# Patient Record
Sex: FEMALE | Race: WHITE | NOT HISPANIC OR LATINO | Employment: FULL TIME | URBAN - METROPOLITAN AREA
[De-identification: names, ages, dates, MRNs, and addresses within clinical notes are randomized per-mention and may not be internally consistent; named-entity substitution may affect disease eponyms.]

---

## 2017-11-09 ENCOUNTER — OFFICE VISIT (OUTPATIENT)
Dept: URGENT CARE | Facility: CLINIC | Age: 62
End: 2017-11-09
Payer: COMMERCIAL

## 2017-11-09 VITALS
OXYGEN SATURATION: 96 % | HEIGHT: 68 IN | DIASTOLIC BLOOD PRESSURE: 79 MMHG | WEIGHT: 186 LBS | BODY MASS INDEX: 28.19 KG/M2 | SYSTOLIC BLOOD PRESSURE: 117 MMHG | TEMPERATURE: 98 F | HEART RATE: 83 BPM

## 2017-11-09 DIAGNOSIS — J01.00 ACUTE MAXILLARY SINUSITIS, RECURRENCE NOT SPECIFIED: Primary | ICD-10-CM

## 2017-11-09 PROCEDURE — 99214 OFFICE O/P EST MOD 30 MIN: CPT | Mod: 25,S$GLB,, | Performed by: FAMILY MEDICINE

## 2017-11-09 PROCEDURE — 96372 THER/PROPH/DIAG INJ SC/IM: CPT | Mod: S$GLB,,, | Performed by: FAMILY MEDICINE

## 2017-11-09 RX ORDER — AMOXICILLIN AND CLAVULANATE POTASSIUM 875; 125 MG/1; MG/1
1 TABLET, FILM COATED ORAL 2 TIMES DAILY
Qty: 20 TABLET | Refills: 0 | Status: SHIPPED | OUTPATIENT
Start: 2017-11-09 | End: 2017-11-19

## 2017-11-09 RX ORDER — FLUOXETINE 10 MG/1
40 TABLET ORAL DAILY
COMMUNITY
End: 2018-06-07

## 2017-11-09 RX ORDER — BETAMETHASONE SODIUM PHOSPHATE AND BETAMETHASONE ACETATE 3; 3 MG/ML; MG/ML
6 INJECTION, SUSPENSION INTRA-ARTICULAR; INTRALESIONAL; INTRAMUSCULAR; SOFT TISSUE
Status: COMPLETED | OUTPATIENT
Start: 2017-11-09 | End: 2017-11-09

## 2017-11-09 RX ADMIN — BETAMETHASONE SODIUM PHOSPHATE AND BETAMETHASONE ACETATE 6 MG: 3; 3 INJECTION, SUSPENSION INTRA-ARTICULAR; INTRALESIONAL; INTRAMUSCULAR; SOFT TISSUE at 03:11

## 2017-11-09 NOTE — PROGRESS NOTES
"Subjective:       Patient ID: Dasia Henning is a 62 y.o. female.    Vitals:  height is 5' 8" (1.727 m) and weight is 84.4 kg (186 lb). Her temperature is 98.1 °F (36.7 °C). Her blood pressure is 117/79 and her pulse is 83. Her oxygen saturation is 96%.     Chief Complaint: Sinus Problem    Patient with sinus congestion x 3 weeks. Patient reports swollen glands and overall fatigue.       Sinus Problem   This is a new problem. The current episode started 1 to 4 weeks ago. The problem is unchanged. There has been no fever. Associated symptoms include congestion, coughing, headaches, a hoarse voice and a sore throat. Pertinent negatives include no chills, ear pain or shortness of breath.     Review of Systems   Constitution: Positive for malaise/fatigue. Negative for chills and fever.   HENT: Positive for congestion, hoarse voice and sore throat. Negative for ear pain.    Eyes: Negative for discharge and redness.   Cardiovascular: Negative for chest pain, dyspnea on exertion and leg swelling.   Respiratory: Positive for cough and sputum production. Negative for shortness of breath and wheezing.    Musculoskeletal: Negative for myalgias.   Gastrointestinal: Negative for abdominal pain and nausea.   Neurological: Positive for headaches.       Objective:      Physical Exam   Constitutional: She appears well-developed and well-nourished.   HENT:   Head: Normocephalic and atraumatic.   Nose: Mucosal edema and rhinorrhea present. Right sinus exhibits maxillary sinus tenderness. Left sinus exhibits maxillary sinus tenderness.   Mouth/Throat: Posterior oropharyngeal erythema present. No oropharyngeal exudate.   Cardiovascular: Normal rate, regular rhythm and normal heart sounds.    Pulmonary/Chest: Effort normal.   Nursing note and vitals reviewed.      Assessment:       1. Acute maxillary sinusitis, recurrence not specified        Plan:         Acute maxillary sinusitis, recurrence not specified  -     amoxicillin-clavulanate " 875-125mg (AUGMENTIN) 875-125 mg per tablet; Take 1 tablet by mouth 2 (two) times daily.  Dispense: 20 tablet; Refill: 0  -     betamethasone acetate-betamethasone sodium phosphate injection 6 mg; Inject 1 mL (6 mg total) into the muscle one time.

## 2017-11-09 NOTE — PATIENT INSTRUCTIONS
Sinusitis (Antibiotic Treatment)    The sinuses are air-filled spaces within the bones of the face. They connect to the inside of the nose. Sinusitis is an inflammation of the tissue lining the sinus cavity. Sinus inflammation can occur during a cold. It can also be due to allergies to pollens and other particles in the air. Sinusitis can cause symptoms of sinus congestion and fullness. A sinus infection causes fever, headache and facial pain. There is often green or yellow drainage from the nose or into the back of the throat (post-nasal drip). You have been given antibiotics to treat this condition.  Home care:  · Take the full course of antibiotics as instructed. Do not stop taking them, even if you feel better.  · Drink plenty of water, hot tea, and other liquids. This may help thin mucus. It also may promote sinus drainage.  · Heat may help soothe painful areas of the face. Use a towel soaked in hot water. Or,  the shower and direct the hot spray onto your face. Using a vaporizer along with a menthol rub at night may also help.   · An expectorant containing guaifenesin may help thin the mucus and promote drainage from the sinuses.  · Over-the-counter decongestants may be used unless a similar medicine was prescribed. Nasal sprays work the fastest. Use one that contains phenylephrine or oxymetazoline. First blow the nose gently. Then use the spray. Do not use these medicines more often than directed on the label or symptoms may get worse. You may also use tablets containing pseudoephedrine. Avoid products that combine ingredients, because side effects may be increased. Read labels. You can also ask the pharmacist for help. (NOTE: Persons with high blood pressure should not use decongestants. They can raise blood pressure.)  · Over-the-counter antihistamines may help if allergies contributed to your sinusitis.    · Do not use nasal rinses or irrigation during an acute sinus infection, unless told to by  your health care provider. Rinsing may spread the infection to other sinuses.  · Use acetaminophen or ibuprofen to control pain, unless another pain medicine was prescribed. (If you have chronic liver or kidney disease or ever had a stomach ulcer, talk with your doctor before using these medicines. Aspirin should never be used in anyone under 18 years of age who is ill with a fever. It may cause severe liver damage.)  · Don't smoke. This can worsen symptoms.  Follow-up care  Follow up with your healthcare provider or our staff if you are not improving within the next week.  When to seek medical advice  Call your healthcare provider if any of these occur:  · Facial pain or headache becoming more severe  · Stiff neck  · Unusual drowsiness or confusion  · Swelling of the forehead or eyelids  · Vision problems, including blurred or double vision  · Fever of 100.4ºF (38ºC) or higher, or as directed by your healthcare provider  · Seizure  · Breathing problems  · Symptoms not resolving within 10 days  Date Last Reviewed: 4/13/2015  © 9765-7101 [employer]. [address] [place] [address], [place] [address]. All rights reserved. This information is not intended as a substitute for professional medical care. Always follow your healthcare professional's instructions.

## 2018-06-07 ENCOUNTER — OFFICE VISIT (OUTPATIENT)
Dept: ORTHOPEDICS | Facility: CLINIC | Age: 63
End: 2018-06-07
Payer: COMMERCIAL

## 2018-06-07 ENCOUNTER — HOSPITAL ENCOUNTER (OUTPATIENT)
Dept: RADIOLOGY | Facility: HOSPITAL | Age: 63
Discharge: HOME | End: 2018-06-07
Attending: ORTHOPAEDIC SURGERY
Payer: COMMERCIAL

## 2018-06-07 VITALS — HEIGHT: 68 IN | WEIGHT: 192 LBS | BODY MASS INDEX: 29.1 KG/M2

## 2018-06-07 DIAGNOSIS — M25.562 LEFT KNEE PAIN, UNSPECIFIED CHRONICITY: ICD-10-CM

## 2018-06-07 DIAGNOSIS — M25.562 LEFT KNEE PAIN, UNSPECIFIED CHRONICITY: Primary | ICD-10-CM

## 2018-06-07 DIAGNOSIS — M17.12 PRIMARY OSTEOARTHRITIS OF LEFT KNEE: ICD-10-CM

## 2018-06-07 PROCEDURE — 3008F BODY MASS INDEX DOCD: CPT | Mod: CPTII,S$GLB,, | Performed by: ORTHOPAEDIC SURGERY

## 2018-06-07 PROCEDURE — 73560 X-RAY EXAM OF KNEE 1 OR 2: CPT | Mod: TC,59,RT

## 2018-06-07 PROCEDURE — 99999 PR PBB SHADOW E&M-EST. PATIENT-LVL II: CPT | Mod: PBBFAC,,, | Performed by: ORTHOPAEDIC SURGERY

## 2018-06-07 PROCEDURE — 73562 X-RAY EXAM OF KNEE 3: CPT | Mod: 26,LT,, | Performed by: RADIOLOGY

## 2018-06-07 PROCEDURE — 99204 OFFICE O/P NEW MOD 45 MIN: CPT | Mod: S$GLB,,, | Performed by: ORTHOPAEDIC SURGERY

## 2018-06-07 PROCEDURE — 73560 X-RAY EXAM OF KNEE 1 OR 2: CPT | Mod: 26,XS,RT, | Performed by: RADIOLOGY

## 2018-06-07 RX ORDER — TRAZODONE HYDROCHLORIDE 50 MG/1
50 TABLET ORAL NIGHTLY
Refills: 1 | COMMUNITY
Start: 2018-05-22

## 2018-06-07 RX ORDER — FLUOXETINE HYDROCHLORIDE 40 MG/1
40 CAPSULE ORAL DAILY
Refills: 1 | COMMUNITY
Start: 2018-05-22 | End: 2020-02-02

## 2018-06-07 RX ORDER — DICLOFENAC SODIUM 75 MG/1
75 TABLET, DELAYED RELEASE ORAL 2 TIMES DAILY
Qty: 60 TABLET | Refills: 3 | Status: SHIPPED | OUTPATIENT
Start: 2018-06-07 | End: 2018-11-03 | Stop reason: SDUPTHER

## 2018-06-07 NOTE — PROGRESS NOTES
"HPI:    Dasia Henning is a 62 y.o. female who is here today for   Chief Complaint   Patient presents with    Left Knee - Pain, Swelling   .  Last worked up 2014.  MRI showed extensive patellofemoral arthritis and only mild weight-bearing arthritis.  The symptoms have progressed to the point where she has difficulty playing tennis and strenuous activity.    Duration: 6 months  Intensity: moderate  Character of pain: sharp  Location: She reports that the pain is predominately  Anterior patella pain worse.    Past Medical History:   Diagnosis Date    Anxiety     Depression     Fracture of lower leg           Current Outpatient Prescriptions:     alprazolam (XANAX) 0.5 MG tablet, , Disp: , Rfl:     FLUoxetine (PROZAC) 40 MG capsule, Take 40 mg by mouth once daily., Disp: , Rfl: 1    traZODone (DESYREL) 50 MG tablet, Take 50 mg by mouth nightly., Disp: , Rfl: 1    diclofenac (VOLTAREN) 75 MG EC tablet, Take 1 tablet (75 mg total) by mouth 2 (two) times daily., Disp: 60 tablet, Rfl: 3     Review of patient's allergies indicates:   Allergen Reactions    Morphine Nausea And Vomiting        ROS  Constitutional: Negative for fever, Positive for weight loss  HENT Negative for congestion  Cardiovascular: Negative chest pain  Respiratory: Negative Shortness of breath  Heme: Negative excessive bleeding  Skin:NegativeItching, Negative breakdown  Musculoskeletal:Negative for back pain, Positive for joint pain, Negative muscle pain, Negative muscle weakness  Neurological: Negative for numbness and paresthesias   Psychiatric/Behavioral: Negative altered mental status, Negative for depression    Physical Exam:   Ht 5' 8" (1.727 m)   Wt 87.1 kg (192 lb 0.3 oz)   BMI 29.20 kg/m²   General appearance: This is a well-developed, Well nourished female No obvious acute distress.  Psychiatric: normal mood and affect;  pleasant and conversant; judgment and thought content normal  Gait is coordinated. Patient has antalgic gait to the " left  Cardiovascular: There are no swelling or varicosities present.   Respiratory: normal respiratory effort   Lymphatic: no adenopathy   Neurologic: alert and oriented to person, place, and time   Deep Tendon Reflexes are normal;  Coordination and Balance: Normal   Musculoskeletal   Neck    ROM shows normal flexion and extension and lateral rotation    Palpation: Non-tender    Stability is normal    Strength is normal    Skin is normal without masses and lesions    Sensation is intact to light touch   Back    ROM showsnormal flexion, extension    and  rotation    Palpation shows no masses    Stability is normal    Strength to flexion and extension well maintained    Core strength is diminished    Skin shows no rashes or cafe au lait spots;     Sensation is intact to light touch    Right Knee  Swelling None  TendernessNone  Range of Motion: 130    Left Knee: Swelling Mild  TendernessPatella  Range of Motion: 120    Radiograph   Osteoarthritis: moderate  Angle: mild varus    Assessment:  Patellofemoral arthritis left knee.    Plan:  We will start nonsteroidal anti-inflammatory Voltaren.  Will modify her activities.

## 2018-11-03 DIAGNOSIS — M25.562 LEFT KNEE PAIN, UNSPECIFIED CHRONICITY: ICD-10-CM

## 2018-11-05 RX ORDER — DICLOFENAC SODIUM 75 MG/1
TABLET, DELAYED RELEASE ORAL
Qty: 60 TABLET | Refills: 3 | Status: SHIPPED | OUTPATIENT
Start: 2018-11-05 | End: 2019-12-06

## 2019-06-18 ENCOUNTER — TELEPHONE (OUTPATIENT)
Dept: ORTHOPEDICS | Facility: CLINIC | Age: 64
End: 2019-06-18

## 2019-06-18 DIAGNOSIS — M50.30 DDD (DEGENERATIVE DISC DISEASE), CERVICAL: Primary | ICD-10-CM

## 2019-11-26 ENCOUNTER — TELEPHONE (OUTPATIENT)
Dept: ORTHOPEDICS | Facility: CLINIC | Age: 64
End: 2019-11-26

## 2019-11-26 DIAGNOSIS — M51.36 DDD (DEGENERATIVE DISC DISEASE), LUMBAR: Primary | ICD-10-CM

## 2019-12-06 ENCOUNTER — OFFICE VISIT (OUTPATIENT)
Dept: ORTHOPEDICS | Facility: CLINIC | Age: 64
End: 2019-12-06
Payer: COMMERCIAL

## 2019-12-06 ENCOUNTER — HOSPITAL ENCOUNTER (OUTPATIENT)
Dept: RADIOLOGY | Facility: HOSPITAL | Age: 64
Discharge: HOME | End: 2019-12-06
Attending: PHYSICIAN ASSISTANT
Payer: COMMERCIAL

## 2019-12-06 VITALS — BODY MASS INDEX: 29.84 KG/M2 | WEIGHT: 196.88 LBS | HEIGHT: 68 IN

## 2019-12-06 DIAGNOSIS — M51.36 DDD (DEGENERATIVE DISC DISEASE), LUMBAR: ICD-10-CM

## 2019-12-06 DIAGNOSIS — M43.16 SPONDYLOLISTHESIS OF LUMBAR REGION: Primary | ICD-10-CM

## 2019-12-06 PROCEDURE — 99214 PR OFFICE/OUTPT VISIT, EST, LEVL IV, 30-39 MIN: ICD-10-PCS | Mod: S$GLB,,, | Performed by: PHYSICIAN ASSISTANT

## 2019-12-06 PROCEDURE — 72100 XR LUMBAR SPINE AP AND LAT WITH FLEX/EXT: ICD-10-PCS | Mod: 26,,, | Performed by: RADIOLOGY

## 2019-12-06 PROCEDURE — 99214 OFFICE O/P EST MOD 30 MIN: CPT | Mod: S$GLB,,, | Performed by: PHYSICIAN ASSISTANT

## 2019-12-06 PROCEDURE — 3008F BODY MASS INDEX DOCD: CPT | Mod: CPTII,S$GLB,, | Performed by: PHYSICIAN ASSISTANT

## 2019-12-06 PROCEDURE — 72100 X-RAY EXAM L-S SPINE 2/3 VWS: CPT | Mod: TC

## 2019-12-06 PROCEDURE — 72120 XR LUMBAR SPINE AP AND LAT WITH FLEX/EXT: ICD-10-PCS | Mod: 26,,, | Performed by: RADIOLOGY

## 2019-12-06 PROCEDURE — 72120 X-RAY BEND ONLY L-S SPINE: CPT | Mod: 26,,, | Performed by: RADIOLOGY

## 2019-12-06 PROCEDURE — 99999 PR PBB SHADOW E&M-EST. PATIENT-LVL II: ICD-10-PCS | Mod: PBBFAC,,, | Performed by: PHYSICIAN ASSISTANT

## 2019-12-06 PROCEDURE — 72100 X-RAY EXAM L-S SPINE 2/3 VWS: CPT | Mod: 26,,, | Performed by: RADIOLOGY

## 2019-12-06 PROCEDURE — 3008F PR BODY MASS INDEX (BMI) DOCUMENTED: ICD-10-PCS | Mod: CPTII,S$GLB,, | Performed by: PHYSICIAN ASSISTANT

## 2019-12-06 PROCEDURE — 99999 PR PBB SHADOW E&M-EST. PATIENT-LVL II: CPT | Mod: PBBFAC,,, | Performed by: PHYSICIAN ASSISTANT

## 2019-12-06 RX ORDER — MELOXICAM 15 MG/1
15 TABLET ORAL DAILY
Qty: 30 TABLET | Refills: 0 | Status: SHIPPED | OUTPATIENT
Start: 2019-12-06 | End: 2020-01-05

## 2019-12-06 NOTE — PROGRESS NOTES
DATE: 2019  PATIENT: Dasia Henning    Supervising Physician: Varun Prescott M.D.    CHIEF COMPLAINT: back pain    HISTORY:  Dasia Henning is a 64 y.o. female who works at [employer] here for initial evaluation of low back pain (Back - 7, Leg - 0). The pain has been present for since May. The patient describes the pain as sharp.  The pain is worse with standing, going up stairs and in the morning and improved by sitting and throughout the day. There is no associated numbness and tingling. There is no subjective weakness. Prior treatments have included ibuprofen and tylenol, but no physical therapy, ESIs or surgery.  She started therapy in the summer but could not continue due to schedule conflicts.     The patient denies myelopathic symptoms such as handwriting changes or difficulty with buttons/coins/keys. Denies perineal paresthesias, bowel/bladder dysfunction.    PAST MEDICAL/SURGICAL HISTORY:  Past Medical History:   Diagnosis Date    Anxiety     Depression     Fracture of lower leg      Past Surgical History:   Procedure Laterality Date    ANKLE FRACTURE SURGERY      APPENDECTOMY       SECTION      knee ascope      left    OVARIAN CYST REMOVAL         Medications:   Current Outpatient Medications on File Prior to Visit   Medication Sig Dispense Refill    alprazolam (XANAX) 0.5 MG tablet       FLUoxetine (PROZAC) 40 MG capsule Take 40 mg by mouth once daily.  1    traZODone (DESYREL) 50 MG tablet Take 50 mg by mouth nightly.  1    [DISCONTINUED] diclofenac (VOLTAREN) 75 MG EC tablet TAKE 1 TABLET BY MOUTH TWICE A DAY (Patient not taking: Reported on 2019) 60 tablet 3     No current facility-administered medications on file prior to visit.        Social History:   Social History     Socioeconomic History    Marital status:      Spouse name: Not on file    Number of children: Not on file    Years of education: Not on file    Highest education level: Not on file   Occupational  "History    Not on file   Social Needs    Financial resource strain: Not on file    Food insecurity:     Worry: Not on file     Inability: Not on file    Transportation needs:     Medical: Not on file     Non-medical: Not on file   Tobacco Use    Smoking status: Former Smoker     Types: Cigarettes    Smokeless tobacco: Never Used   Substance and Sexual Activity    Alcohol use: Yes     Alcohol/week: 5.8 standard drinks     Types: 7 Standard drinks or equivalent per week    Drug use: No    Sexual activity: Not on file   Lifestyle    Physical activity:     Days per week: Not on file     Minutes per session: Not on file    Stress: Not on file   Relationships    Social connections:     Talks on phone: Not on file     Gets together: Not on file     Attends Church service: Not on file     Active member of club or organization: Not on file     Attends meetings of clubs or organizations: Not on file     Relationship status: Not on file   Other Topics Concern    Not on file   Social History Narrative    Not on file       REVIEW OF SYSTEMS:  Constitution: Negative. Negative for chills, fever and night sweats.   Cardiovascular: Negative for chest pain and syncope.   Respiratory: Negative for cough and shortness of breath.   Gastrointestinal: See HPI. Negative for nausea/vomiting. Negative for abdominal pain.  Genitourinary: See HPI. Negative for discoloration or dysuria.  Skin: Negative for dry skin, itching and rash.   Hematologic/Lymphatic: Negative for bleeding problem. Does not bruise/bleed easily.   Musculoskeletal: Negative for falls and muscle weakness.   Neurological: See HPI. No seizures.   Endocrine: Negative for polydipsia, polyphagia and polyuria.   Allergic/Immunologic: Negative for hives and persistent infections.     EXAM:  Ht 5' 8" (1.727 m)   Wt 89.3 kg (196 lb 13.9 oz)   BMI 29.93 kg/m²     General: The patient is a very pleasant 64 y.o. female in no apparent distress, the patient is oriented " to person, place and time.  Psych: Normal mood and affect  HEENT: Vision grossly intact, hearing intact to the spoken word.  Lungs: Respirations unlabored.  Gait: Normal station and gait, no difficulty with toe or heel walk.   Skin: Dorsal lumbar skin negative for rashes, lesions, hairy patches and surgical scars. There is mild lumbar tenderness to palpation.  Range of motion: Lumbar range of motion is acceptable.  Spinal Balance: Global saggital and coronal spinal balance acceptable, not significant for scoliosis and kyphosis.  Musculoskeletal: No pain with the range of motion of the bilateral hips. No trochanteric tenderness to palpation.  Vascular: Bilateral lower extremities warm and well perfused, dorsalis pedis pulses 2+ bilaterally.  Neurological: Normal strength and tone in all major motor groups in the bilateral lower extremities. Normal sensation to light touch in the L2-S1 dermatomes bilaterally.  Deep tendon reflexes symmetric 2+ in the bilateral lower extremities.  Negative Babinski bilaterally. Straight leg raise negative bilaterally.    IMAGING:      Today I personally reviewed AP, Lat and Flex/Ex  upright L-spine films that demonstrate grade I anterolisthesis at L4/5. There is moderate L2/3 disc degeneration.         Body mass index is 29.93 kg/m².    No results found for: HGBA1C        ASSESSMENT/PLAN:    Dasia was seen today for low-back pain.    Diagnoses and all orders for this visit:    Spondylolisthesis of lumbar region    DDD (degenerative disc disease), lumbar    Other orders  -     meloxicam (MOBIC) 15 MG tablet; Take 1 tablet (15 mg total) by mouth once daily.        Today we discussed at length all of the different treatment options including anti-inflammatories, acetaminophen, rest, ice, heat, physical therapy including strengthening and stretching exercises, home exercises, ROM, aerobic conditioning, aqua therapy, other modalities including ultrasound, massage, and dry needling, epidural  steroid injections and finally surgical intervention.      Referral for PT placed today.  Prescription for mobic sent to the pharmacy.  Follow up after therapy if symptoms persist.     Follow up if symptoms worsen or fail to improve.

## 2019-12-16 DIAGNOSIS — M43.16 SPONDYLOLISTHESIS OF LUMBAR REGION: Primary | ICD-10-CM

## 2019-12-16 DIAGNOSIS — M51.36 DDD (DEGENERATIVE DISC DISEASE), LUMBAR: ICD-10-CM

## 2019-12-16 DIAGNOSIS — M50.30 DDD (DEGENERATIVE DISC DISEASE), CERVICAL: ICD-10-CM

## 2020-01-08 ENCOUNTER — CLINICAL SUPPORT (OUTPATIENT)
Dept: REHABILITATION | Facility: OTHER | Age: 65
End: 2020-01-08
Payer: COMMERCIAL

## 2020-01-08 DIAGNOSIS — M54.50 CHRONIC BILATERAL LOW BACK PAIN WITHOUT SCIATICA: ICD-10-CM

## 2020-01-08 DIAGNOSIS — R68.89 DECREASED STRENGTH, ENDURANCE, AND MOBILITY: ICD-10-CM

## 2020-01-08 DIAGNOSIS — Z74.09 DECREASED STRENGTH, ENDURANCE, AND MOBILITY: ICD-10-CM

## 2020-01-08 DIAGNOSIS — R53.1 DECREASED STRENGTH, ENDURANCE, AND MOBILITY: ICD-10-CM

## 2020-01-08 DIAGNOSIS — G89.29 CHRONIC NECK PAIN: ICD-10-CM

## 2020-01-08 DIAGNOSIS — M54.2 CHRONIC NECK PAIN: ICD-10-CM

## 2020-01-08 DIAGNOSIS — G89.29 CHRONIC BILATERAL LOW BACK PAIN WITHOUT SCIATICA: ICD-10-CM

## 2020-01-08 PROCEDURE — 97140 MANUAL THERAPY 1/> REGIONS: CPT | Mod: PN

## 2020-01-08 PROCEDURE — 97161 PT EVAL LOW COMPLEX 20 MIN: CPT | Mod: PN

## 2020-01-08 PROCEDURE — 97110 THERAPEUTIC EXERCISES: CPT | Mod: PN

## 2020-01-08 NOTE — PLAN OF CARE
[facility]  Physical Therapy Initial Evaluation    Name: Dasia Henning  Clinic Number: [id number]    Therapy Diagnosis:   Encounter Diagnoses   Name Primary?    Chronic neck pain     Chronic bilateral low back pain without sciatica     Decreased strength, endurance, and mobility      Physician: Mandie Gray PA-C    Physician Orders: PT Eval and Treat   Medical Diagnosis from Referral:   M43.16 (ICD-10-CM) - Spondylolisthesis of lumbar region   M51.36 (ICD-10-CM) - DDD (degenerative disc disease), lumbar   M50.30 (ICD-10-CM) - DDD (degenerative disc disease), cervical       Evaluation Date: 2020  Authorization Period Expiration: 12/15/2020  Plan of Care Expiration: 2020  Visit # / Visits authorized:     Time In: 9:00 am  Time Out: 10:00 am  Total Billable Time: 60 minutes    Precautions: Standard, Hx L tibial spiral Fx with closed reduction (18 years ago), Hx L knee OA with meniscal tear    Subjective   Date of onset: May 2019  History of current condition - Dasia reports: chronic low back pain, R>L, that started in May 2019 with a fall onto buttocks the month prior. Pt notes point tenderness and pain over R SIJ with increased pain into the lower back and R glute. Pain is intermittent but worse with pivoting on RLE, getting OOB in the AM, and prolonged weight bearing activities. Pt also notes chronic bilateral neck pain, indicates over both upper traps, but says that she believes that it is stress induced.     Medical History:   Past Medical History:   Diagnosis Date    Anxiety     Depression     Fracture of lower leg        Surgical History:   Dasia Henning  has a past surgical history that includes Appendectomy; Ovarian cyst removal; Ankle fracture surgery; knee ascope; and  section.    Medications:   Dasia has a current medication list which includes the following prescription(s): alprazolam, fluoxetine, and trazodone.    Allergies:   Review of patient's  allergies indicates:   Allergen Reactions    Morphine Nausea And Vomiting        Imaging, bone scan films, low back, 2019: Four views: There is a levoconvex curvature of the spine.  There is moderate DJD at L2-L3, mild elsewhere.  There is grade 1 anterolisthesis of L4 on L5.  There is mild DJD at lower thoracic levels.  No instability seen.  No acute fracture dislocation bone destruction seen.    No bone scans done for cervical spine in chart presently.    Prior Therapy: none for c/c  Social History: 2 story home, lives with their spouse  Occupation: works at [employer] - prolonged standing, walking, occasional sitting  Prior Level of Function: independent  Current Level of Function: pain and difficulty with all functional activities    Pain:  Current 4/10, worst 8/10, best 3/10   Location: bilateral low back (indicates R SIJ and across LB along beltlne), bilateral neck (indicates upper traps)    Description: sharp  Aggravating Factors: Standing, Walking, Lifting, Getting out of bed/chair and AM, pivoting, stair climbing  Easing Factors: sitting, movement, tylenol      Pts goals: reduce pain, get back to exercising    Objective     Posture Alignment: mild FHP, rounded/protracted shoulders, increased elevation of iliac crest (R), inferior positioning of ASIS (R), L tibial lateral convex deformity due to Hx of tibial spiral Fx    Palpation: TTP with increased tone beronica UTs, R>L lumbar paraspinals and QL, R glutes med and piriformis. TTP sacral sulcus (R)    DTR's: 2+  Dermatomes: Sensation: Light Touch: Intact    Upper Quarter Screen:    CERVICAL SPINE AROM: WFL all directions  Shoulder ROM: WNL beronica    UPPER EXTREMITY STRENGTH:   Left Right   Shoulder Flexion 5/5 5/5   Shoulder Abduction 5/5 5/5   Shoulder Internal Rotation 5/5 5/5   Shoulder External Rotation 5/5 5/5     Deep Neck Flexor: fair  Scapular strength:  MT = poor  LT = poor  SA = fair  Rhomboids = poor    Flexibility:   Pectoralis Major / Minor: fair  Upper  "Traps: fair  Levator Scap: fair    Joint Mobility: TSP = hypo    Lower Quarter Screen:    Thoracic/Lumbar AROM: Pain/Dysfunction with Movement:   Flexion WNL, tingertips to toes, pain in R SIJ   Extension Mod-min lorenzo, pain over R SIJ and along interspinous space of L5-S1   Right side bending Mod lorenzo, fingertips 2" to knee joint line, c/o pain in R SIJ   Left side bending Mod-min lorenzo, fingertips 1.5" to knee joint line   Right rotation WFL   Left rotation WFL     Hip ROM: mod lorenzo hip IR, ext (R)  Knee ROM: WNL    Lower Extremity Strength  Right LE  Left LE    Hip flexion: 4/5 Hip flexion: 4+/5   Hip extension:  4/5 Hip extension: 4+/5   Hip abduction: 4/5 Hip abduction: 4+/5   Hip adduction:  5/5 Hip adduction:  5/5   Hip Internal rotation   4-/5 Hip Internal rotation 4+/5   Knee Flexion 5/5 Knee Flexion 5/5   Knee Extension 5/5 Knee Extension 5/5   Ankle dorsiflexion: 5/5 Ankle dorsiflexion: 5/5   Ankle plantarflexion: 5/5 Ankle plantarflexion: 5/5     Flexibility:   Mynor test   Hip flexors: fair   Quads: fair  Jesenia test   ITB: NT  Hamstring (SLR): >90 deg beronica    Special Tests:   Test Name  Test Result   Prone Instability Test (--)   Lumbar Quadrant test (--)   Straight Leg Raise (--)   Neural Tension Test (Slump) (--)   Crossed Straight Leg Raise (--)   Walking on toes (--)   Walking on heels  (--)   Clonus (--)   MARCELO (+)   FADIR (+)   SI Joint Provocation Test (compression / distraction) (+)     Functional Movement Analysis:   Gait: I, antalgic  Bed mobility: I, pain with rolling  Transfers: I, valgus collapse noted in RLE>LLE  Balance: SLS <5' with pain in SLS        CMS Impairment/Limitation/Restriction for FOTO lumbar spine Survey    Therapist reviewed FOTO scores for Dasia Henning on 1/8/2020.   FOTO documents entered into [employer] - see Media section.    Limitation Score: 62%  Category: Mobility    Current : CL = least 60% but < 80% impaired, limited or restricted  Goal: CJ = at least 20% but < 40% impaired, " "limited or restricted         TREATMENT   Treatment Time In: 9:30  Treatment Time Out: 10:00  Total Treatment time separate from Evaluation: 30 minutes    Dasia received therapeutic exercises to develop strength, endurance, ROM, flexibility, posture and core stabilization for 20 minutes including:  LTR 10x  Figure 4 piriformis stretch 10x  PPT 10x  Self MET for ant innom rotation 5 x 10" with dowel    Dasia received the following manual therapy techniques: Joint mobilizations were applied to the: R pelvis for 10 minutes, including:  MET to correct anterior R innominate rotation    Home Exercises and Patient Education Provided    Education provided:   - Patient educated regarding pathogenesis, diagnosis, protocol, prognosis, POC, and HEP. Written Home Exercises Provided with written and verbal instructions for frequency and duration of the following exercises: see list above. Pt educated on HEP and activity modifications to reduce c/o pain and improve overall function. Emphasized that the MET for innominate rotation should be performed frequently throughout the day or as needed to maintain pelvic position and reduce c/o pain  - Pt was educated in posture and body mechanics.  Use of a lumbar roll was recommended and demonstrated here today.  Purchase information provided.   - Pt also educated on use of modalities prn to reduce c/o pain and dysfunction.     Written Home Exercises Provided: yes.  Exercises were reviewed and Dasia was able to demonstrate them prior to the end of the session.  Dasia demonstrated good  understanding of the education provided.     See EMR under Media for exercises provided 1/8/2020.    Assessment   Dasia is a 64 y.o. female referred to outpatient Physical Therapy with a medical diagnosis of DDD of lumbar spine and cervical spine. Pt presents with s/s associated with SIJ dysfunction and R anterior innominate rotation. Pt also presents with trunk and LE weakness, decreased ROM, flexibility, " and postural awareness and endurance that continues to facilitate c/o pain. Impairments limit pt with all functional activities.    Pt prognosis is Good.   Pt will benefit from skilled outpatient Physical Therapy to address the deficits stated above and in the chart below, provide pt/family education, and to maximize pt's level of independence.     Plan of care discussed with patient: Yes  Pt's spiritual, cultural and educational needs considered and patient is agreeable to the plan of care and goals as stated below:     Anticipated Barriers for therapy: standard, occupation    Medical Necessity is demonstrated by the following  History  Co-morbidities and personal factors that may impact the plan of care Co-morbidities:   difficulty sleeping, financial considerations and level of undertstanding of current condition    Personal Factors:   education level  coping style  social background  lifestyle     low   Examination  Body Structures and Functions, activity limitations and participation restrictions that may impact the plan of care Body Regions:   neck  back  lower extremities  upper extremities  trunk    Body Systems:    gross symmetry  ROM  strength  gross coordinated movement  gait  transfers  transitions  motor control  motor learning  flexibility, joint mobility, postural awareness and endurance    Participation Restrictions:   ADLs, HHCs, self care, recreational and work related activities    Activity limitations:   Learning and applying knowledge  no deficits    General Tasks and Commands  no deficits    Communication  no deficits    Mobility  lifting and carrying objects  walking  driving (bike, car, motorcycle)    Self care  looking after one's health    Domestic Life  shopping  cooking  doing house work (cleaning house, washing dishes, laundry)    Interactions/Relationships  no deficits    Life Areas  employment    Community and Social Life  community life  recreation and leisure  [employer] and  spirituality         moderate   Clinical Presentation stable and uncomplicated low   Decision Making/ Complexity Score: low     Goals:  Short Term Goals: 6 weeks   1. Pt to improve scapular strength by a half grade to assist with dynamic cervico-thoracic alignment and stability.  2. Pt to improve pelvic alignment to be able to perform bed mobility without pain or difficulty.  3. Pt to improve trunk and LE strength by a half grade to allow for increased ease with stair climbing.  4. Pt to improve functional mobility with FOTO limitation <=50% disability.    Long Term Goals: 12 weeks   1. Pt to improve scapular strength to >=4+/5 to assist with dynamic cervico-thoracic alignment and stability.  2. Pt independent with HEP and demo's good return technique.  3. Pt to improve trunk and LE strength to 5/5 to allow for increased ease with stair climbing.  4. Pt to improve functional mobility with FOTO limitation <=40% disability.    Plan   Plan of care Certification: 1/8/2020 to 04/08/2020.    Outpatient Physical Therapy 2 times weekly for 12 weeks to include the following interventions: Aquatic Therapy, Cervical/Lumbar Traction, Electrical Stimulation prn, Gait Training, Iontophoresis (with dexamethasone prn), Manual Therapy, Moist Heat/ Ice, Neuromuscular Re-ed, Patient Education, Self Care, Therapeutic Activites, Therapeutic Exercise and IASTYM, therapeutic taping, dry needling, cupping prn. Progress HEP towards D/C. Recommend F/U with MD if symptoms worsen or do not resolve. Patient may be seen by a PTA for treatment to carry out their plan of care.  Face-to-face conferences will be held.       Marjorie Martines, PT

## 2020-01-15 ENCOUNTER — CLINICAL SUPPORT (OUTPATIENT)
Dept: REHABILITATION | Facility: OTHER | Age: 65
End: 2020-01-15
Payer: COMMERCIAL

## 2020-01-15 DIAGNOSIS — R68.89 DECREASED STRENGTH, ENDURANCE, AND MOBILITY: ICD-10-CM

## 2020-01-15 DIAGNOSIS — M54.50 CHRONIC BILATERAL LOW BACK PAIN WITHOUT SCIATICA: ICD-10-CM

## 2020-01-15 DIAGNOSIS — G89.29 CHRONIC NECK PAIN: ICD-10-CM

## 2020-01-15 DIAGNOSIS — Z74.09 DECREASED STRENGTH, ENDURANCE, AND MOBILITY: ICD-10-CM

## 2020-01-15 DIAGNOSIS — G89.29 CHRONIC BILATERAL LOW BACK PAIN WITHOUT SCIATICA: ICD-10-CM

## 2020-01-15 DIAGNOSIS — M54.2 CHRONIC NECK PAIN: ICD-10-CM

## 2020-01-15 DIAGNOSIS — R53.1 DECREASED STRENGTH, ENDURANCE, AND MOBILITY: ICD-10-CM

## 2020-01-15 PROCEDURE — 97140 MANUAL THERAPY 1/> REGIONS: CPT | Mod: PN

## 2020-01-15 PROCEDURE — 97110 THERAPEUTIC EXERCISES: CPT | Mod: PN

## 2020-01-15 NOTE — PROGRESS NOTES
"  Physical Therapy Daily Treatment Note     Name: Dasia Henning  Clinic Number: [id number]    Therapy Diagnosis:   Encounter Diagnoses   Name Primary?    Chronic neck pain     Chronic bilateral low back pain without sciatica     Decreased strength, endurance, and mobility      Physician: Mandie Gray PA-C    Visit Date: 1/15/2020    Physician Orders: PT Eval and Treat   Medical Diagnosis from Referral:   M43.16 (ICD-10-CM) - Spondylolisthesis of lumbar region   M51.36 (ICD-10-CM) - DDD (degenerative disc disease), lumbar   M50.30 (ICD-10-CM) - DDD (degenerative disc disease), cervical         Evaluation Date: 1/8/2020  Authorization Period Expiration: 12/15/2020  Plan of Care Expiration: 04/08/2020  Visit # / Visits authorized: 2/ 1     Time In: 9:00 am  Time Out: 10:00 am  Total Billable Time: 60 minutes     Precautions: Standard, Hx L tibial spiral Fx with closed reduction (18 years ago), Hx L knee OA with meniscal tear    Subjective     Pt reports: initial increase in soreness after initial visit but says that doing her exercises help reduce her pain. C/c today is sharp pain over the R sacral sulcus.  She was compliant with home exercise program.  Response to previous treatment: good  Functional change: none    Pain: 5/10  Location: right low back (indicates R SIJ and across LB along beltlne), bilateral neck (indicates upper traps)       Objective     Dasia received therapeutic exercises to develop strength, endurance, ROM, flexibility, posture and core stabilization for 50 minutes including:    Self MET for L sacral rotation 3 min - defer next visit  Self MET for R anterior innominate rotation w/ dowel 6 x 10"  PPT 20x  LTR 3 x 30"  Figure 4 piriformis stretch 3 x 30"    PPT with SLR: 2 x 10 - defer next visit with progression of core activation  Resisted bridge 1 x 15 x OTB with simultaneous glute set  Prone glute ets 10 x 10"  Butt winks (prone) x 3 min    Add next visit: 1/2 foam pec stretch, 1/2 foam " OH flex, SA punches, Prone scap retractions, Prone Is, Ts, Ys      Dasia received the following manual therapy techniques: 10 min x preparation and application of kinesiotape w/ star technique over R sacral sulcus for SIJ stability and pain reduction. Patient received education regarding appropriate care and removal of Kinesiotape. Patient instructed in proper removal techniques if skin irritation occurs.     Home Exercises Provided and Patient Education Provided     Education provided:   - none    Written Home Exercises Provided: Patient instructed to cont prior HEP.  Exercises were reviewed and Dasia was able to demonstrate them prior to the end of the session.  Dasia demonstrated good  understanding of the education provided.     See EMR under Patient Instructions for exercises provided prior visit.    Assessment     Pt presents with left sacral rotation with marked R anterior innominate rotation with pointed pain over R SIJ. Improved pelvic positioning with reduced c/o pain following self MET. Attempted to progress glute and core strengthening but UA due to increased pain. Good tolerance with glute activation but difficulty performing unilat glute sets with decreased motor coordination.    Dasia is progressing well towards her goals.   Pt prognosis is Good.     Pt will continue to benefit from skilled outpatient physical therapy to address the deficits listed in the problem list box on initial evaluation, provide pt/family education and to maximize pt's level of independence in the home and community environment.     Pt's spiritual, cultural and educational needs considered and pt agreeable to plan of care and goals.     Anticipated barriers to physical therapy: standard, occupation    Goals:   Short Term Goals: 6 weeks   1. Pt to improve scapular strength by a half grade to assist with dynamic cervico-thoracic alignment and stability. (not met, progressing)  2. Pt to improve pelvic alignment to be able to  perform bed mobility without pain or difficulty. (not met, progressing)  3. Pt to improve trunk and LE strength by a half grade to allow for increased ease with stair climbing. (not met, progressing)  4. Pt to improve functional mobility with FOTO limitation <=50% disability. (not met, progressing)     Long Term Goals: 12 weeks   1. Pt to improve scapular strength to >=4+/5 to assist with dynamic cervico-thoracic alignment and stability. (not met, progressing)  2. Pt independent with HEP and demo's good return technique. (not met, progressing)  3. Pt to improve trunk and LE strength to 5/5 to allow for increased ease with stair climbing. (not met, progressing)  4. Pt to improve functional mobility with FOTO limitation <=40% disability. (not met, progressing)    Plan     Continue with POC for global strength and stability.  Plan of care Certification: 1/8/2020 to 04/08/2020.--- PN due by 02/08/2020    Marjorie Martines, PT

## 2020-01-22 ENCOUNTER — CLINICAL SUPPORT (OUTPATIENT)
Dept: REHABILITATION | Facility: OTHER | Age: 65
End: 2020-01-22
Payer: COMMERCIAL

## 2020-01-22 DIAGNOSIS — M54.50 CHRONIC BILATERAL LOW BACK PAIN WITHOUT SCIATICA: ICD-10-CM

## 2020-01-22 DIAGNOSIS — Z74.09 DECREASED STRENGTH, ENDURANCE, AND MOBILITY: ICD-10-CM

## 2020-01-22 DIAGNOSIS — M54.2 CHRONIC NECK PAIN: ICD-10-CM

## 2020-01-22 DIAGNOSIS — R68.89 DECREASED STRENGTH, ENDURANCE, AND MOBILITY: ICD-10-CM

## 2020-01-22 DIAGNOSIS — G89.29 CHRONIC BILATERAL LOW BACK PAIN WITHOUT SCIATICA: ICD-10-CM

## 2020-01-22 DIAGNOSIS — R53.1 DECREASED STRENGTH, ENDURANCE, AND MOBILITY: ICD-10-CM

## 2020-01-22 DIAGNOSIS — G89.29 CHRONIC NECK PAIN: ICD-10-CM

## 2020-01-22 PROCEDURE — 97110 THERAPEUTIC EXERCISES: CPT | Mod: PN

## 2020-01-22 NOTE — PROGRESS NOTES
"  Physical Therapy Daily Treatment Note     Name: Dasia Henning  Clinic Number: [id number]    Therapy Diagnosis:   Encounter Diagnoses   Name Primary?    Chronic neck pain     Chronic bilateral low back pain without sciatica     Decreased strength, endurance, and mobility      Physician: Mandie Gray PA-C    Visit Date: 1/22/2020    Physician Orders: PT Eval and Treat   Medical Diagnosis from Referral:   M43.16 (ICD-10-CM) - Spondylolisthesis of lumbar region   M51.36 (ICD-10-CM) - DDD (degenerative disc disease), lumbar   M50.30 (ICD-10-CM) - DDD (degenerative disc disease), cervical         Evaluation Date: 1/8/2020  Authorization Period Expiration: 12/15/2020  Plan of Care Expiration: 04/08/2020  Visit # / Visits authorized: 3/ 1     Time In:  8:00 am  Time Out: 8:45 am  Total Billable Time: 45 minutes     Precautions: Standard, Hx L tibial spiral Fx with closed reduction (18 years ago), Hx L knee OA with meniscal tear    Subjective     Pt reports: feeling better with the tape, but was traveling recently which increased her pain again.  She was compliant with home exercise program.  Response to previous treatment: good  Functional change: none    Pain: 5/10  Location: right low back (indicates R SIJ and across LB along beltlne), bilateral neck (indicates upper traps)       Objective     Dasia received therapeutic exercises to develop strength, endurance, ROM, flexibility, posture and core stabilization for 30 minutes including:    Self MET for L sacral rotation 3 min - defer today  Self MET for R anterior innominate rotation w/ dowel 6 x 10"  PPT 20x  LTR 3 x 30"  Figure 4 piriformis stretch 3 x 30"    PPT with SLR: 2 x 10 - defer next visit with progression of core activation  Resisted bridge 2 x 15 x OTB with simultaneous glute set  Prone glute ets 10 x 10"  Butt winks (prone) x 3 min  +supine hl hip abd 3 x 10 x OTB  +supine BKFO 3 x 10 x OTB    Add next visit: 1/2 foam pec stretch, 1/2 foam OH flex, SA " punches, Prone scap retractions, Prone Is, Ts, Ys      Dasia received the following manual therapy techniques:   5 min x MET for R anterior innominate rotation  10 min x preparation and application of kinesiotape w/ star technique over R sacral sulcus for SIJ stability and pain reduction. Patient received education regarding appropriate care and removal of Kinesiotape. Patient instructed in proper removal techniques if skin irritation occurs.     Home Exercises Provided and Patient Education Provided     Education provided:   - none    Written Home Exercises Provided: Patient instructed to cont prior HEP.  Exercises were reviewed and Dasia was able to demonstrate them prior to the end of the session.  Dasia demonstrated good  understanding of the education provided.     See EMR under Patient Instructions for exercises provided prior visit.    Assessment     Presents with left sacral rotation with marked R anterior innominate rotation with pointed pain over R SIJ. Improved pelvic positioning with reduced c/o pain following self MET. Slow progression of glute strengthening performed today with bridges, clams, and BKFO. Good tolerance with improved core activation and Min VC/TC.    Dasia is progressing well towards her goals.   Pt prognosis is Good.     Pt will continue to benefit from skilled outpatient physical therapy to address the deficits listed in the problem list box on initial evaluation, provide pt/family education and to maximize pt's level of independence in the home and community environment.     Pt's spiritual, cultural and educational needs considered and pt agreeable to plan of care and goals.     Anticipated barriers to physical therapy: standard, occupation    Goals:   Short Term Goals: 6 weeks   1. Pt to improve scapular strength by a half grade to assist with dynamic cervico-thoracic alignment and stability. (not met, progressing)  2. Pt to improve pelvic alignment to be able to perform bed mobility  without pain or difficulty. (not met, progressing)  3. Pt to improve trunk and LE strength by a half grade to allow for increased ease with stair climbing. (not met, progressing)  4. Pt to improve functional mobility with FOTO limitation <=50% disability. (not met, progressing)     Long Term Goals: 12 weeks   1. Pt to improve scapular strength to >=4+/5 to assist with dynamic cervico-thoracic alignment and stability. (not met, progressing)  2. Pt independent with HEP and demo's good return technique. (not met, progressing)  3. Pt to improve trunk and LE strength to 5/5 to allow for increased ease with stair climbing. (not met, progressing)  4. Pt to improve functional mobility with FOTO limitation <=40% disability. (not met, progressing)    Plan     Continue with POC for global strength and stability.  Plan of care Certification: 1/8/2020 to 04/08/2020.--- PN due by 02/08/2020    Marjorie Martines, PT

## 2020-01-24 ENCOUNTER — CLINICAL SUPPORT (OUTPATIENT)
Dept: REHABILITATION | Facility: OTHER | Age: 65
End: 2020-01-24
Payer: COMMERCIAL

## 2020-01-24 DIAGNOSIS — Z74.09 DECREASED STRENGTH, ENDURANCE, AND MOBILITY: ICD-10-CM

## 2020-01-24 DIAGNOSIS — G89.29 CHRONIC NECK PAIN: ICD-10-CM

## 2020-01-24 DIAGNOSIS — R53.1 DECREASED STRENGTH, ENDURANCE, AND MOBILITY: ICD-10-CM

## 2020-01-24 DIAGNOSIS — R68.89 DECREASED STRENGTH, ENDURANCE, AND MOBILITY: ICD-10-CM

## 2020-01-24 DIAGNOSIS — M54.50 CHRONIC BILATERAL LOW BACK PAIN WITHOUT SCIATICA: ICD-10-CM

## 2020-01-24 DIAGNOSIS — M54.2 CHRONIC NECK PAIN: ICD-10-CM

## 2020-01-24 DIAGNOSIS — G89.29 CHRONIC BILATERAL LOW BACK PAIN WITHOUT SCIATICA: ICD-10-CM

## 2020-01-24 PROCEDURE — 97110 THERAPEUTIC EXERCISES: CPT | Mod: PN

## 2020-01-24 NOTE — PROGRESS NOTES
"  Physical Therapy Daily Treatment Note     Name: Dasia Henning  Clinic Number: [id number]    Therapy Diagnosis:   Encounter Diagnoses   Name Primary?    Chronic neck pain     Chronic bilateral low back pain without sciatica     Decreased strength, endurance, and mobility      Physician: Mandie Gray PA-C    Visit Date: 1/24/2020    Physician Orders: PT Eval and Treat   Medical Diagnosis from Referral:   M43.16 (ICD-10-CM) - Spondylolisthesis of lumbar region   M51.36 (ICD-10-CM) - DDD (degenerative disc disease), lumbar   M50.30 (ICD-10-CM) - DDD (degenerative disc disease), cervical         Evaluation Date: 1/8/2020  Authorization Period Expiration: 12/15/2020  Plan of Care Expiration: 04/08/2020  Visit # / Visits authorized: 3/ 1     Time In:  8:00 am  Time Out: 8:45 am  Total Billable Time: 45 minutes     Precautions: Standard, Hx L tibial spiral Fx with closed reduction (18 years ago), Hx L knee OA with meniscal tear    Subjective     Pt reports: feeling better with the tape. Continues to have R SIJ pain into the low back with getting in/out of a car and rolling and getting in/out of bed.    She was compliant with home exercise program.  Response to previous treatment: good  Functional change: none    Pain: 5/10  Location: right low back (indicates R SIJ and across LB along beltlne), bilateral neck (indicates upper traps)       Objective     Dasia received therapeutic exercises to develop strength, endurance, ROM, flexibility, posture and core stabilization for 55 minutes including:    Self MET for R anterior innominate rotation w/ dowel 6 x 10"  PPT 20x  +PPT with marches 30x  +PPT with Heel slides 30x  supine hl hip abd 3 x 10 x OTB  supine BKFO 3 x 10 x OTB    LTR 3 x 30" - NP  Figure 4 piriformis stretch 3 x 30" - NP    Resisted bridge 2 x 15 x OTB with simultaneous glute set - attempted but UA 2* sharp pain  Prone glute ets 10 x 10"  Butt winks (prone) x 10 x 10"  +prone hip ext 1 x 15 w/ glute " set  +ptone donkey kicks 1 x 15 w/ glute set    Add next visit: 1/2 foam pec stretch, 1/2 foam OH flex, SA punches, Prone scap retractions, Prone Is, Ts, Ys      Dasia received the following manual therapy techniques:   5 min x MET for R anterior innominate rotation  0 min x preparation and application of kinesiotape w/ star technique over R sacral sulcus for SIJ stability and pain reduction. Patient received education regarding appropriate care and removal of Kinesiotape. Patient instructed in proper removal techniques if skin irritation occurs.     Home Exercises Provided and Patient Education Provided     Education provided:   - none    Written Home Exercises Provided: Patient instructed to cont prior HEP.  Exercises were reviewed and Dasia was able to demonstrate them prior to the end of the session.  Dasia demonstrated good  understanding of the education provided.     See EMR under Patient Instructions for exercises provided prior visit.    Assessment     Presents with left sacral rotation with marked R anterior innominate rotation with pointed pain over R SIJ. Improved pelvic positioning with reduced c/o pain following self MET. Anterior innominate rotation returned, indicating joint is more unstable than previously believed. Heavy edu to use self MET with dowel at home to realign joint. New exercises added to promote core activation and glute strength necessary for improving SIJ stability. Good tolerance without increased symptoms.      Dasia is progressing well towards her goals.   Pt prognosis is Good.     Pt will continue to benefit from skilled outpatient physical therapy to address the deficits listed in the problem list box on initial evaluation, provide pt/family education and to maximize pt's level of independence in the home and community environment.     Pt's spiritual, cultural and educational needs considered and pt agreeable to plan of care and goals.     Anticipated barriers to physical  therapy: standard, occupation    Goals:   Short Term Goals: 6 weeks   1. Pt to improve scapular strength by a half grade to assist with dynamic cervico-thoracic alignment and stability. (not met, progressing)  2. Pt to improve pelvic alignment to be able to perform bed mobility without pain or difficulty. (not met, progressing)  3. Pt to improve trunk and LE strength by a half grade to allow for increased ease with stair climbing. (not met, progressing)  4. Pt to improve functional mobility with FOTO limitation <=50% disability. (not met, progressing)     Long Term Goals: 12 weeks   1. Pt to improve scapular strength to >=4+/5 to assist with dynamic cervico-thoracic alignment and stability. (not met, progressing)  2. Pt independent with HEP and demo's good return technique. (not met, progressing)  3. Pt to improve trunk and LE strength to 5/5 to allow for increased ease with stair climbing. (not met, progressing)  4. Pt to improve functional mobility with FOTO limitation <=40% disability. (not met, progressing)    Plan     Continue with POC for global strength and stability.  Plan of care Certification: 1/8/2020 to 04/08/2020.--- PN due by 02/08/2020    Marjorie Martines, PT

## 2020-01-29 ENCOUNTER — CLINICAL SUPPORT (OUTPATIENT)
Dept: REHABILITATION | Facility: OTHER | Age: 65
End: 2020-01-29
Payer: COMMERCIAL

## 2020-01-29 DIAGNOSIS — M54.50 CHRONIC BILATERAL LOW BACK PAIN WITHOUT SCIATICA: ICD-10-CM

## 2020-01-29 DIAGNOSIS — G89.29 CHRONIC BILATERAL LOW BACK PAIN WITHOUT SCIATICA: ICD-10-CM

## 2020-01-29 DIAGNOSIS — R53.1 DECREASED STRENGTH, ENDURANCE, AND MOBILITY: ICD-10-CM

## 2020-01-29 DIAGNOSIS — G89.29 CHRONIC NECK PAIN: ICD-10-CM

## 2020-01-29 DIAGNOSIS — M54.2 CHRONIC NECK PAIN: ICD-10-CM

## 2020-01-29 DIAGNOSIS — Z74.09 DECREASED STRENGTH, ENDURANCE, AND MOBILITY: ICD-10-CM

## 2020-01-29 DIAGNOSIS — R68.89 DECREASED STRENGTH, ENDURANCE, AND MOBILITY: ICD-10-CM

## 2020-01-29 PROCEDURE — 97110 THERAPEUTIC EXERCISES: CPT | Mod: PN

## 2020-01-29 NOTE — PROGRESS NOTES
Dry Needling Daily Note     Patient ID: Dasia Henning is a 64 y.o. female.  Diagnosis:   1. Chronic neck pain     2. Chronic bilateral low back pain without sciatica     3. Decreased strength, endurance, and mobility       Date:  01/29/2020    Start Time:  1:30  Stop Time:  1:50    Subjective:     Pt reports: continued R low back and pelvic pain  Pain Scale: Dasia rates pain on a scale of 0-10 to be 3 currently.    Objective:     Pt signed written consent to dry needling Rx.  Pt gave verbal consent for DN.   Pt rec'd dry needling to R low back, hip with 3 in needles with no adverse effects.    Homeostatic points:  1. Deep Radial  2. Greater Auricular  3. Spinal Accessory  4. Saphenous  5. Deep Fibular  6. Tibial  7. Greater Occipital  8. Suprascapular ( infraspinatus)  9. Lateral Antebrachial Cutaneous  10. Sural  11. Lateral Popliteal  12. Superficial Radial  13. Dorsal Scapular  14. Superior Cluneal  15. Posterior Cutaneous L 2  16. Inferior Gluteal  17. Lateral Pectoral  18. Ilitotibal  19. Infraorbital  20. Spinous process T7  21. Posterior cutaneous  T6  22. Posterior cutaneous L 5  23. Supraorbital  24. Common fibular    Paravertebral Points:  L3-5    Symptomatic Points:   R QL    Assessment:     Patient demonstrated appropriate response to FDN. Initially presents with reduced MF mobility and increased tone of R lumbar paraspinals. Winding technique used every 5 minutes. Twitch response to lower lumbar paraspinals and piriformis. Marked improvement with global trunk AROM following manual therapy.     Patient Education/Response:     Education provided re:   Purpose, benefits, and potential side effects of dry needling.   Educated pt to use heat following treatment sessions to reduce c/o pain or soreness and to improve circulation to needled sites.   Encouraged pt to continue with HEP to maintain flexibility, ROM, and functional mobility.  Dasia verbalized good understanding of education     Plans and Goals:      Monitor response to FDN. Continue with FDN in POC as tolerated.     Marjorie Martines, PT  1/29/2020

## 2020-01-29 NOTE — PROGRESS NOTES
"  Physical Therapy Daily Treatment Note     Name: Dasia Henning  Clinic Number: [id number]    Therapy Diagnosis:   Encounter Diagnoses   Name Primary?    Chronic neck pain     Chronic bilateral low back pain without sciatica     Decreased strength, endurance, and mobility      Physician: Mandie Gray PA-C    Visit Date: 1/29/2020    Physician Orders: PT Eval and Treat   Medical Diagnosis from Referral:   M43.16 (ICD-10-CM) - Spondylolisthesis of lumbar region   M51.36 (ICD-10-CM) - DDD (degenerative disc disease), lumbar   M50.30 (ICD-10-CM) - DDD (degenerative disc disease), cervical         Evaluation Date: 1/8/2020  Authorization Period Expiration: 12/15/2020  Plan of Care Expiration: 04/08/2020  Visit # / Visits authorized: 4/ 19 + 1/1 (eval)     Time In:  1:00 am  Time Out: 2:00 am  Total Billable Time: 45 minutes     Precautions: Standard, Hx L tibial spiral Fx with closed reduction (18 years ago), Hx L knee OA with meniscal tear    Subjective     Pt reports: stiffness while OOT on a business trip. Felt better after massage but is feeing stiff again today.  She was compliant with home exercise program.  Response to previous treatment: good  Functional change: none    Pain: 5/10  Location: right low back (indicates R SIJ and across LB along beltlne), bilateral neck (indicates upper traps)       Objective     Dasia received therapeutic exercises to develop strength, endurance, ROM, flexibility, posture and core stabilization for 40 minutes including:    Self MET for R anterior innominate rotation w/ dowel 6 x 10"  PPT 20x  PPT with marches 30x  PPT with Heel slides 30x  supine hl hip abd 3 x 10 x OTB  supine BKFO 3 x 10 x OTB    LTR 3 x 30" - NP  Figure 4 piriformis stretch 3 x 30" - NP    Resisted bridge 2 x 15 x OTB with simultaneous glute set - attempted but UA 2* sharp pain  Prone glute ets 10 x 10"  Butt winks (prone) x 10 x 10"  prone hip ext 1 x 15 w/ glute set  ptone donkey kicks 1 x 15 w/ glute " set    Add next visit: 1/2 foam pec stretch, 1/2 foam OH flex, SA punches, Prone scap retractions, Prone Is, Ts, Ys      Dasia received the following manual therapy techniques:   5 min x MET for R anterior innominate rotation  0 min x preparation and application of kinesiotape w/ star technique over R sacral sulcus for SIJ stability and pain reduction. Patient received education regarding appropriate care and removal of Kinesiotape. Patient instructed in proper removal techniques if skin irritation occurs.   15 min x dry needling - see note by Marjorie Price, PT    Home Exercises Provided and Patient Education Provided     Education provided:   - none    Written Home Exercises Provided: Patient instructed to cont prior HEP.  Exercises were reviewed and Dasia was able to demonstrate them prior to the end of the session.  Dasia demonstrated good  understanding of the education provided.     See EMR under Patient Instructions for exercises provided prior visit.    Assessment     Reduced R anterior innominate rotation with improved positioning following self MET. Good glute activation bilaterally with fair activation unilaterally. Difficulty activating glutes during supine bridge despite VC for glute set prior to lift off. Plan to progress strength and stability of trunk and pelvis next visit. Initiated dry needling today with good tolerance and improved trunk mobility after manual therapy.    Dasia is progressing well towards her goals.   Pt prognosis is Good.     Pt will continue to benefit from skilled outpatient physical therapy to address the deficits listed in the problem list box on initial evaluation, provide pt/family education and to maximize pt's level of independence in the home and community environment.     Pt's spiritual, cultural and educational needs considered and pt agreeable to plan of care and goals.     Anticipated barriers to physical therapy: standard, occupation    Goals:   Short Term Goals: 6  weeks   1. Pt to improve scapular strength by a half grade to assist with dynamic cervico-thoracic alignment and stability. (not met, progressing)  2. Pt to improve pelvic alignment to be able to perform bed mobility without pain or difficulty. (not met, progressing)  3. Pt to improve trunk and LE strength by a half grade to allow for increased ease with stair climbing. (not met, progressing)  4. Pt to improve functional mobility with FOTO limitation <=50% disability. (not met, progressing)     Long Term Goals: 12 weeks   1. Pt to improve scapular strength to >=4+/5 to assist with dynamic cervico-thoracic alignment and stability. (not met, progressing)  2. Pt independent with HEP and demo's good return technique. (not met, progressing)  3. Pt to improve trunk and LE strength to 5/5 to allow for increased ease with stair climbing. (not met, progressing)  4. Pt to improve functional mobility with FOTO limitation <=40% disability. (not met, progressing)    Plan     Continue with POC for global strength and stability.  Plan of care Certification: 1/8/2020 to 04/08/2020.--- PN due by 02/08/2020    Marjorie Martines, PT

## 2020-01-29 NOTE — PROGRESS NOTES
"  Physical Therapy Daily Treatment Note     Name: Dasia Henning  Clinic Number: [id number]    Therapy Diagnosis:   Encounter Diagnoses   Name Primary?    Chronic neck pain     Chronic bilateral low back pain without sciatica     Decreased strength, endurance, and mobility      Physician: Mandie Gray PA-C    Visit Date: 1/30/2020    Physician Orders: PT Eval and Treat   Medical Diagnosis from Referral:   M43.16 (ICD-10-CM) - Spondylolisthesis of lumbar region   M51.36 (ICD-10-CM) - DDD (degenerative disc disease), lumbar   M50.30 (ICD-10-CM) - DDD (degenerative disc disease), cervical         Evaluation Date: 1/8/2020  Authorization Period Expiration: 12/15/2020  Plan of Care Expiration: 04/08/2020  Visit # / Visits authorized: 4/ 19 + 1/1 (eval)     Time In:  9:00 am  Time Out: 10:00 am  Total Billable Time: 50 minutes     Precautions: Standard, Hx L tibial spiral Fx with closed reduction (18 years ago), Hx L knee OA with meniscal tear    Subjective     Pt reports: increased soreness in her low back and R glute after the dry needling yesterday. "It was painful and really hard to get out of bed this morning"    She was compliant with home exercise program.  Response to previous treatment: good  Functional change: none    Pain: 5/10  Location: right low back (indicates R SIJ and across LB along beltlne), bilateral neck (indicates upper traps)       Objective     Dasia received therapeutic exercises to develop strength, endurance, ROM, flexibility, posture and core stabilization for 45 minutes including:    Self MET for R anterior innominate rotation w/ dowel 6 x 10"  PPT 20x  PPT with marches 30x  PPT with Heel slides 30x  Figure 4 piriformis stretch 3 x 30" - VC for technique  supine hl hip abd 2 x 15 x GTB  supine BKFO 2 x 15 x GTB  +HL hip add 2 min x 5" holds  +SL hip abd: 2 x 15  Prone glute ets 10 x 10"  Butt winks (prone) x 10 x 10"  prone hip ext 2 x 15 w/ glute set  ptone donkey kicks 2 x 15 w/ glute " set      +rows: 2 x 10 x GTB  +SALPD: 2 x 10 x GTB  +anti rotations: 2 x 10 x YTB  +side stepping: 2 x 40 ft x OTB at knees      Dasia received the following manual therapy techniques:   00 min x MET for R anterior innominate rotation  5 min x preparation and application of kinesiotape w/ star technique over R sacral sulcus for SIJ stability and pain reduction. Patient received education regarding appropriate care and removal of Kinesiotape. Patient instructed in proper removal techniques if skin irritation occurs.   00 min x dry needling - see note by Marjorie Price, PT    Modalities: 10 min x MHP to low back at beginning of session    Home Exercises Provided and Patient Education Provided     Education provided:   - none    Written Home Exercises Provided: Patient instructed to cont prior HEP.  Exercises were reviewed and Dasia was able to demonstrate them prior to the end of the session.  Dasia demonstrated good  understanding of the education provided.     See EMR under Patient Instructions for exercises provided prior visit.    Assessment     Began session with modalities and piriformis stretching due to complaints of soreness this morning. New exercises added to focus on trunk and pelvic strength and stability with simultaneous core activation to limit trunk rotation.     Dasia is progressing well towards her goals.   Pt prognosis is Good.     Pt will continue to benefit from skilled outpatient physical therapy to address the deficits listed in the problem list box on initial evaluation, provide pt/family education and to maximize pt's level of independence in the home and community environment.     Pt's spiritual, cultural and educational needs considered and pt agreeable to plan of care and goals.     Anticipated barriers to physical therapy: standard, occupation    Goals:   Short Term Goals: 6 weeks   1. Pt to improve scapular strength by a half grade to assist with dynamic cervico-thoracic alignment and  stability. (not met, progressing)  2. Pt to improve pelvic alignment to be able to perform bed mobility without pain or difficulty. (not met, progressing)  3. Pt to improve trunk and LE strength by a half grade to allow for increased ease with stair climbing. (not met, progressing)  4. Pt to improve functional mobility with FOTO limitation <=50% disability. (not met, progressing)     Long Term Goals: 12 weeks   1. Pt to improve scapular strength to >=4+/5 to assist with dynamic cervico-thoracic alignment and stability. (not met, progressing)  2. Pt independent with HEP and demo's good return technique. (not met, progressing)  3. Pt to improve trunk and LE strength to 5/5 to allow for increased ease with stair climbing. (not met, progressing)  4. Pt to improve functional mobility with FOTO limitation <=40% disability. (not met, progressing)    Plan     Continue with POC for global strength and stability.  Plan of care Certification: 1/8/2020 to 04/08/2020.--- PN due by 02/08/2020    Marjorie Martines, PT

## 2020-01-30 ENCOUNTER — CLINICAL SUPPORT (OUTPATIENT)
Dept: REHABILITATION | Facility: OTHER | Age: 65
End: 2020-01-30
Payer: COMMERCIAL

## 2020-01-30 DIAGNOSIS — G89.29 CHRONIC NECK PAIN: ICD-10-CM

## 2020-01-30 DIAGNOSIS — G89.29 CHRONIC BILATERAL LOW BACK PAIN WITHOUT SCIATICA: ICD-10-CM

## 2020-01-30 DIAGNOSIS — R53.1 DECREASED STRENGTH, ENDURANCE, AND MOBILITY: ICD-10-CM

## 2020-01-30 DIAGNOSIS — Z74.09 DECREASED STRENGTH, ENDURANCE, AND MOBILITY: ICD-10-CM

## 2020-01-30 DIAGNOSIS — M54.50 CHRONIC BILATERAL LOW BACK PAIN WITHOUT SCIATICA: ICD-10-CM

## 2020-01-30 DIAGNOSIS — R68.89 DECREASED STRENGTH, ENDURANCE, AND MOBILITY: ICD-10-CM

## 2020-01-30 DIAGNOSIS — M54.2 CHRONIC NECK PAIN: ICD-10-CM

## 2020-01-30 PROCEDURE — 97110 THERAPEUTIC EXERCISES: CPT | Mod: PN

## 2020-02-02 ENCOUNTER — OFFICE VISIT (OUTPATIENT)
Dept: URGENT CARE | Facility: CLINIC | Age: 65
End: 2020-02-02
Payer: COMMERCIAL

## 2020-02-02 VITALS
WEIGHT: 196 LBS | HEIGHT: 68 IN | TEMPERATURE: 100 F | BODY MASS INDEX: 29.7 KG/M2 | DIASTOLIC BLOOD PRESSURE: 74 MMHG | OXYGEN SATURATION: 97 % | SYSTOLIC BLOOD PRESSURE: 121 MMHG | HEART RATE: 84 BPM

## 2020-02-02 DIAGNOSIS — J02.9 SORE THROAT: ICD-10-CM

## 2020-02-02 DIAGNOSIS — R53.83 FATIGUE, UNSPECIFIED TYPE: ICD-10-CM

## 2020-02-02 DIAGNOSIS — R06.02 SHORTNESS OF BREATH: ICD-10-CM

## 2020-02-02 DIAGNOSIS — R05.9 COUGH: ICD-10-CM

## 2020-02-02 DIAGNOSIS — J40 BRONCHITIS: Primary | ICD-10-CM

## 2020-02-02 LAB
CTP QC/QA: YES
CTP QC/QA: YES
FLUAV AG NPH QL: NEGATIVE
FLUBV AG NPH QL: NEGATIVE
MOLECULAR STREP A: NEGATIVE

## 2020-02-02 PROCEDURE — 87804 POCT INFLUENZA A/B: ICD-10-PCS | Mod: QW,S$GLB,, | Performed by: NURSE PRACTITIONER

## 2020-02-02 PROCEDURE — 96372 PR INJECTION,THERAP/PROPH/DIAG2ST, IM OR SUBCUT: ICD-10-PCS | Mod: 59,S$GLB,, | Performed by: NURSE PRACTITIONER

## 2020-02-02 PROCEDURE — 99214 OFFICE O/P EST MOD 30 MIN: CPT | Mod: 25,S$GLB,, | Performed by: NURSE PRACTITIONER

## 2020-02-02 PROCEDURE — 94640 PR INHAL RX, AIRWAY OBST/DX SPUTUM INDUCT: ICD-10-PCS | Mod: S$GLB,,, | Performed by: NURSE PRACTITIONER

## 2020-02-02 PROCEDURE — 71046 X-RAY EXAM CHEST 2 VIEWS: CPT | Mod: FY,S$GLB,, | Performed by: RADIOLOGY

## 2020-02-02 PROCEDURE — 87651 STREP A DNA AMP PROBE: CPT | Mod: QW,S$GLB,, | Performed by: NURSE PRACTITIONER

## 2020-02-02 PROCEDURE — 99214 PR OFFICE/OUTPT VISIT, EST, LEVL IV, 30-39 MIN: ICD-10-PCS | Mod: 25,S$GLB,, | Performed by: NURSE PRACTITIONER

## 2020-02-02 PROCEDURE — 87804 INFLUENZA ASSAY W/OPTIC: CPT | Mod: QW,S$GLB,, | Performed by: NURSE PRACTITIONER

## 2020-02-02 PROCEDURE — 96372 THER/PROPH/DIAG INJ SC/IM: CPT | Mod: 59,S$GLB,, | Performed by: NURSE PRACTITIONER

## 2020-02-02 PROCEDURE — 87651 POCT STREP A MOLECULAR: ICD-10-PCS | Mod: QW,S$GLB,, | Performed by: NURSE PRACTITIONER

## 2020-02-02 PROCEDURE — 94640 AIRWAY INHALATION TREATMENT: CPT | Mod: S$GLB,,, | Performed by: NURSE PRACTITIONER

## 2020-02-02 PROCEDURE — 71046 XR CHEST PA AND LATERAL: ICD-10-PCS | Mod: FY,S$GLB,, | Performed by: RADIOLOGY

## 2020-02-02 RX ORDER — CEFTRIAXONE 500 MG/1
500 INJECTION, POWDER, FOR SOLUTION INTRAMUSCULAR; INTRAVENOUS
Status: COMPLETED | OUTPATIENT
Start: 2020-02-02 | End: 2020-02-02

## 2020-02-02 RX ORDER — IPRATROPIUM BROMIDE 0.5 MG/2.5ML
0.5 SOLUTION RESPIRATORY (INHALATION)
Status: COMPLETED | OUTPATIENT
Start: 2020-02-02 | End: 2020-02-02

## 2020-02-02 RX ORDER — ALBUTEROL SULFATE 0.83 MG/ML
2.5 SOLUTION RESPIRATORY (INHALATION)
Status: COMPLETED | OUTPATIENT
Start: 2020-02-02 | End: 2020-02-02

## 2020-02-02 RX ORDER — ALPRAZOLAM 0.5 MG/1
0.5 TABLET ORAL
COMMUNITY
Start: 2018-04-11 | End: 2022-04-07 | Stop reason: SDUPTHER

## 2020-02-02 RX ORDER — MINERAL OIL
ENEMA (ML) RECTAL
COMMUNITY
Start: 2011-08-31 | End: 2023-06-21

## 2020-02-02 RX ORDER — PROMETHAZINE HYDROCHLORIDE AND DEXTROMETHORPHAN HYDROBROMIDE 6.25; 15 MG/5ML; MG/5ML
5 SYRUP ORAL
Qty: 118 ML | Refills: 0 | Status: SHIPPED | OUTPATIENT
Start: 2020-02-02 | End: 2020-02-12

## 2020-02-02 RX ORDER — BETAMETHASONE SODIUM PHOSPHATE AND BETAMETHASONE ACETATE 3; 3 MG/ML; MG/ML
9 INJECTION, SUSPENSION INTRA-ARTICULAR; INTRALESIONAL; INTRAMUSCULAR; SOFT TISSUE ONCE
Status: COMPLETED | OUTPATIENT
Start: 2020-02-02 | End: 2020-02-02

## 2020-02-02 RX ORDER — BENZONATATE 200 MG/1
200 CAPSULE ORAL 3 TIMES DAILY PRN
Qty: 20 CAPSULE | Refills: 0 | Status: SHIPPED | OUTPATIENT
Start: 2020-02-02 | End: 2020-02-12

## 2020-02-02 RX ORDER — ALBUTEROL SULFATE 90 UG/1
2 AEROSOL, METERED RESPIRATORY (INHALATION) EVERY 6 HOURS PRN
Qty: 6.7 G | Refills: 0 | Status: SHIPPED | OUTPATIENT
Start: 2020-02-02

## 2020-02-02 RX ORDER — FLUOXETINE HYDROCHLORIDE 40 MG/1
CAPSULE ORAL
COMMUNITY
Start: 2018-05-03

## 2020-02-02 RX ADMIN — IPRATROPIUM BROMIDE 0.5 MG: 0.5 SOLUTION RESPIRATORY (INHALATION) at 01:02

## 2020-02-02 RX ADMIN — ALBUTEROL SULFATE 2.5 MG: 0.83 SOLUTION RESPIRATORY (INHALATION) at 01:02

## 2020-02-02 RX ADMIN — CEFTRIAXONE 500 MG: 500 INJECTION, POWDER, FOR SOLUTION INTRAMUSCULAR; INTRAVENOUS at 01:02

## 2020-02-02 RX ADMIN — BETAMETHASONE SODIUM PHOSPHATE AND BETAMETHASONE ACETATE 9 MG: 3; 3 INJECTION, SUSPENSION INTRA-ARTICULAR; INTRALESIONAL; INTRAMUSCULAR; SOFT TISSUE at 01:02

## 2020-02-02 NOTE — PROGRESS NOTES
"Subjective:       Patient ID: Dasia Henning is a 64 y.o. female.    Vitals:  height is 5' 8" (1.727 m) and weight is 88.9 kg (196 lb). Her temperature is 99.5 °F (37.5 °C). Her blood pressure is 121/74 and her pulse is 84. Her oxygen saturation is 97%.     Chief Complaint: URI    URI    This is a new problem. Episode onset: 3 days. The problem has been gradually worsening. Maximum temperature: 99.6. Associated symptoms include congestion, coughing, headaches, rhinorrhea and a sore throat. Pertinent negatives include no ear pain, nausea, rash, sinus pain, vomiting or wheezing. She has tried antihistamine, decongestant, acetaminophen and NSAIDs for the symptoms. The treatment provided mild relief.       Constitution: Positive for chills, fatigue and fever. Negative for sweating.   HENT: Positive for congestion, sore throat, trouble swallowing and voice change. Negative for ear pain, sinus pain and sinus pressure.    Neck: Negative for painful lymph nodes.   Eyes: Negative for eye redness.   Respiratory: Positive for chest tightness and cough. Negative for sputum production, bloody sputum, COPD, shortness of breath, stridor, wheezing and asthma.    Gastrointestinal: Negative for nausea and vomiting.   Musculoskeletal: Positive for muscle ache.   Skin: Negative for rash.   Allergic/Immunologic: Negative for seasonal allergies and asthma.   Neurological: Positive for headaches.   Hematologic/Lymphatic: Negative for swollen lymph nodes.       Objective:      Physical Exam   Constitutional: She is oriented to person, place, and time. She appears well-developed and well-nourished. She is cooperative.  Non-toxic appearance. She does not have a sickly appearance. She does not appear ill. No distress.   HENT:   Head: Normocephalic and atraumatic.   Right Ear: Hearing, tympanic membrane, external ear and ear canal normal.   Left Ear: Hearing, tympanic membrane, external ear and ear canal normal.   Nose: Mucosal edema and " rhinorrhea present. No nasal deformity. No epistaxis. Right sinus exhibits no maxillary sinus tenderness and no frontal sinus tenderness. Left sinus exhibits no maxillary sinus tenderness and no frontal sinus tenderness.   Mouth/Throat: Uvula is midline and mucous membranes are normal. No trismus in the jaw. Normal dentition. No uvula swelling. Posterior oropharyngeal edema and posterior oropharyngeal erythema present. No oropharyngeal exudate.   Eyes: Pupils are equal, round, and reactive to light. Conjunctivae, EOM and lids are normal. No scleral icterus.   Neck: Trachea normal, normal range of motion, full passive range of motion without pain and phonation normal. Neck supple. No neck rigidity. No edema and no erythema present.   Cardiovascular: Normal rate, regular rhythm, normal heart sounds, intact distal pulses and normal pulses.   Pulmonary/Chest: Effort normal. No respiratory distress. She has no decreased breath sounds. She has wheezes. She has rhonchi.   Abdominal: Normal appearance.   Musculoskeletal: Normal range of motion. She exhibits no edema or deformity.   Neurological: She is alert and oriented to person, place, and time. She exhibits normal muscle tone. Coordination normal.   Skin: Skin is warm, dry, intact, not diaphoretic and not pale.   Psychiatric: She has a normal mood and affect. Her speech is normal and behavior is normal. Judgment and thought content normal. Cognition and memory are normal.   Nursing note and vitals reviewed.        Assessment:       1. Fatigue, unspecified type    2. Sore throat    3. Cough        Plan:         Fatigue, unspecified type  -     POCT Influenza A/B    Sore throat  -     POCT Strep A, Molecular    Cough

## 2020-02-02 NOTE — PATIENT INSTRUCTIONS
Urgent Care Management:  - Treatment plan discussed.  - PCP recommendations given.  - Return precautions advised.  - Patient agrees with and understands plan of care.    Patient Instructions, Education, Teaching and Summary of Visit:      RETURN TO CLINIC IF SYMPTOMS WORSEN OR CALL 911 IMMEDIATELY FOR SHORTNESS OF BREATH, CHEST PAIN, DIZZINESS, WORSENING PAIN, NAUSEA AND VOMITING, HEART PALPITATIONS, FEVER AND/OR NECK STIFFNESS. FOLLOW UP WITH PRIMARY CARE PROVIDER IN THE AM.    -Diagnosis and treatment plan discussed with patient.  -Patient agreed with my treatment plan.  -Patient will follow up with primary care provider or Specialty Provider, as discussed.     -If you were prescribed a narcotic or controlled medication, do not drive or operate heavy equipment or machinery while taking these medications.  -You must understand that you've received an Urgent Care treatment only and that you may be released before all your medical problems are known or treated.   -You, the patient, will arrange for follow up care as instructed.  -Follow up with your PCP or specialty clinic as directed in the next 1-2 weeks if not improved or as needed.    -You can call [phone] to schedule an appointment with the appropriate provider.  -If your condition worsens we recommend that you receive another evaluation at the emergency room immediately or contact your primary medical clinics after hours call service to discuss your concerns.  -Please return here or go to the Emergency Department for any concerns or worsening of condition.  When You Have a Sore Throat    A sore throat can be painful. There are many reasons why you may have a sore throat. Your healthcare provider will work with you to find the cause of your sore throat. He or she will also find the best treatment for you.  What causes a sore throat?  Sore throats can be caused or worsened by:  · Cold or flu viruses  · Bacteria  · Irritants such as tobacco smoke or air  pollution  · Acid reflux  A healthy throat  The tonsils are on the sides of the throat near the base of the tongue. They collect viruses and bacteria and help fight infection. The throat (pharynx) is the passage for air. Mucus from the nasal cavity also moves down the passage.  An inflamed throat  The tonsils and pharynx can become inflamed due to a cold or flu virus. Postnasal drip (excess mucus draining from the nasal cavity) can irritate the throat. It can also make the throat or tonsils more likely to be infected by bacteria. Severe, untreated tonsillitis in children or adults can cause a pocket of pus (abscess) to form near the tonsil.  Your evaluation  A medical evaluation can help find the cause of your sore throat. It can also help your healthcare provider choose the best treatment for you. The evaluation may include a health history, physical exam, and diagnostic tests.  Health history  Your healthcare provider may ask you the following:  · How long has the sore throat lasted and how have you been treating it?  · Do you have any other symptoms, such as body aches, fever, or cough?  · Does your sore throat recur? If so, how often? How many days of school or work have you missed because of a sore throat?  · Do you have trouble eating or swallowing?  · Have you been told that you snore or have other sleep problems?  · Do you have bad breath?  · Do you cough up bad-tasting mucus?  Physical exam  During the exam, your healthcare provider checks your ears, nose, and throat for problems. He or she also checks for swelling in the neck, and may listen to your chest.  Possible tests  Other tests your healthcare provider may perform include:  · A throat swab to check for bacteria such as streptococcus (the bacteria that causes strep throat)  · A blood test to check for mononucleosis (a viral infection)  · A chest X-ray to rule out pneumonia, especially if you have a cough  Treating a sore throat  Treatment depends on  "many factors. What is the likely cause? Is the problem recent? Does it keep coming back? In many cases, the best thing to do is to treat the symptoms, rest, and let the problem heal itself. Antibiotics may help clear up some bacterial infections. For cases of severe or recurring tonsillitis, the tonsils may need to be removed.  Relieving your symptoms  · Dont smoke, and avoid secondhand smoke.  · For children, try throat sprays or Popsicles. Adults and older children may try lozenges.  · Drink warm liquids to soothe the throat and help thin mucus. Avoid alcohol, spicy foods, and acidic drinks such as orange juice. These can irritate the throat.  · Gargle with warm saltwater (1 teaspoon of salt to 8 ounces of warm water).  · Use a humidifier to keep air moist and relieve throat dryness.  · Try over-the-counter pain relievers such as acetaminophen or ibuprofen. Use as directed, and dont exceed the recommended dose. Dont give aspirin to children.   Are antibiotics needed?  If your sore throat is due to a bacterial infection, antibiotics may speed healing and prevent complications. Although group A streptococcus ("strep throat" or GAS) is the major treatable infection for a sore throat, GAS causes only 5% to 15% of sore throats in adults who seek medical care. Most sore throats are caused by cold or flu viruses. And antibiotics dont treat viral illness. In fact, using antibiotics when theyre not needed may produce bacteria that are harder to kill. Your healthcare provider will prescribe antibiotics only if he or she thinks they are likely to help.  If antibiotics are prescribed  Take the medicine exactly as directed. Be sure to finish your prescription even if youre feeling better. And be sure to ask your healthcare provider or pharmacist what side effects are common and what to do about them.  Is surgery needed?  In some cases, tonsils need to be removed. This is often done as outpatient (same-day) surgery. Your " healthcare provider may advise removing the tonsils in cases of:  · Several severe bouts of tonsillitis in a year. Severe episodes include those that lead to missed days of school or work, or that need to be treated with antibiotics.  · Tonsillitis that causes breathing problems during sleep  · Tonsillitis caused by food particles collecting in pouches in the tonsils (cryptic tonsillitis)  Call your healthcare provider if any of the following occur:  · Symptoms worsen, or new symptoms develop.  · Swollen tonsils make breathing difficult.  · The pain is severe enough to keep you from drinking liquids.  · A skin rash, hives, or wheezing develops. Any of these could signal an allergic reaction to antibiotics.  · Symptoms dont improve within a week.  · Symptoms dont improve within 2 to 3 days of starting antibiotics.   Date Last Reviewed: 10/1/2016  © 5296-2649 [employer]. [address] [place] [address]. All rights reserved. This information is not intended as a substitute for professional medical care. Always follow your healthcare professional's instructions.

## 2020-02-07 ENCOUNTER — CLINICAL SUPPORT (OUTPATIENT)
Dept: REHABILITATION | Facility: OTHER | Age: 65
End: 2020-02-07
Payer: COMMERCIAL

## 2020-02-07 DIAGNOSIS — G89.29 CHRONIC BILATERAL LOW BACK PAIN WITHOUT SCIATICA: ICD-10-CM

## 2020-02-07 DIAGNOSIS — R68.89 DECREASED STRENGTH, ENDURANCE, AND MOBILITY: ICD-10-CM

## 2020-02-07 DIAGNOSIS — Z74.09 DECREASED STRENGTH, ENDURANCE, AND MOBILITY: ICD-10-CM

## 2020-02-07 DIAGNOSIS — R53.1 DECREASED STRENGTH, ENDURANCE, AND MOBILITY: ICD-10-CM

## 2020-02-07 DIAGNOSIS — G89.29 CHRONIC NECK PAIN: ICD-10-CM

## 2020-02-07 DIAGNOSIS — M54.50 CHRONIC BILATERAL LOW BACK PAIN WITHOUT SCIATICA: ICD-10-CM

## 2020-02-07 DIAGNOSIS — M54.2 CHRONIC NECK PAIN: ICD-10-CM

## 2020-02-07 PROCEDURE — 97110 THERAPEUTIC EXERCISES: CPT | Mod: PN | Performed by: PHYSICAL THERAPIST

## 2020-02-07 NOTE — PROGRESS NOTES
"  Physical Therapy Daily Treatment Note     Name: Dasia Henning  Clinic Number: [id number]    Therapy Diagnosis:   Encounter Diagnoses   Name Primary?    Chronic neck pain     Chronic bilateral low back pain without sciatica     Decreased strength, endurance, and mobility      Physician: Mandie Gray PA-C    Visit Date: 2/7/2020    Physician Orders: PT Eval and Treat   Medical Diagnosis from Referral:   M43.16 (ICD-10-CM) - Spondylolisthesis of lumbar region   M51.36 (ICD-10-CM) - DDD (degenerative disc disease), lumbar   M50.30 (ICD-10-CM) - DDD (degenerative disc disease), cervical         Evaluation Date: 1/8/2020  Authorization Period Expiration: 12/15/2020  Plan of Care Expiration: 04/08/2020  Visit # / Visits authorized: 5/ 19 + 1/1 (eval)     Time In:  8:05am  Time Out: 8:50 am  Total Billable Time: 45 minutes     Precautions: Standard, Hx L tibial spiral Fx with closed reduction (18 years ago), Hx L knee OA with meniscal tear    Subjective     Pt reports:she's having pain to R low back this morning.     She was compliant with home exercise program.  Response to previous treatment: good  Functional change: none    Pain: 5/10  Location: right low back (indicates R SIJ and across LB along beltlne), bilateral neck (indicates upper traps)       Objective     Dasia received therapeutic exercises to develop strength, endurance, ROM, flexibility, posture and core stabilization for 40 minutes including:    Self MET for R anterior innominate rotation w/ dowel 6 x 10"  PPT 20x  PPT with marches 30x  PPT with Heel slides 30x  Figure 4 piriformis stretch 3 x 30" - VC for technique  supine hl hip abd 2 x 15 x GTB  supine BKFO 2 x 15 x GTB  HL hip add 2 min x 5" holds  SL hip abd: 2 x 15  +SL [place] 2 x 15  Prone glute ets 10 x 10"  Butt winks (prone) x 10 x 10"  prone hip ext 2 x 15 w/ glute set  prone donkey kicks 2 x 15 w/ glute set      rows: 2 x 10 x GTB  SALPD: 2 x 10 x GTB  anti rotations: 2 x 10 x " OTB  side stepping: 2 x 40 ft x OTB at knees      Dasia received the following manual therapy techniques:   00 min x MET for R anterior innominate rotation  5 min x preparation and application of kinesiotape w/ star technique over R sacral sulcus for SIJ stability and pain reduction. Patient received education regarding appropriate care and removal of Kinesiotape. Patient instructed in proper removal techniques if skin irritation occurs.   00 min x dry needling - see note by Marjorie Price, PT    Modalities: 00 min x MHP to low back at beginning of session    Home Exercises Provided and Patient Education Provided     Education provided:   - none    Written Home Exercises Provided: Patient instructed to cont prior HEP.  Exercises were reviewed and Dasia was able to demonstrate them prior to the end of the session.  Dasia demonstrated good  understanding of the education provided.     See EMR under Patient Instructions for exercises provided prior visit.    Assessment     Good tolerance to treatment today. Verbal and tactile cuing for positioning with SL abd. Initiated hip IR strengthening today with good tolerance.     Dasia is progressing well towards her goals.   Pt prognosis is Good.     Pt will continue to benefit from skilled outpatient physical therapy to address the deficits listed in the problem list box on initial evaluation, provide pt/family education and to maximize pt's level of independence in the home and community environment.     Pt's spiritual, cultural and educational needs considered and pt agreeable to plan of care and goals.     Anticipated barriers to physical therapy: standard, occupation    Goals:   Short Term Goals: 6 weeks   1. Pt to improve scapular strength by a half grade to assist with dynamic cervico-thoracic alignment and stability. (not met, progressing)  2. Pt to improve pelvic alignment to be able to perform bed mobility without pain or difficulty. (not met, progressing)  3. Pt  to improve trunk and LE strength by a half grade to allow for increased ease with stair climbing. (not met, progressing)  4. Pt to improve functional mobility with FOTO limitation <=50% disability. (not met, progressing)     Long Term Goals: 12 weeks   1. Pt to improve scapular strength to >=4+/5 to assist with dynamic cervico-thoracic alignment and stability. (not met, progressing)  2. Pt independent with HEP and demo's good return technique. (not met, progressing)  3. Pt to improve trunk and LE strength to 5/5 to allow for increased ease with stair climbing. (not met, progressing)  4. Pt to improve functional mobility with FOTO limitation <=40% disability. (not met, progressing)    Plan     Continue with POC for global strength and stability.  Plan of care Certification: 1/8/2020 to 04/08/2020.--- PN due by 02/08/2020    Sarah Richardson, PT

## 2020-02-11 ENCOUNTER — CLINICAL SUPPORT (OUTPATIENT)
Dept: REHABILITATION | Facility: OTHER | Age: 65
End: 2020-02-11
Payer: COMMERCIAL

## 2020-02-11 DIAGNOSIS — Z74.09 DECREASED STRENGTH, ENDURANCE, AND MOBILITY: ICD-10-CM

## 2020-02-11 DIAGNOSIS — M54.50 CHRONIC BILATERAL LOW BACK PAIN WITHOUT SCIATICA: ICD-10-CM

## 2020-02-11 DIAGNOSIS — G89.29 CHRONIC NECK PAIN: ICD-10-CM

## 2020-02-11 DIAGNOSIS — G89.29 CHRONIC BILATERAL LOW BACK PAIN WITHOUT SCIATICA: ICD-10-CM

## 2020-02-11 DIAGNOSIS — R53.1 DECREASED STRENGTH, ENDURANCE, AND MOBILITY: ICD-10-CM

## 2020-02-11 DIAGNOSIS — R68.89 DECREASED STRENGTH, ENDURANCE, AND MOBILITY: ICD-10-CM

## 2020-02-11 DIAGNOSIS — M54.2 CHRONIC NECK PAIN: ICD-10-CM

## 2020-02-11 PROCEDURE — 97110 THERAPEUTIC EXERCISES: CPT | Mod: PN

## 2020-02-11 NOTE — PROGRESS NOTES
"  Physical Therapy Daily Treatment Note     Name: Dasia Henning  Clinic Number: [id number]    Therapy Diagnosis:   Encounter Diagnoses   Name Primary?    Chronic neck pain     Chronic bilateral low back pain without sciatica     Decreased strength, endurance, and mobility      Physician: Mandie Gray PA-C    Visit Date: 2/11/2020    Physician Orders: PT Eval and Treat   Medical Diagnosis from Referral:   M43.16 (ICD-10-CM) - Spondylolisthesis of lumbar region   M51.36 (ICD-10-CM) - DDD (degenerative disc disease), lumbar   M50.30 (ICD-10-CM) - DDD (degenerative disc disease), cervical         Evaluation Date: 1/8/2020  Authorization Period Expiration: 12/15/2020  Plan of Care Expiration: 04/08/2020  Visit # / Visits authorized: 6/ 19 + 1/1 (eval)     Time In:  3:00 pm  Time Out: 3:55 pm  Total Billable Time: 55 minutes     Precautions: Standard, Hx L tibial spiral Fx with closed reduction (18 years ago), Hx L knee OA with meniscal tear    Subjective     Pt reports: that her R hip "went out" on her over the weekend. It hurt too much on the same day to do her HEP, but was able to reduce her symptoms the next day with her MET.    She was compliant with home exercise program.  Response to previous treatment: good  Functional change: none    Pain: 5/10  Location: right low back (indicates R SIJ and across LB along beltlne), bilateral neck (indicates upper traps)       Objective     Dasia received therapeutic exercises to develop strength, endurance, ROM, flexibility, posture and core stabilization for 55 minutes including:    Self MET for R anterior innominate rotation w/ dowel 6 x 10"  PPT 20x  PPT with marches 30x  PPT with Heel slides 30x  Figure 4 piriformis stretch 3 x 30" - VC for technique  supine hl hip abd 2 x 15 x GTB  supine BKFO 2 x 15 x GTB  HL hip add 2 min x 5" holds  SL hip abd: 2 x 15  SL [place] 2 x 15  Prone glute ets 10 x 10"  Butt winks (prone) x 10 x 10"  prone hip ext 2 x 15 w/ glute " set  prone donkey kicks 2 x 15 w/ glute set      rows: 2 x 15 x GTB  SALPD: 2 x 15 x GTB  anti rotations: 2 x 10 x OTB  side stepping: 2 x 40 ft x GTB at knees  Monster walks:  2 x 40 ft x GTB at knees     Dasia received the following manual therapy techniques:   00 min x MET for R anterior innominate rotation  00 min x preparation and application of kinesiotape w/ star technique over R sacral sulcus for SIJ stability and pain reduction. Patient received education regarding appropriate care and removal of Kinesiotape. Patient instructed in proper removal techniques if skin irritation occurs.   00 min x dry needling - see note by Marjorie Price, PT    Modalities: 00 min x MHP to low back at beginning of session    Home Exercises Provided and Patient Education Provided     Education provided:   - 2/11/2020 - updated HEP: prone hip ext, prone donkey kicks, BKFO, HL hip abd, narrow rows, SALPD - green TB given today    Written Home Exercises Provided: Patient instructed to cont prior HEP.  Exercises were reviewed and Dasia was able to demonstrate them prior to the end of the session.  Dasia demonstrated good  understanding of the education provided.     See EMR under Patient Instructions for exercises provided prior visit.    Assessment     Defer kinesiotaping due to time constraints today. Good tolerance with therex today without increased symptoms. Plan to progress trunk and pelvic strength and endurance today for increased SIJ stability. Updated HEP to progress strengthening at home.    Dasia is progressing well towards her goals.   Pt prognosis is Good.     Pt will continue to benefit from skilled outpatient physical therapy to address the deficits listed in the problem list box on initial evaluation, provide pt/family education and to maximize pt's level of independence in the home and community environment.     Pt's spiritual, cultural and educational needs considered and pt agreeable to plan of care and  goals.     Anticipated barriers to physical therapy: standard, occupation    Goals:   Short Term Goals: 6 weeks   1. Pt to improve scapular strength by a half grade to assist with dynamic cervico-thoracic alignment and stability. (not met, progressing)  2. Pt to improve pelvic alignment to be able to perform bed mobility without pain or difficulty. (not met, progressing)  3. Pt to improve trunk and LE strength by a half grade to allow for increased ease with stair climbing. (not met, progressing)  4. Pt to improve functional mobility with FOTO limitation <=50% disability. (not met, progressing)     Long Term Goals: 12 weeks   1. Pt to improve scapular strength to >=4+/5 to assist with dynamic cervico-thoracic alignment and stability. (not met, progressing)  2. Pt independent with HEP and demo's good return technique. (not met, progressing)  3. Pt to improve trunk and LE strength to 5/5 to allow for increased ease with stair climbing. (not met, progressing)  4. Pt to improve functional mobility with FOTO limitation <=40% disability. (not met, progressing)    Plan     Continue with POC for global strength and stability.  Plan of care Certification: 1/8/2020 to 04/08/2020.--- PN due by 02/08/2020    Marjorie Martines, PT

## 2020-02-13 ENCOUNTER — CLINICAL SUPPORT (OUTPATIENT)
Dept: REHABILITATION | Facility: OTHER | Age: 65
End: 2020-02-13
Payer: COMMERCIAL

## 2020-02-13 DIAGNOSIS — M54.50 CHRONIC BILATERAL LOW BACK PAIN WITHOUT SCIATICA: ICD-10-CM

## 2020-02-13 DIAGNOSIS — R68.89 DECREASED STRENGTH, ENDURANCE, AND MOBILITY: ICD-10-CM

## 2020-02-13 DIAGNOSIS — R53.1 DECREASED STRENGTH, ENDURANCE, AND MOBILITY: ICD-10-CM

## 2020-02-13 DIAGNOSIS — G89.29 CHRONIC BILATERAL LOW BACK PAIN WITHOUT SCIATICA: ICD-10-CM

## 2020-02-13 DIAGNOSIS — Z74.09 DECREASED STRENGTH, ENDURANCE, AND MOBILITY: ICD-10-CM

## 2020-02-13 DIAGNOSIS — G89.29 CHRONIC NECK PAIN: ICD-10-CM

## 2020-02-13 DIAGNOSIS — M54.2 CHRONIC NECK PAIN: ICD-10-CM

## 2020-02-13 PROCEDURE — 97110 THERAPEUTIC EXERCISES: CPT | Mod: PN

## 2020-02-13 NOTE — PROGRESS NOTES
"  Physical Therapy Daily Treatment Note     Name: Dasia Henning  Clinic Number: [id number]    Therapy Diagnosis:   Encounter Diagnoses   Name Primary?    Chronic neck pain     Chronic bilateral low back pain without sciatica     Decreased strength, endurance, and mobility      Physician: Mandie Gray PA-C    Visit Date: 2/13/2020    Physician Orders: PT Eval and Treat   Medical Diagnosis from Referral:   M43.16 (ICD-10-CM) - Spondylolisthesis of lumbar region   M51.36 (ICD-10-CM) - DDD (degenerative disc disease), lumbar   M50.30 (ICD-10-CM) - DDD (degenerative disc disease), cervical         Evaluation Date: 1/8/2020  Authorization Period Expiration: 12/15/2020  Plan of Care Expiration: 04/08/2020  Visit # / Visits authorized: 7/ 19 + 1/1 (eval)     Time In:  4:08 pm  Time Out: 4:55 pm  Total Billable Time: 47 minutes     Precautions: Standard, Hx L tibial spiral Fx with closed reduction (18 years ago), Hx L knee OA with meniscal tear    Subjective     Pt reports: that she did not get tape last visit and notes a big difference in pain control with vs without it. Pt leaving to go to [place] for a week beginning on 2/22/2020 and is nervous about having pain while OOT.    She was compliant with home exercise program.  Response to previous treatment: good  Functional change: none    Pain: 5/10  Location: right low back (indicates R SIJ and across LB along beltlne), bilateral neck (indicates upper traps)       Objective     Dasia received therapeutic exercises to develop strength, endurance, ROM, flexibility, posture and core stabilization for 40 minutes including:    Self MET for R anterior innominate rotation w/ dowel 6 x 10"  PPT 20x  PPT with marches 30x  PPT with Heel slides 30x  Figure 4 piriformis stretch 3 x 30" - VC for technique  supine hl hip abd 2 x 15 x GTB  supine BKFO 2 x 15 x GTB  HL hip add 2 min x 5" holds  SL hip abd: 2 x 15  SL [place] 2 x 15  Prone glute ets 10 x 10"  Butt winks (prone) x 10 " "x 10"  prone hip ext 2 x 15 w/ glute set  prone donkey kicks 2 x 15 w/ glute set      rows: 2 x 15 x BTB  SALPD: 2 x 15 x GTB  anti rotations: 2 x 10 x OTB  side stepping: 4 x 40 ft x GTB at knees  Monster walks:  4 x 40 ft x GTB at knees   +shuttle DL 3 x 10 x 3 black cords - OTB around knees - change to GTB next visit  +shuttle SL 3 x 10 x 1 black cord - [place]  +step ups 1 x 15 x 6"    Dasia received the following manual therapy techniques:   7 min x MET for R anterior innominate rotation  00 min x preparation and application of kinesiotape w/ star technique over R sacral sulcus for SIJ stability and pain reduction. Patient received education regarding appropriate care and removal of Kinesiotape. Patient instructed in proper removal techniques if skin irritation occurs.   00 min x dry needling - see note by Marjorie Price, PT    Modalities: 00 min x MHP to low back at beginning of session    Home Exercises Provided and Patient Education Provided     Education provided:   - 2/11/2020 - updated HEP: prone hip ext, prone donkey kicks, BKFO, HL hip abd, narrow rows, SALPD - green TB given today    Written Home Exercises Provided: Patient instructed to cont prior HEP.  Exercises were reviewed and Dasia was able to demonstrate them prior to the end of the session.  Dasia demonstrated good  understanding of the education provided.     See EMR under Patient Instructions for exercises provided prior visit.    Assessment     Increased reps with LE resistance exercises to promote glute strength and SIJ stability. Fatigue noted with increased reps. Initiated step ups today to promote glute strength. Sharp pain noted with step up using LLE, which reduced with simultaneous glute set. Advised pt to invest in SIJ stability belt to reduce pain and improve function while OOT.    Dasia is progressing well towards her goals.   Pt prognosis is Good.     Pt will continue to benefit from skilled outpatient physical therapy to address " the deficits listed in the problem list box on initial evaluation, provide pt/family education and to maximize pt's level of independence in the home and community environment.     Pt's spiritual, cultural and educational needs considered and pt agreeable to plan of care and goals.     Anticipated barriers to physical therapy: standard, occupation    Goals:   Short Term Goals: 6 weeks   1. Pt to improve scapular strength by a half grade to assist with dynamic cervico-thoracic alignment and stability. (not met, progressing)  2. Pt to improve pelvic alignment to be able to perform bed mobility without pain or difficulty. (not met, progressing)  3. Pt to improve trunk and LE strength by a half grade to allow for increased ease with stair climbing. (not met, progressing)  4. Pt to improve functional mobility with FOTO limitation <=50% disability. (not met, progressing)     Long Term Goals: 12 weeks   1. Pt to improve scapular strength to >=4+/5 to assist with dynamic cervico-thoracic alignment and stability. (not met, progressing)  2. Pt independent with HEP and demo's good return technique. (not met, progressing)  3. Pt to improve trunk and LE strength to 5/5 to allow for increased ease with stair climbing. (not met, progressing)  4. Pt to improve functional mobility with FOTO limitation <=40% disability. (not met, progressing)    Plan     Continue with POC for global strength and stability.  Plan of care Certification: 1/8/2020 to 04/08/2020.--- PN due by 02/08/2020    Marjorie Martines, PT

## 2020-02-17 ENCOUNTER — CLINICAL SUPPORT (OUTPATIENT)
Dept: REHABILITATION | Facility: OTHER | Age: 65
End: 2020-02-17
Payer: COMMERCIAL

## 2020-02-17 DIAGNOSIS — R68.89 DECREASED STRENGTH, ENDURANCE, AND MOBILITY: ICD-10-CM

## 2020-02-17 DIAGNOSIS — G89.29 CHRONIC BILATERAL LOW BACK PAIN WITHOUT SCIATICA: ICD-10-CM

## 2020-02-17 DIAGNOSIS — Z74.09 DECREASED STRENGTH, ENDURANCE, AND MOBILITY: ICD-10-CM

## 2020-02-17 DIAGNOSIS — M54.50 CHRONIC BILATERAL LOW BACK PAIN WITHOUT SCIATICA: ICD-10-CM

## 2020-02-17 DIAGNOSIS — M54.2 CHRONIC NECK PAIN: ICD-10-CM

## 2020-02-17 DIAGNOSIS — G89.29 CHRONIC NECK PAIN: ICD-10-CM

## 2020-02-17 DIAGNOSIS — R53.1 DECREASED STRENGTH, ENDURANCE, AND MOBILITY: ICD-10-CM

## 2020-02-17 PROCEDURE — 97110 THERAPEUTIC EXERCISES: CPT | Mod: PN

## 2020-02-17 PROCEDURE — 97140 MANUAL THERAPY 1/> REGIONS: CPT | Mod: PN

## 2020-02-17 RX ORDER — MELOXICAM 15 MG/1
15 TABLET ORAL DAILY
Qty: 30 TABLET | Refills: 0 | Status: SHIPPED | OUTPATIENT
Start: 2020-02-17 | End: 2021-07-12 | Stop reason: SDUPTHER

## 2020-02-17 RX ORDER — METHOCARBAMOL 750 MG/1
750 TABLET, FILM COATED ORAL 3 TIMES DAILY PRN
Qty: 60 TABLET | Refills: 0 | Status: SHIPPED | OUTPATIENT
Start: 2020-02-17 | End: 2021-07-12 | Stop reason: SDUPTHER

## 2020-02-17 NOTE — PROGRESS NOTES
"  Physical Therapy Daily Treatment Note     Name: Dasia Henning  Clinic Number: [id number]    Therapy Diagnosis:   Encounter Diagnoses   Name Primary?    Chronic neck pain     Chronic bilateral low back pain without sciatica     Decreased strength, endurance, and mobility      Physician: Mandie Gray PA-C    Visit Date: 2/17/2020    Physician Orders: PT Eval and Treat   Medical Diagnosis from Referral:   M43.16 (ICD-10-CM) - Spondylolisthesis of lumbar region   M51.36 (ICD-10-CM) - DDD (degenerative disc disease), lumbar   M50.30 (ICD-10-CM) - DDD (degenerative disc disease), cervical         Evaluation Date: 1/8/2020  Authorization Period Expiration: 12/15/2020  Plan of Care Expiration: 04/08/2020  Visit # / Visits authorized: 8/ 19 + 1/1 (eval)     Time In:  8:00 am  Time Out: 9:00 am  Total Billable Time: 38 minutes     Precautions: Standard, Hx L tibial spiral Fx with closed reduction (18 years ago), Hx L knee OA with meniscal tear    Subjective     Pt reports: that she did a lot of standing over the weekend 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 Noted sharp pain in the R low back that night "as if my back locked up." She says that it spread to the L side and had mild tingling down the leg. Symptoms made it very painful to get OOB. Had to take pain medication to reduce symptoms as they did not improve following doing her HEP. Feeling slightly better today.  She was compliant with home exercise program.  Response to previous treatment: good  Functional change: none    Pain: 7/10  Location: right low back (indicates R SIJ and across LB along beltlne), bilateral neck (indicates upper traps)       Objective     Dasia received therapeutic exercises to develop strength, endurance, ROM, flexibility, posture and core stabilization for 12 minutes including:    **Exercises in BOLD performed today**    Self MET for R anterior innominate rotation w/ dowel 6 x 10"  +blake pose stretch 3 x 20"  +SKTC - demo'd for HEP  +DKTC - " "demo'd for HEP  PPT 20x  PPT with marches 30x  PPT with Heel slides 30x  Figure 4 piriformis stretch 3 x 30" - VC for technique  supine hl hip abd 2 x 15 x GTB  supine BKFO 2 x 15 x GTB  HL hip add 2 min x 5" holds  SL hip abd: 2 x 15  SL Bradly 2 x 15  Prone glute ets 10 x 10"  Butt winks (prone) x 10 x 10"  prone hip ext 2 x 15 w/ glute set  prone donkey kicks 2 x 15 w/ glute set      rows: 2 x 15 x BTB  SALPD: 2 x 15 x GTB  anti rotations: 2 x 10 x OTB  side stepping: 4 x 40 ft x GTB at knees  Monster walks:  4 x 40 ft x GTB at knees   +shuttle DL 3 x 10 x 3 black cords - OTB around knees - change to GTB next visit  +shuttle SL 3 x 10 x 1 black cord - beronica  +step ups 1 x 15 x 6"    Dasia received the following manual therapy techniques:   8 min x MET for R anterior innominate rotation  00 min x preparation and application of kinesiotape w/ star technique over R sacral sulcus for SIJ stability and pain reduction. Patient received education regarding appropriate care and removal of Kinesiotape. Patient instructed in proper removal techniques if skin irritation occurs.   20 min x dry needling - see note by Marjorie Price, PT    Modalities:   00 min x MHP to low back at beginning of session  10 min x ice to low back - beginning of session today    Home Exercises Provided and Patient Education Provided     Education provided:   - 2/11/2020 - updated HEP: prone hip ext, prone donkey kicks, BKFO, HL hip abd, narrow rows, SALPD - green TB given today    Written Home Exercises Provided: Patient instructed to cont prior HEP.  Exercises were reviewed and Dasia was able to demonstrate them prior to the end of the session.  Dasia demonstrated good  understanding of the education provided.     See EMR under Patient Instructions for exercises provided prior visit.    Assessment     Pt presents with acute flare up of symptoms with prolonged standing. Poor progression of supine therex due to flare up. Presents with increased " tone/tenderness of R low back musculature with increased R anterior rotation of innominate. Mild improvement in SIJ position and c/o pain with MET. Reduced pain and improved tolerance with standing, walking, and bed mobility following dry needling today.   Dasia is progressing well towards her goals.   Pt prognosis is Good.     Pt will continue to benefit from skilled outpatient physical therapy to address the deficits listed in the problem list box on initial evaluation, provide pt/family education and to maximize pt's level of independence in the home and community environment.     Pt's spiritual, cultural and educational needs considered and pt agreeable to plan of care and goals.     Anticipated barriers to physical therapy: standard, occupation    Goals:   Short Term Goals: 6 weeks   1. Pt to improve scapular strength by a half grade to assist with dynamic cervico-thoracic alignment and stability. (not met, progressing)  2. Pt to improve pelvic alignment to be able to perform bed mobility without pain or difficulty. (not met, progressing)  3. Pt to improve trunk and LE strength by a half grade to allow for increased ease with stair climbing. (not met, progressing)  4. Pt to improve functional mobility with FOTO limitation <=50% disability. (not met, progressing)     Long Term Goals: 12 weeks   1. Pt to improve scapular strength to >=4+/5 to assist with dynamic cervico-thoracic alignment and stability. (not met, progressing)  2. Pt independent with HEP and demo's good return technique. (not met, progressing)  3. Pt to improve trunk and LE strength to 5/5 to allow for increased ease with stair climbing. (not met, progressing)  4. Pt to improve functional mobility with FOTO limitation <=40% disability. (not met, progressing)    Plan     Continue with POC for global strength and stability.  Plan of care Certification: 1/8/2020 to 04/08/2020.--- PN due by 02/08/2020    Marjorie Martines, PT

## 2020-02-17 NOTE — PROGRESS NOTES
Dry Needling Daily Note     Patient ID: Dasia Henning is a 64 y.o. female.  Diagnosis:   1. Chronic neck pain     2. Chronic bilateral low back pain without sciatica     3. Decreased strength, endurance, and mobility       Date:  02/17/2020    Start Time:  8:10  Stop Time:  8:30    Subjective:     Pt reports: sharp increase in R>L low back pain over the weekend after prolonged standing at event.  Pain Scale: Dasia rates pain on a scale of 0-10 to be 7 currently.    Objective:     Pt signed written consent to dry needling Rx.  Pt gave verbal consent for DN.   Pt rec'd dry needling to R low back, hip with 3 in needles with no adverse effects.    Homeostatic points:  1. Deep Radial  2. Greater Auricular  3. Spinal Accessory  4. Saphenous  5. Deep Fibular  6. Tibial  7. Greater Occipital  8. Suprascapular ( infraspinatus)  9. Lateral Antebrachial Cutaneous  10. Sural  11. Lateral Popliteal  12. Superficial Radial  13. Dorsal Scapular  14. Superior Cluneal  15. Posterior Cutaneous L 2  16. Inferior Gluteal  17. Lateral Pectoral  18. Ilitotibal  19. Infraorbital  20. Spinous process T7  21. Posterior cutaneous  T6  22. Posterior cutaneous L 5  23. Supraorbital  24. Common fibular    Paravertebral Points:  L3-5    Symptomatic Points:   R QL    Assessment:     Patient demonstrated appropriate response to FDN. Intramuscular estim used today on setting 8 for 80 Hz for pain reduction and improved mobility. Good tolerance with DN today with improved bed mobility and rolling afterwards.      Patient Education/Response:     Education provided re:   Purpose, benefits, and potential side effects of dry needling.   Educated pt to use heat following treatment sessions to reduce c/o pain or soreness and to improve circulation to needled sites.   Encouraged pt to continue with HEP to maintain flexibility, ROM, and functional mobility.  Dasia verbalized good understanding of education     Plans and Goals:     Monitor response to FDN.  Continue with FDN in POC as tolerated.     Marjorie Martines, PT  2/17/2020

## 2020-02-20 ENCOUNTER — CLINICAL SUPPORT (OUTPATIENT)
Dept: REHABILITATION | Facility: OTHER | Age: 65
End: 2020-02-20
Payer: COMMERCIAL

## 2020-02-20 DIAGNOSIS — Z74.09 DECREASED STRENGTH, ENDURANCE, AND MOBILITY: ICD-10-CM

## 2020-02-20 DIAGNOSIS — M54.50 CHRONIC BILATERAL LOW BACK PAIN WITHOUT SCIATICA: ICD-10-CM

## 2020-02-20 DIAGNOSIS — R68.89 DECREASED STRENGTH, ENDURANCE, AND MOBILITY: ICD-10-CM

## 2020-02-20 DIAGNOSIS — G89.29 CHRONIC BILATERAL LOW BACK PAIN WITHOUT SCIATICA: ICD-10-CM

## 2020-02-20 DIAGNOSIS — G89.29 CHRONIC NECK PAIN: ICD-10-CM

## 2020-02-20 DIAGNOSIS — R53.1 DECREASED STRENGTH, ENDURANCE, AND MOBILITY: ICD-10-CM

## 2020-02-20 DIAGNOSIS — M54.2 CHRONIC NECK PAIN: ICD-10-CM

## 2020-02-20 PROCEDURE — 97110 THERAPEUTIC EXERCISES: CPT | Mod: PN

## 2020-02-20 PROCEDURE — 97140 MANUAL THERAPY 1/> REGIONS: CPT | Mod: PN

## 2020-02-20 NOTE — PROGRESS NOTES
Dry Needling Daily Note     Patient ID: Dasia Henning is a 64 y.o. female.  Diagnosis:   1. Chronic neck pain     2. Chronic bilateral low back pain without sciatica     3. Decreased strength, endurance, and mobility       Date:  02/20/2020    Start Time:  12:00  Stop Time:  12:32    Subjective:     Pt reports: more soreness in R glute than R low back currently.  Pain Scale: Dasia rates pain on a scale of 0-10 to be 3 currently.    Objective:     Pt signed written consent to dry needling Rx.  Pt gave verbal consent for DN.   Pt rec'd dry needling to R low back, hip with 3 in needles with no adverse effects.    Homeostatic points:  1. Deep Radial  2. Greater Auricular  3. Spinal Accessory  4. Saphenous  5. Deep Fibular  6. Tibial  7. Greater Occipital  8. Suprascapular ( infraspinatus)  9. Lateral Antebrachial Cutaneous  10. Sural  11. Lateral Popliteal  12. Superficial Radial  13. Dorsal Scapular  14. Superior Cluneal  15. Posterior Cutaneous L 2  16. Inferior Gluteal  17. Lateral Pectoral  18. Ilitotibal  19. Infraorbital  20. Spinous process T7  21. Posterior cutaneous  T6  22. Posterior cutaneous L 5  23. Supraorbital  24. Common fibular    Paravertebral Points:  L3-5    Symptomatic Points:   R QL    Assessment:     Patient demonstrated appropriate response to FDN. Intramuscular estim used today on setting 8 for 80 Hz for pain reduction and improved mobility to the R lumbar paraspinals and R glute today. Good tolerance with DN today with improved bed mobility and rolling afterwards.      Patient Education/Response:     Education provided re:   Purpose, benefits, and potential side effects of dry needling.   Educated pt to use heat following treatment sessions to reduce c/o pain or soreness and to improve circulation to needled sites.   Encouraged pt to continue with HEP to maintain flexibility, ROM, and functional mobility.  Dasia verbalized good understanding of education     Plans and Goals:     Monitor  response to FDN. Continue with FDN in POC as tolerated.     Marjorie Martines, PT  2/20/2020

## 2020-02-20 NOTE — PROGRESS NOTES
"  Physical Therapy Daily Treatment Note     Name: Dasia Henning  Clinic Number: [id number]    Therapy Diagnosis:   Encounter Diagnoses   Name Primary?    Chronic neck pain     Chronic bilateral low back pain without sciatica     Decreased strength, endurance, and mobility      Physician: Mandie Gray PA-C    Visit Date: 2/20/2020    Physician Orders: PT Eval and Treat   Medical Diagnosis from Referral:   M43.16 (ICD-10-CM) - Spondylolisthesis of lumbar region   M51.36 (ICD-10-CM) - DDD (degenerative disc disease), lumbar   M50.30 (ICD-10-CM) - DDD (degenerative disc disease), cervical         Evaluation Date: 1/8/2020  Authorization Period Expiration: 12/15/2020  Plan of Care Expiration: 04/08/2020  Visit # / Visits authorized: 9/ 19 + 1/1 (eval)     Time In:  12:00 pm  Time Out: 12:50 Pm  Total Billable Time: 40 minutes     Precautions: Standard, Hx L tibial spiral Fx with closed reduction (18 years ago), Hx L knee OA with meniscal tear    Subjective     Pt reports: marked relief following the dry needling from last session. She would like to try the needling again today. Will be going OOT x 1 week ([place]). C/c today is mainly stiffness > pain.  She was compliant with home exercise program.  Response to previous treatment: good  Functional change: none    Pain: 3/10  Location: right low back (indicates R SIJ and across LB along beltlne), bilateral neck (indicates upper traps)       Objective     Dasia received therapeutic exercises to develop strength, endurance, ROM, flexibility, posture and core stabilization for 8 minutes including:    **Exercises in BOLD performed today**    Self MET for R anterior innominate rotation w/ dowel 6 x 10"  blake pose stretch 3 x 20"  SKTC - demo'd for HEP  DKTC - demo'd for HEP  PPT 20x  PPT with marches 30x  PPT with Heel slides 30x  Figure 4 piriformis stretch 3 x 30" - VC for technique  supine hl hip abd 2 x 15 x GTB  supine BKFO 2 x 15 x GTB  HL hip add 2 min x 5" " "holds  SL hip abd: 2 x 15  SL [place] 2 x 15  Prone glute ets 10 x 10"  Butt winks (prone) x 10 x 10"  prone hip ext 2 x 15 w/ glute set  prone donkey kicks 2 x 15 w/ glute set      rows: 2 x 15 x BTB  SALPD: 2 x 15 x GTB  anti rotations: 2 x 10 x OTB  side stepping: 4 x 40 ft x GTB at knees  Monster walks:  4 x 40 ft x GTB at knees   +shuttle DL 3 x 10 x 3 black cords - OTB around knees - change to GTB next visit  +shuttle SL 3 x 10 x 1 black cord - beronica  +step ups 1 x 15 x 6"    Dasia received the following manual therapy techniques:   00 min x MET for R anterior innominate rotation  00 min x preparation and application of kinesiotape w/ star technique over R sacral sulcus for SIJ stability and pain reduction. Patient received education regarding appropriate care and removal of Kinesiotape. Patient instructed in proper removal techniques if skin irritation occurs.   32 min x dry needling - see note by Marjorie Price, PT    Modalities:   10 min x MHP to low back at end of session  00 min x ice to low back - beginning of session today    Home Exercises Provided and Patient Education Provided     Education provided:   - 2/11/2020 - updated HEP: prone hip ext, prone donkey kicks, BKFO, HL hip abd, narrow rows, SALPD - green TB given today    Written Home Exercises Provided: Patient instructed to cont prior HEP.  Exercises were reviewed and Dasia was able to demonstrate them prior to the end of the session.  Dasia demonstrated good  understanding of the education provided.     See EMR under Patient Instructions for exercises provided prior visit.    Assessment     Good tolerance with dry needling with appropriate training effect. Reviewed HEP for when pt is OOT.  Dasia is progressing well towards her goals.   Pt prognosis is Good.     Pt will continue to benefit from skilled outpatient physical therapy to address the deficits listed in the problem list box on initial evaluation, provide pt/family education and to " maximize pt's level of independence in the home and community environment.     Pt's spiritual, cultural and educational needs considered and pt agreeable to plan of care and goals.     Anticipated barriers to physical therapy: standard, occupation    Goals:   Short Term Goals: 6 weeks   1. Pt to improve scapular strength by a half grade to assist with dynamic cervico-thoracic alignment and stability. (not met, progressing)  2. Pt to improve pelvic alignment to be able to perform bed mobility without pain or difficulty. (not met, progressing)  3. Pt to improve trunk and LE strength by a half grade to allow for increased ease with stair climbing. (not met, progressing)  4. Pt to improve functional mobility with FOTO limitation <=50% disability. (not met, progressing)     Long Term Goals: 12 weeks   1. Pt to improve scapular strength to >=4+/5 to assist with dynamic cervico-thoracic alignment and stability. (not met, progressing)  2. Pt independent with HEP and demo's good return technique. (not met, progressing)  3. Pt to improve trunk and LE strength to 5/5 to allow for increased ease with stair climbing. (not met, progressing)  4. Pt to improve functional mobility with FOTO limitation <=40% disability. (not met, progressing)    Plan     Continue with POC for global strength and stability.  Plan of care Certification: 1/8/2020 to 04/08/2020.--- PN due by 02/08/2020    Marjorie Martines, PT

## 2020-03-05 ENCOUNTER — CLINICAL SUPPORT (OUTPATIENT)
Dept: REHABILITATION | Facility: OTHER | Age: 65
End: 2020-03-05
Payer: COMMERCIAL

## 2020-03-05 DIAGNOSIS — R68.89 DECREASED STRENGTH, ENDURANCE, AND MOBILITY: ICD-10-CM

## 2020-03-05 DIAGNOSIS — R53.1 DECREASED STRENGTH, ENDURANCE, AND MOBILITY: ICD-10-CM

## 2020-03-05 DIAGNOSIS — G89.29 CHRONIC NECK PAIN: ICD-10-CM

## 2020-03-05 DIAGNOSIS — M54.2 CHRONIC NECK PAIN: ICD-10-CM

## 2020-03-05 DIAGNOSIS — G89.29 CHRONIC BILATERAL LOW BACK PAIN WITHOUT SCIATICA: ICD-10-CM

## 2020-03-05 DIAGNOSIS — Z74.09 DECREASED STRENGTH, ENDURANCE, AND MOBILITY: ICD-10-CM

## 2020-03-05 DIAGNOSIS — M54.50 CHRONIC BILATERAL LOW BACK PAIN WITHOUT SCIATICA: ICD-10-CM

## 2020-03-05 PROCEDURE — 97140 MANUAL THERAPY 1/> REGIONS: CPT | Mod: PN

## 2020-03-05 PROCEDURE — 97110 THERAPEUTIC EXERCISES: CPT | Mod: PN

## 2020-03-09 NOTE — PROGRESS NOTES
"  Physical Therapy Daily Treatment Note     Name: Dasia Henning  Clinic Number: [id number]    Therapy Diagnosis:   Encounter Diagnoses   Name Primary?    Chronic neck pain     Chronic bilateral low back pain without sciatica     Decreased strength, endurance, and mobility      Physician: Mandie Gray PA-C    Visit Date: 3/5/2020    Physician Orders: PT Eval and Treat   Medical Diagnosis from Referral:   M43.16 (ICD-10-CM) - Spondylolisthesis of lumbar region   M51.36 (ICD-10-CM) - DDD (degenerative disc disease), lumbar   M50.30 (ICD-10-CM) - DDD (degenerative disc disease), cervical         Evaluation Date: 1/8/2020  Authorization Period Expiration: 12/15/2020  Plan of Care Expiration: 04/08/2020  Visit # / Visits authorized: 10/ 19 + 1/1 (eval)     Time In:  5:00 pm  Time Out: 6:00 Pm  Total Billable Time: 60 minutes -- PN next visit     Precautions: Standard, Hx L tibial spiral Fx with closed reduction (18 years ago), Hx L knee OA with meniscal tear    Subjective     Pt reports: that she has been OOT x 2 weeks on vacation. Says that she was able to walk up to 4 miles every day that she was OOT with fatigue but not worsening pain. Says that she has mild pain over R SIJ today from sleeping wrong in bed. Has determined that firm mattresses make her back hurt worse.     She was compliant with home exercise program.  Response to previous treatment: good  Functional change: none    Pain: 3/10  Location: right low back (indicates R SIJ and across LB along beltlne), bilateral neck (indicates upper traps)       Objective     Dasia received therapeutic exercises to develop strength, endurance, ROM, flexibility, posture and core stabilization for 45 minutes including:    **Exercises in BOLD performed today**    Self MET for R anterior innominate rotation w/ dowel 6 x 10"  blake pose stretch 3 x 20"  SKTC - demo'd for HEP  DKTC - demo'd for HEP  PPT 20x  PPT with marches 30x  PPT with Heel slides 30x  Figure 4 " "piriformis stretch 3 x 30" - VC for technique  supine hl hip abd 2 x 15 x GTB  supine BKFO 2 x 15 x GTB  HL hip add 2 min x 5" holds  SL hip abd: 2 x 15  SL Bradly 2 x 15  Prone glute ets 10 x 10" - Np  Butt winks (prone) x 10 x 10" - np  prone hip ext 2 x 15 w/ glute set - np  prone donkey kicks 2 x 15 w/ glute set - np      rows: 2 x 15 x BTB - resume next visit  SALPD: 2 x 15 x GTB - resume next visit  anti rotations: 2 x 10 x OTB - resume next visit  side stepping: 4 x 40 ft x GTB at knees  Monster walks:  4 x 40 ft x GTB at knees   shuttle DL 3 x 10 x 3 black cords - OTB around knees - change to GTB next visit  shuttle SL 3 x 10 x 1 black cord - beronica  step ups 1 x 15 x 6"    Dasia received the following manual therapy techniques:   00 min x MET for R anterior innominate rotation  00 min x preparation and application of kinesiotape w/ star technique over R sacral sulcus for SIJ stability and pain reduction. Patient received education regarding appropriate care and removal of Kinesiotape. Patient instructed in proper removal techniques if skin irritation occurs.   15 min x dry needling - see note by Marjorie Price, PT    Modalities:   10 min x MHP to low back at end of session  00 min x ice to low back - beginning of session today    Home Exercises Provided and Patient Education Provided     Education provided:   - 2/11/2020 - updated HEP: prone hip ext, prone donkey kicks, BKFO, HL hip abd, narrow rows, SALPD - green TB given today    Written Home Exercises Provided: Patient instructed to cont prior HEP.  Exercises were reviewed and Dasia was able to demonstrate them prior to the end of the session.  Dasia demonstrated good  understanding of the education provided.     See EMR under Patient Instructions for exercises provided prior visit.    Assessment     Able to resume most of her previous therex program, without pain or difficulty. Resumed dry needling today to promote mobility and pain reduction. Plan " to progress strength and stability program next visit.   Dasia is progressing well towards her goals.   Pt prognosis is Good.     Pt will continue to benefit from skilled outpatient physical therapy to address the deficits listed in the problem list box on initial evaluation, provide pt/family education and to maximize pt's level of independence in the home and community environment.     Pt's spiritual, cultural and educational needs considered and pt agreeable to plan of care and goals.     Anticipated barriers to physical therapy: standard, occupation    Goals:   Short Term Goals: 6 weeks   1. Pt to improve scapular strength by a half grade to assist with dynamic cervico-thoracic alignment and stability. (not met, progressing)  2. Pt to improve pelvic alignment to be able to perform bed mobility without pain or difficulty. (not met, progressing)  3. Pt to improve trunk and LE strength by a half grade to allow for increased ease with stair climbing. (not met, progressing)  4. Pt to improve functional mobility with FOTO limitation <=50% disability. (not met, progressing)     Long Term Goals: 12 weeks   1. Pt to improve scapular strength to >=4+/5 to assist with dynamic cervico-thoracic alignment and stability. (not met, progressing)  2. Pt independent with HEP and demo's good return technique. (not met, progressing)  3. Pt to improve trunk and LE strength to 5/5 to allow for increased ease with stair climbing. (not met, progressing)  4. Pt to improve functional mobility with FOTO limitation <=40% disability. (not met, progressing)    Plan     Continue with POC for global strength and stability.  Plan of care Certification: 1/8/2020 to 04/08/2020.--- PN due by 02/08/2020    Marjorie Martines, PT

## 2020-03-09 NOTE — PROGRESS NOTES
Dry Needling Daily Note     Patient ID: Dasia Henning is a 64 y.o. female.  Diagnosis:   1. Chronic neck pain     2. Chronic bilateral low back pain without sciatica     3. Decreased strength, endurance, and mobility       Date:  03/09/2020    Start Time:  5:45 p  Stop Time:  6:00 p    Subjective:     Pt reports: continues pain over R SIJ today after sleeping on firm mattress.  Pain Scale: Dasia rates pain on a scale of 0-10 to be 3 currently.    Objective:     Pt signed written consent to dry needling Rx.  Pt gave verbal consent for DN.   Pt rec'd dry needling to R low back, hip with 3 in needles with no adverse effects.    Homeostatic points:  1. Deep Radial  2. Greater Auricular  3. Spinal Accessory  4. Saphenous  5. Deep Fibular  6. Tibial  7. Greater Occipital  8. Suprascapular ( infraspinatus)  9. Lateral Antebrachial Cutaneous  10. Sural  11. Lateral Popliteal  12. Superficial Radial  13. Dorsal Scapular  14. Superior Cluneal  15. Posterior Cutaneous L 2  16. Inferior Gluteal  17. Lateral Pectoral  18. Ilitotibal  19. Infraorbital  20. Spinous process T7  21. Posterior cutaneous  T6  22. Posterior cutaneous L 5  23. Supraorbital  24. Common fibular    Paravertebral Points:  L3-5    Symptomatic Points:   R QL    Assessment:     Patient demonstrated appropriate response to FDN. Intramuscular estim used today on setting 8 for 80 Hz for pain reduction and improved mobility to the R lumbar paraspinals and R glute today. Good tolerance with DN today with improved bed mobility and rolling afterwards.      Patient Education/Response:     Education provided re:   Purpose, benefits, and potential side effects of dry needling.   Educated pt to use heat following treatment sessions to reduce c/o pain or soreness and to improve circulation to needled sites.   Encouraged pt to continue with HEP to maintain flexibility, ROM, and functional mobility.  Dasia verbalized good understanding of education     Plans and Goals:      Monitor response to FDN. Continue with FDN in POC as tolerated.     Marjorie Martines, PT  3/9/2020

## 2020-03-11 ENCOUNTER — CLINICAL SUPPORT (OUTPATIENT)
Dept: REHABILITATION | Facility: OTHER | Age: 65
End: 2020-03-11
Payer: COMMERCIAL

## 2020-03-11 DIAGNOSIS — R68.89 DECREASED STRENGTH, ENDURANCE, AND MOBILITY: ICD-10-CM

## 2020-03-11 DIAGNOSIS — Z74.09 DECREASED STRENGTH, ENDURANCE, AND MOBILITY: ICD-10-CM

## 2020-03-11 DIAGNOSIS — G89.29 CHRONIC NECK PAIN: ICD-10-CM

## 2020-03-11 DIAGNOSIS — G89.29 CHRONIC BILATERAL LOW BACK PAIN WITHOUT SCIATICA: ICD-10-CM

## 2020-03-11 DIAGNOSIS — R53.1 DECREASED STRENGTH, ENDURANCE, AND MOBILITY: ICD-10-CM

## 2020-03-11 DIAGNOSIS — M54.2 CHRONIC NECK PAIN: ICD-10-CM

## 2020-03-11 DIAGNOSIS — M54.50 CHRONIC BILATERAL LOW BACK PAIN WITHOUT SCIATICA: ICD-10-CM

## 2020-03-11 PROCEDURE — 97110 THERAPEUTIC EXERCISES: CPT | Mod: PN

## 2020-03-11 PROCEDURE — 97140 MANUAL THERAPY 1/> REGIONS: CPT | Mod: PN

## 2020-03-11 NOTE — PROGRESS NOTES
"  Physical Therapy Daily Treatment Note     Name: Dasia Henning  Clinic Number: [id number]    Therapy Diagnosis:   Encounter Diagnoses   Name Primary?    Chronic neck pain     Chronic bilateral low back pain without sciatica     Decreased strength, endurance, and mobility      Physician: Mandie Gray PA-C    Visit Date: 3/11/2020    Physician Orders: PT Eval and Treat   Medical Diagnosis from Referral:   M43.16 (ICD-10-CM) - Spondylolisthesis of lumbar region   M51.36 (ICD-10-CM) - DDD (degenerative disc disease), lumbar   M50.30 (ICD-10-CM) - DDD (degenerative disc disease), cervical         Evaluation Date: 1/8/2020  Authorization Period Expiration: 12/15/2020  Plan of Care Expiration: 04/08/2020  Visit # / Visits authorized: 11/ 19 + 1/1 (eval)     Time In:  7:00 am  Time Out: 8:00 am  Total Billable Time: 60 minutes -- PN today     Precautions: Standard, Hx L tibial spiral Fx with closed reduction (18 years ago), Hx L knee OA with meniscal tear    Subjective     Pt reports: that she must've slept wrong as she woke up this morning with her hip feeling out of place. She was able to use her HEP to put it back but she continues to have soreness in the low back.     She was compliant with home exercise program.  Response to previous treatment: good  Functional change: none    Pain: 3/10  Location: right low back (indicates R SIJ and across LB along beltlne), bilateral neck (indicates upper traps)       Objective     Updated 3/11/2020     Upper Quarter Screen:     Scapular strength:  MT = fair-  LT = fair-  SA = fair  Rhomboids = fair-     Flexibility:   Pectoralis Major / Minor: fair  Upper Traps: fair+  Levator Scap: fair+     Joint Mobility: TSP = hypo     Lower Quarter Screen:     Thoracic/Lumbar AROM: Pain/Dysfunction with Movement:   Flexion WNL, tingertips to toes, pain in R SIJ   Extension Min lorenzo, pain over R SIJ and along interspinous space of L5-S1   Right side bending Mod-Min lorenzo, fingertips 1.5" to " "knee joint line, c/o pain in R SIJ   Left side bending Mod-min lorenzo, fingertips 1.5" to knee joint line   Right rotation WFL   Left rotation WFL      Lower Extremity Strength  Right LE   Left LE     Hip flexion: 4/5 Hip flexion: 5/5   Hip extension:  4/5 Hip extension: 4+/5   Hip abduction: 4/5 Hip abduction: 4+/5   Hip adduction:  5/5 Hip adduction:  5/5   Hip Internal rotation    4-/5 Hip Internal rotation 4+/5      Flexibility:   Mynor test              Hip flexors: good              Quads: good     Special Tests:   Test Name  Test Result   MARCELO (--)   FADIR (--)   SI Joint Provocation Test (compression / distraction) (--)         CMS Impairment/Limitation/Restriction for FOTO lumbar spine Survey     Therapist reviewed FOTO scores for Dasia Henning on 3/11/2020.   FOTO documents entered into [employer] - see Media section.     Limitation Score: 48%  Category: Mobility     Current : CL = least 60% but < 80% impaired, limited or restricted  Goal: CJ = at least 20% but < 40% impaired, limited or restricted             Dasia received therapeutic exercises to develop strength, endurance, ROM, flexibility, posture and core stabilization for 50 minutes including:    **Exercises in BOLD performed today**    Self MET for R anterior innominate rotation w/ dowel 6 x 10"  blake pose stretch 3 x 20"  SKTC - demo'd for HEP  DKTC - demo'd for HEP  PPT 20x  PPT with marches 30x  PPT with Heel slides 20x  +iso pull down with SLR 2 x 10 w/ pink sports cord  Figure 4 piriformis stretch 3 x 30" - VC for technique  supine hl hip abd 2 x 15 x GTB  supine BKFO 2 x 15 x GTB  HL hip add 2 min x 5" holds  SL hip abd: 2 x 15  SL [place] 2 x 15  Prone glute ets 10 x 10" - Np  Butt winks (prone) x 10 x 10" - np  prone hip ext 2 x 15 w/ glute set - np  prone donkey kicks 2 x 15 w/ glute set - np      rows: 2 x 10 x BTB  SALPD: 2 x 10 x GTB  anti rotations: 1 x 10 x 3" hold x OTB  side stepping: 4 x 40 ft x GTB at knees  Monster walks:  4 x 40 " "ft x GTB at knees   shuttle DL 3 x 10 x 3 black cords - OTB around knees - change to GTB next visit  shuttle SL 3 x 10 x 1 black cord - beronica  step ups 1 x 15 x 6"    Consider adding next visit: PB sitting chops / lifts / Ys and Ts, shuttle dbl arm pull down in 90-90, Qped birddogs    Dasia received the following manual therapy techniques:   00 min x MET for R anterior innominate rotation  00 min x preparation and application of kinesiotape w/ star technique over R sacral sulcus for SIJ stability and pain reduction. Patient received education regarding appropriate care and removal of Kinesiotape. Patient instructed in proper removal techniques if skin irritation occurs.   10 min x dry needling - see note by Marjorie Price, PT    Modalities:   10 min x MHP to low back at end of session  00 min x ice to low back - beginning of session today    Home Exercises Provided and Patient Education Provided     Education provided:   - 2/11/2020 - updated HEP: prone hip ext, prone donkey kicks, BKFO, HL hip abd, narrow rows, SALPD - green TB given today    Written Home Exercises Provided: Patient instructed to cont prior HEP.  Exercises were reviewed and Dasia was able to demonstrate them prior to the end of the session.  Dasia demonstrated good  understanding of the education provided.     See EMR under Patient Instructions for exercises provided prior visit.    Assessment     Fair tolerance with therex today with frequent rest breaks required due to fatigue and c/o intermittent tightness in the low back. Pt presents with slow improvements in hip and trunk ROM, which remain limited due to muscle tightness and motor control/coordionation deficits. Pt also presents with continued limitations in glute and trunk strength that limit dynamic stability with all functional activities. Plan to continue progression of trunk and extremity strength and endurance    Dasia is progressing well towards her goals.   Pt prognosis is Good.     Pt " will continue to benefit from skilled outpatient physical therapy to address the deficits listed in the problem list box on initial evaluation, provide pt/family education and to maximize pt's level of independence in the home and community environment.     Pt's spiritual, cultural and educational needs considered and pt agreeable to plan of care and goals.     Anticipated barriers to physical therapy: standard, occupation    Goals:   Short Term Goals: 6 weeks   1. Pt to improve scapular strength by a half grade to assist with dynamic cervico-thoracic alignment and stability. (not met, progressing)  2. Pt to improve pelvic alignment to be able to perform bed mobility without pain or difficulty. (not met, progressing)  3. Pt to improve trunk and LE strength by a half grade to allow for increased ease with stair climbing. (not met, progressing)  4. Pt to improve functional mobility with FOTO limitation <=50% disability. (not met, progressing)     Long Term Goals: 12 weeks   1. Pt to improve scapular strength to >=4+/5 to assist with dynamic cervico-thoracic alignment and stability. (not met, progressing)  2. Pt independent with HEP and demo's good return technique. (not met, progressing)  3. Pt to improve trunk and LE strength to 5/5 to allow for increased ease with stair climbing. (not met, progressing)  4. Pt to improve functional mobility with FOTO limitation <=40% disability. (not met, progressing)    Plan     Continue with POC for global strength and stability.  Plan of care Certification: 1/8/2020 to 04/08/2020.--- PN due by 04/08/2020    Marjorie Martines, PT

## 2020-03-15 RX ORDER — METHOCARBAMOL 750 MG/1
750 TABLET, FILM COATED ORAL 3 TIMES DAILY PRN
Qty: 60 TABLET | Refills: 0 | OUTPATIENT
Start: 2020-03-15

## 2020-03-15 RX ORDER — MELOXICAM 15 MG/1
TABLET ORAL
Qty: 30 TABLET | Refills: 0 | OUTPATIENT
Start: 2020-03-15

## 2020-03-24 ENCOUNTER — DOCUMENTATION ONLY (OUTPATIENT)
Dept: REHABILITATION | Facility: OTHER | Age: 65
End: 2020-03-24

## 2020-03-24 DIAGNOSIS — Z74.09 DECREASED STRENGTH, ENDURANCE, AND MOBILITY: ICD-10-CM

## 2020-03-24 DIAGNOSIS — R68.89 DECREASED STRENGTH, ENDURANCE, AND MOBILITY: ICD-10-CM

## 2020-03-24 DIAGNOSIS — R53.1 DECREASED STRENGTH, ENDURANCE, AND MOBILITY: ICD-10-CM

## 2020-03-24 DIAGNOSIS — G89.29 CHRONIC NECK PAIN: ICD-10-CM

## 2020-03-24 DIAGNOSIS — M54.50 CHRONIC BILATERAL LOW BACK PAIN WITHOUT SCIATICA: ICD-10-CM

## 2020-03-24 DIAGNOSIS — M54.2 CHRONIC NECK PAIN: ICD-10-CM

## 2020-03-24 DIAGNOSIS — G89.29 CHRONIC BILATERAL LOW BACK PAIN WITHOUT SCIATICA: ICD-10-CM

## 2020-03-24 NOTE — PROGRESS NOTES
Outpatient Therapy Discharge Summary     Name: Dasia Henning  [facility] Number: [id number]    Therapy Diagnosis:   Encounter Diagnoses   Name Primary?    Chronic neck pain     Chronic bilateral low back pain without sciatica     Decreased strength, endurance, and mobility      Physician: No ref. provider found    Physician Orders: PT Eval and Treat   Medical Diagnosis from Referral:   M43.16 (ICD-10-CM) - Spondylolisthesis of lumbar region   M51.36 (ICD-10-CM) - DDD (degenerative disc disease), lumbar   M50.30 (ICD-10-CM) - DDD (degenerative disc disease), cervical         Evaluation Date: 1/8/2020      Date of Last visit: 3/11/2020  Total Visits Received: 12    Assessment    Goals: 0/8 goals met  Short Term Goals: 6 weeks   1. Pt to improve scapular strength by a half grade to assist with dynamic cervico-thoracic alignment and stability.  2. Pt to improve pelvic alignment to be able to perform bed mobility without pain or difficulty.  3. Pt to improve trunk and LE strength by a half grade to allow for increased ease with stair climbing.  4. Pt to improve functional mobility with FOTO limitation <=50% disability.     Long Term Goals: 12 weeks   1. Pt to improve scapular strength to >=4+/5 to assist with dynamic cervico-thoracic alignment and stability.  2. Pt independent with HEP and demo's good return technique.  3. Pt to improve trunk and LE strength to 5/5 to allow for increased ease with stair climbing.  4. Pt to improve functional mobility with FOTO limitation <=40% disability.    Discharge reason: Patient has not attended therapy since 3/11/2020    Plan   This patient is discharged from Physical Therapy

## 2021-01-07 ENCOUNTER — TELEPHONE (OUTPATIENT)
Dept: OBSTETRICS AND GYNECOLOGY | Facility: CLINIC | Age: 66
End: 2021-01-07

## 2021-03-10 ENCOUNTER — OFFICE VISIT (OUTPATIENT)
Dept: OBSTETRICS AND GYNECOLOGY | Facility: CLINIC | Age: 66
End: 2021-03-10
Attending: OBSTETRICS & GYNECOLOGY
Payer: COMMERCIAL

## 2021-03-10 VITALS
SYSTOLIC BLOOD PRESSURE: 106 MMHG | DIASTOLIC BLOOD PRESSURE: 70 MMHG | HEIGHT: 67 IN | WEIGHT: 191.38 LBS | BODY MASS INDEX: 30.04 KG/M2

## 2021-03-10 DIAGNOSIS — N95.2 VAGINAL ATROPHY: Primary | ICD-10-CM

## 2021-03-10 DIAGNOSIS — N94.19 DYSPAREUNIA DUE TO MEDICAL CONDITION IN FEMALE: ICD-10-CM

## 2021-03-10 DIAGNOSIS — R53.83 FATIGUE, UNSPECIFIED TYPE: ICD-10-CM

## 2021-03-10 PROCEDURE — 99204 PR OFFICE/OUTPT VISIT, NEW, LEVL IV, 45-59 MIN: ICD-10-PCS | Mod: S$GLB,,, | Performed by: OBSTETRICS & GYNECOLOGY

## 2021-03-10 PROCEDURE — 1126F PR PAIN SEVERITY QUANTIFIED, NO PAIN PRESENT: ICD-10-PCS | Mod: S$GLB,,, | Performed by: OBSTETRICS & GYNECOLOGY

## 2021-03-10 PROCEDURE — 3288F PR FALLS RISK ASSESSMENT DOCUMENTED: ICD-10-PCS | Mod: CPTII,S$GLB,, | Performed by: OBSTETRICS & GYNECOLOGY

## 2021-03-10 PROCEDURE — 3008F PR BODY MASS INDEX (BMI) DOCUMENTED: ICD-10-PCS | Mod: CPTII,S$GLB,, | Performed by: OBSTETRICS & GYNECOLOGY

## 2021-03-10 PROCEDURE — 1126F AMNT PAIN NOTED NONE PRSNT: CPT | Mod: S$GLB,,, | Performed by: OBSTETRICS & GYNECOLOGY

## 2021-03-10 PROCEDURE — 1101F PR PT FALLS ASSESS DOC 0-1 FALLS W/OUT INJ PAST YR: ICD-10-PCS | Mod: CPTII,S$GLB,, | Performed by: OBSTETRICS & GYNECOLOGY

## 2021-03-10 PROCEDURE — 3008F BODY MASS INDEX DOCD: CPT | Mod: CPTII,S$GLB,, | Performed by: OBSTETRICS & GYNECOLOGY

## 2021-03-10 PROCEDURE — 1101F PT FALLS ASSESS-DOCD LE1/YR: CPT | Mod: CPTII,S$GLB,, | Performed by: OBSTETRICS & GYNECOLOGY

## 2021-03-10 PROCEDURE — 3288F FALL RISK ASSESSMENT DOCD: CPT | Mod: CPTII,S$GLB,, | Performed by: OBSTETRICS & GYNECOLOGY

## 2021-03-10 PROCEDURE — 99204 OFFICE O/P NEW MOD 45 MIN: CPT | Mod: S$GLB,,, | Performed by: OBSTETRICS & GYNECOLOGY

## 2021-03-10 RX ORDER — FLUOXETINE HYDROCHLORIDE 20 MG/1
20 CAPSULE ORAL EVERY MORNING
COMMUNITY
Start: 2021-01-07

## 2021-03-10 RX ORDER — ESTRADIOL 10 UG/1
1 INSERT VAGINAL NIGHTLY
Qty: 18 EACH | Refills: 0 | Status: SHIPPED | OUTPATIENT
Start: 2021-03-10 | End: 2021-09-23 | Stop reason: SDUPTHER

## 2021-03-10 RX ORDER — ESTRADIOL 10 UG/1
1 INSERT VAGINAL
Qty: 8 EACH | Refills: 11 | Status: SHIPPED | OUTPATIENT
Start: 2021-03-11 | End: 2021-09-23 | Stop reason: SDUPTHER

## 2021-03-29 ENCOUNTER — CLINICAL SUPPORT (OUTPATIENT)
Dept: REHABILITATION | Facility: HOSPITAL | Age: 66
End: 2021-03-29
Attending: OBSTETRICS & GYNECOLOGY
Payer: COMMERCIAL

## 2021-03-29 DIAGNOSIS — M62.81 DECREASED MUSCLE STRENGTH: ICD-10-CM

## 2021-03-29 DIAGNOSIS — N94.19 DYSPAREUNIA DUE TO MEDICAL CONDITION IN FEMALE: ICD-10-CM

## 2021-03-29 DIAGNOSIS — R10.2 PELVIC PAIN IN FEMALE: ICD-10-CM

## 2021-03-29 PROCEDURE — 97112 NEUROMUSCULAR REEDUCATION: CPT | Mod: PO

## 2021-03-29 PROCEDURE — 97161 PT EVAL LOW COMPLEX 20 MIN: CPT | Mod: PO

## 2021-04-05 ENCOUNTER — TELEPHONE (OUTPATIENT)
Dept: OBSTETRICS AND GYNECOLOGY | Facility: CLINIC | Age: 66
End: 2021-04-05

## 2021-04-14 ENCOUNTER — HOSPITAL ENCOUNTER (OUTPATIENT)
Dept: RADIOLOGY | Facility: HOSPITAL | Age: 66
Discharge: HOME | End: 2021-04-14
Attending: PHYSICIAN ASSISTANT
Payer: COMMERCIAL

## 2021-04-14 ENCOUNTER — OFFICE VISIT (OUTPATIENT)
Dept: ORTHOPEDICS | Facility: CLINIC | Age: 66
End: 2021-04-14
Payer: COMMERCIAL

## 2021-04-14 VITALS — WEIGHT: 191 LBS | BODY MASS INDEX: 29.98 KG/M2 | HEIGHT: 67 IN

## 2021-04-14 DIAGNOSIS — M43.16 SPONDYLOLISTHESIS OF LUMBAR REGION: ICD-10-CM

## 2021-04-14 DIAGNOSIS — M25.572 ACUTE LEFT ANKLE PAIN: Primary | ICD-10-CM

## 2021-04-14 DIAGNOSIS — M25.572 ACUTE LEFT ANKLE PAIN: ICD-10-CM

## 2021-04-14 PROCEDURE — 1125F PR PAIN SEVERITY QUANTIFIED, PAIN PRESENT: ICD-10-PCS | Mod: S$GLB,,, | Performed by: PHYSICIAN ASSISTANT

## 2021-04-14 PROCEDURE — 73610 X-RAY EXAM OF ANKLE: CPT | Mod: 26,LT,, | Performed by: RADIOLOGY

## 2021-04-14 PROCEDURE — 3008F BODY MASS INDEX DOCD: CPT | Mod: CPTII,S$GLB,, | Performed by: PHYSICIAN ASSISTANT

## 2021-04-14 PROCEDURE — 99999 PR PBB SHADOW E&M-EST. PATIENT-LVL III: CPT | Mod: PBBFAC,,, | Performed by: PHYSICIAN ASSISTANT

## 2021-04-14 PROCEDURE — 3288F FALL RISK ASSESSMENT DOCD: CPT | Mod: CPTII,S$GLB,, | Performed by: PHYSICIAN ASSISTANT

## 2021-04-14 PROCEDURE — 1100F PR PT FALLS ASSESS DOC 2+ FALLS/FALL W/INJURY/YR: ICD-10-PCS | Mod: CPTII,S$GLB,, | Performed by: PHYSICIAN ASSISTANT

## 2021-04-14 PROCEDURE — 73610 X-RAY EXAM OF ANKLE: CPT | Mod: TC,LT

## 2021-04-14 PROCEDURE — 1125F AMNT PAIN NOTED PAIN PRSNT: CPT | Mod: S$GLB,,, | Performed by: PHYSICIAN ASSISTANT

## 2021-04-14 PROCEDURE — 73610 XR ANKLE COMPLETE 3 VIEW LEFT: ICD-10-PCS | Mod: 26,LT,, | Performed by: RADIOLOGY

## 2021-04-14 PROCEDURE — 1100F PTFALLS ASSESS-DOCD GE2>/YR: CPT | Mod: CPTII,S$GLB,, | Performed by: PHYSICIAN ASSISTANT

## 2021-04-14 PROCEDURE — 99214 PR OFFICE/OUTPT VISIT, EST, LEVL IV, 30-39 MIN: ICD-10-PCS | Mod: S$GLB,,, | Performed by: PHYSICIAN ASSISTANT

## 2021-04-14 PROCEDURE — 99214 OFFICE O/P EST MOD 30 MIN: CPT | Mod: S$GLB,,, | Performed by: PHYSICIAN ASSISTANT

## 2021-04-14 PROCEDURE — 99999 PR PBB SHADOW E&M-EST. PATIENT-LVL III: ICD-10-PCS | Mod: PBBFAC,,, | Performed by: PHYSICIAN ASSISTANT

## 2021-04-14 PROCEDURE — 3008F PR BODY MASS INDEX (BMI) DOCUMENTED: ICD-10-PCS | Mod: CPTII,S$GLB,, | Performed by: PHYSICIAN ASSISTANT

## 2021-04-14 PROCEDURE — 3288F PR FALLS RISK ASSESSMENT DOCUMENTED: ICD-10-PCS | Mod: CPTII,S$GLB,, | Performed by: PHYSICIAN ASSISTANT

## 2021-04-15 ENCOUNTER — DOCUMENTATION ONLY (OUTPATIENT)
Dept: REHABILITATION | Facility: OTHER | Age: 66
End: 2021-04-15

## 2021-04-16 ENCOUNTER — PATIENT MESSAGE (OUTPATIENT)
Dept: REHABILITATION | Facility: OTHER | Age: 66
End: 2021-04-16

## 2021-04-20 ENCOUNTER — PATIENT MESSAGE (OUTPATIENT)
Dept: REHABILITATION | Facility: OTHER | Age: 66
End: 2021-04-20

## 2021-04-22 ENCOUNTER — CLINICAL SUPPORT (OUTPATIENT)
Dept: REHABILITATION | Facility: HOSPITAL | Age: 66
End: 2021-04-22
Attending: OBSTETRICS & GYNECOLOGY
Payer: COMMERCIAL

## 2021-04-22 DIAGNOSIS — M62.81 DECREASED MUSCLE STRENGTH: ICD-10-CM

## 2021-04-22 DIAGNOSIS — R10.2 PELVIC PAIN IN FEMALE: ICD-10-CM

## 2021-04-22 PROCEDURE — 97140 MANUAL THERAPY 1/> REGIONS: CPT | Mod: PO

## 2021-04-22 PROCEDURE — 97110 THERAPEUTIC EXERCISES: CPT | Mod: PO

## 2021-04-22 PROCEDURE — 97112 NEUROMUSCULAR REEDUCATION: CPT | Mod: PO

## 2021-04-26 ENCOUNTER — CLINICAL SUPPORT (OUTPATIENT)
Dept: REHABILITATION | Facility: OTHER | Age: 66
End: 2021-04-26
Attending: OBSTETRICS & GYNECOLOGY
Payer: COMMERCIAL

## 2021-04-26 DIAGNOSIS — M53.3 CHRONIC RIGHT SI JOINT PAIN: ICD-10-CM

## 2021-04-26 DIAGNOSIS — G89.29 CHRONIC RIGHT SI JOINT PAIN: ICD-10-CM

## 2021-04-26 DIAGNOSIS — R53.1 DECREASED STRENGTH, ENDURANCE, AND MOBILITY: ICD-10-CM

## 2021-04-26 DIAGNOSIS — Z74.09 DECREASED STRENGTH, ENDURANCE, AND MOBILITY: ICD-10-CM

## 2021-04-26 DIAGNOSIS — G89.29 CHRONIC RIGHT-SIDED LOW BACK PAIN WITHOUT SCIATICA: ICD-10-CM

## 2021-04-26 DIAGNOSIS — M54.50 CHRONIC RIGHT-SIDED LOW BACK PAIN WITHOUT SCIATICA: ICD-10-CM

## 2021-04-26 DIAGNOSIS — R68.89 DECREASED STRENGTH, ENDURANCE, AND MOBILITY: ICD-10-CM

## 2021-04-26 PROCEDURE — 97161 PT EVAL LOW COMPLEX 20 MIN: CPT | Mod: PN

## 2021-04-26 PROCEDURE — 97140 MANUAL THERAPY 1/> REGIONS: CPT | Mod: PN

## 2021-04-26 PROCEDURE — 97130 THER IVNTJ EA ADDL 15 MIN: CPT | Mod: PN

## 2021-04-28 ENCOUNTER — PATIENT MESSAGE (OUTPATIENT)
Dept: REHABILITATION | Facility: HOSPITAL | Age: 66
End: 2021-04-28

## 2021-04-29 ENCOUNTER — PATIENT MESSAGE (OUTPATIENT)
Dept: REHABILITATION | Facility: OTHER | Age: 66
End: 2021-04-29

## 2021-04-29 ENCOUNTER — DOCUMENTATION ONLY (OUTPATIENT)
Dept: REHABILITATION | Facility: HOSPITAL | Age: 66
End: 2021-04-29

## 2021-05-03 ENCOUNTER — CLINICAL SUPPORT (OUTPATIENT)
Dept: REHABILITATION | Facility: OTHER | Age: 66
End: 2021-05-03
Attending: OBSTETRICS & GYNECOLOGY
Payer: COMMERCIAL

## 2021-05-03 DIAGNOSIS — G89.29 CHRONIC RIGHT SI JOINT PAIN: ICD-10-CM

## 2021-05-03 DIAGNOSIS — M53.3 CHRONIC RIGHT SI JOINT PAIN: ICD-10-CM

## 2021-05-03 DIAGNOSIS — R68.89 DECREASED STRENGTH, ENDURANCE, AND MOBILITY: ICD-10-CM

## 2021-05-03 DIAGNOSIS — Z74.09 DECREASED STRENGTH, ENDURANCE, AND MOBILITY: ICD-10-CM

## 2021-05-03 DIAGNOSIS — G89.29 CHRONIC RIGHT-SIDED LOW BACK PAIN WITHOUT SCIATICA: ICD-10-CM

## 2021-05-03 DIAGNOSIS — R53.1 DECREASED STRENGTH, ENDURANCE, AND MOBILITY: ICD-10-CM

## 2021-05-03 DIAGNOSIS — M54.50 CHRONIC RIGHT-SIDED LOW BACK PAIN WITHOUT SCIATICA: ICD-10-CM

## 2021-05-03 PROCEDURE — 97110 THERAPEUTIC EXERCISES: CPT | Mod: PN

## 2021-05-03 PROCEDURE — 97140 MANUAL THERAPY 1/> REGIONS: CPT | Mod: PN

## 2021-05-10 ENCOUNTER — CLINICAL SUPPORT (OUTPATIENT)
Dept: REHABILITATION | Facility: OTHER | Age: 66
End: 2021-05-10
Payer: COMMERCIAL

## 2021-05-10 DIAGNOSIS — M54.50 CHRONIC RIGHT-SIDED LOW BACK PAIN WITHOUT SCIATICA: ICD-10-CM

## 2021-05-10 DIAGNOSIS — M53.3 CHRONIC RIGHT SI JOINT PAIN: ICD-10-CM

## 2021-05-10 DIAGNOSIS — G89.29 CHRONIC RIGHT-SIDED LOW BACK PAIN WITHOUT SCIATICA: ICD-10-CM

## 2021-05-10 DIAGNOSIS — Z74.09 DECREASED STRENGTH, ENDURANCE, AND MOBILITY: ICD-10-CM

## 2021-05-10 DIAGNOSIS — R68.89 DECREASED STRENGTH, ENDURANCE, AND MOBILITY: ICD-10-CM

## 2021-05-10 DIAGNOSIS — R53.1 DECREASED STRENGTH, ENDURANCE, AND MOBILITY: ICD-10-CM

## 2021-05-10 DIAGNOSIS — G89.29 CHRONIC RIGHT SI JOINT PAIN: ICD-10-CM

## 2021-05-10 PROCEDURE — 97530 THERAPEUTIC ACTIVITIES: CPT | Mod: PN

## 2021-05-10 PROCEDURE — 97140 MANUAL THERAPY 1/> REGIONS: CPT | Mod: PN

## 2021-05-10 PROCEDURE — 97110 THERAPEUTIC EXERCISES: CPT | Mod: PN

## 2021-05-17 ENCOUNTER — CLINICAL SUPPORT (OUTPATIENT)
Dept: REHABILITATION | Facility: OTHER | Age: 66
End: 2021-05-17
Payer: COMMERCIAL

## 2021-05-17 DIAGNOSIS — G89.29 CHRONIC RIGHT SI JOINT PAIN: ICD-10-CM

## 2021-05-17 DIAGNOSIS — M54.50 CHRONIC RIGHT-SIDED LOW BACK PAIN WITHOUT SCIATICA: ICD-10-CM

## 2021-05-17 DIAGNOSIS — M53.3 CHRONIC RIGHT SI JOINT PAIN: ICD-10-CM

## 2021-05-17 DIAGNOSIS — G89.29 CHRONIC RIGHT-SIDED LOW BACK PAIN WITHOUT SCIATICA: ICD-10-CM

## 2021-05-17 DIAGNOSIS — R68.89 DECREASED STRENGTH, ENDURANCE, AND MOBILITY: ICD-10-CM

## 2021-05-17 DIAGNOSIS — Z74.09 DECREASED STRENGTH, ENDURANCE, AND MOBILITY: ICD-10-CM

## 2021-05-17 DIAGNOSIS — R53.1 DECREASED STRENGTH, ENDURANCE, AND MOBILITY: ICD-10-CM

## 2021-05-17 PROCEDURE — 97140 MANUAL THERAPY 1/> REGIONS: CPT | Mod: PN

## 2021-05-17 PROCEDURE — 97530 THERAPEUTIC ACTIVITIES: CPT | Mod: PN

## 2021-05-17 PROCEDURE — 97110 THERAPEUTIC EXERCISES: CPT | Mod: PN

## 2021-05-20 ENCOUNTER — CLINICAL SUPPORT (OUTPATIENT)
Dept: REHABILITATION | Facility: OTHER | Age: 66
End: 2021-05-20
Payer: COMMERCIAL

## 2021-05-20 DIAGNOSIS — M54.50 CHRONIC RIGHT-SIDED LOW BACK PAIN WITHOUT SCIATICA: ICD-10-CM

## 2021-05-20 DIAGNOSIS — G89.29 CHRONIC RIGHT-SIDED LOW BACK PAIN WITHOUT SCIATICA: ICD-10-CM

## 2021-05-20 DIAGNOSIS — R68.89 DECREASED STRENGTH, ENDURANCE, AND MOBILITY: ICD-10-CM

## 2021-05-20 DIAGNOSIS — M53.3 CHRONIC RIGHT SI JOINT PAIN: ICD-10-CM

## 2021-05-20 DIAGNOSIS — R53.1 DECREASED STRENGTH, ENDURANCE, AND MOBILITY: ICD-10-CM

## 2021-05-20 DIAGNOSIS — Z74.09 DECREASED STRENGTH, ENDURANCE, AND MOBILITY: ICD-10-CM

## 2021-05-20 DIAGNOSIS — G89.29 CHRONIC RIGHT SI JOINT PAIN: ICD-10-CM

## 2021-05-20 PROCEDURE — 97140 MANUAL THERAPY 1/> REGIONS: CPT | Mod: PN

## 2021-05-20 PROCEDURE — 97112 NEUROMUSCULAR REEDUCATION: CPT | Mod: PN

## 2021-05-20 PROCEDURE — 97530 THERAPEUTIC ACTIVITIES: CPT | Mod: PN

## 2021-05-24 ENCOUNTER — CLINICAL SUPPORT (OUTPATIENT)
Dept: REHABILITATION | Facility: OTHER | Age: 66
End: 2021-05-24
Payer: COMMERCIAL

## 2021-05-24 DIAGNOSIS — M53.3 CHRONIC RIGHT SI JOINT PAIN: ICD-10-CM

## 2021-05-24 DIAGNOSIS — Z74.09 DECREASED STRENGTH, ENDURANCE, AND MOBILITY: ICD-10-CM

## 2021-05-24 DIAGNOSIS — G89.29 CHRONIC RIGHT-SIDED LOW BACK PAIN WITHOUT SCIATICA: ICD-10-CM

## 2021-05-24 DIAGNOSIS — G89.29 CHRONIC RIGHT SI JOINT PAIN: ICD-10-CM

## 2021-05-24 DIAGNOSIS — R53.1 DECREASED STRENGTH, ENDURANCE, AND MOBILITY: ICD-10-CM

## 2021-05-24 DIAGNOSIS — R68.89 DECREASED STRENGTH, ENDURANCE, AND MOBILITY: ICD-10-CM

## 2021-05-24 DIAGNOSIS — M54.50 CHRONIC RIGHT-SIDED LOW BACK PAIN WITHOUT SCIATICA: ICD-10-CM

## 2021-05-24 PROCEDURE — 97530 THERAPEUTIC ACTIVITIES: CPT | Mod: PN

## 2021-05-24 PROCEDURE — 97112 NEUROMUSCULAR REEDUCATION: CPT | Mod: PN

## 2021-05-24 PROCEDURE — 97140 MANUAL THERAPY 1/> REGIONS: CPT | Mod: PN

## 2021-06-03 ENCOUNTER — TELEPHONE (OUTPATIENT)
Dept: OBSTETRICS AND GYNECOLOGY | Facility: CLINIC | Age: 66
End: 2021-06-03

## 2021-06-03 ENCOUNTER — CLINICAL SUPPORT (OUTPATIENT)
Dept: REHABILITATION | Facility: HOSPITAL | Age: 66
End: 2021-06-03
Attending: OBSTETRICS & GYNECOLOGY
Payer: COMMERCIAL

## 2021-06-03 DIAGNOSIS — R10.2 PELVIC PAIN IN FEMALE: ICD-10-CM

## 2021-06-03 DIAGNOSIS — M62.81 DECREASED MUSCLE STRENGTH: ICD-10-CM

## 2021-06-03 PROCEDURE — 97112 NEUROMUSCULAR REEDUCATION: CPT | Mod: PO

## 2021-06-04 ENCOUNTER — CLINICAL SUPPORT (OUTPATIENT)
Dept: REHABILITATION | Facility: OTHER | Age: 66
End: 2021-06-04
Attending: OBSTETRICS & GYNECOLOGY
Payer: COMMERCIAL

## 2021-06-04 ENCOUNTER — DOCUMENTATION ONLY (OUTPATIENT)
Dept: REHABILITATION | Facility: OTHER | Age: 66
End: 2021-06-04

## 2021-06-04 DIAGNOSIS — G89.29 CHRONIC RIGHT-SIDED LOW BACK PAIN WITHOUT SCIATICA: ICD-10-CM

## 2021-06-04 DIAGNOSIS — G89.29 CHRONIC RIGHT SI JOINT PAIN: ICD-10-CM

## 2021-06-04 DIAGNOSIS — M53.3 CHRONIC RIGHT SI JOINT PAIN: ICD-10-CM

## 2021-06-04 DIAGNOSIS — M54.50 CHRONIC RIGHT-SIDED LOW BACK PAIN WITHOUT SCIATICA: ICD-10-CM

## 2021-06-04 DIAGNOSIS — R53.1 DECREASED STRENGTH, ENDURANCE, AND MOBILITY: ICD-10-CM

## 2021-06-04 DIAGNOSIS — Z74.09 DECREASED STRENGTH, ENDURANCE, AND MOBILITY: ICD-10-CM

## 2021-06-04 DIAGNOSIS — R68.89 DECREASED STRENGTH, ENDURANCE, AND MOBILITY: ICD-10-CM

## 2021-06-04 PROCEDURE — 97110 THERAPEUTIC EXERCISES: CPT | Mod: PN

## 2021-06-04 PROCEDURE — 97140 MANUAL THERAPY 1/> REGIONS: CPT | Mod: PN

## 2021-06-04 PROCEDURE — 97112 NEUROMUSCULAR REEDUCATION: CPT | Mod: PN

## 2021-06-15 ENCOUNTER — CLINICAL SUPPORT (OUTPATIENT)
Dept: REHABILITATION | Facility: OTHER | Age: 66
End: 2021-06-15
Payer: COMMERCIAL

## 2021-06-15 DIAGNOSIS — G89.29 CHRONIC RIGHT-SIDED LOW BACK PAIN WITHOUT SCIATICA: ICD-10-CM

## 2021-06-15 DIAGNOSIS — G89.29 CHRONIC RIGHT SI JOINT PAIN: ICD-10-CM

## 2021-06-15 DIAGNOSIS — R53.1 DECREASED STRENGTH, ENDURANCE, AND MOBILITY: ICD-10-CM

## 2021-06-15 DIAGNOSIS — M53.3 CHRONIC RIGHT SI JOINT PAIN: ICD-10-CM

## 2021-06-15 DIAGNOSIS — Z74.09 DECREASED STRENGTH, ENDURANCE, AND MOBILITY: ICD-10-CM

## 2021-06-15 DIAGNOSIS — R68.89 DECREASED STRENGTH, ENDURANCE, AND MOBILITY: ICD-10-CM

## 2021-06-15 DIAGNOSIS — M54.50 CHRONIC RIGHT-SIDED LOW BACK PAIN WITHOUT SCIATICA: ICD-10-CM

## 2021-06-15 PROCEDURE — 97530 THERAPEUTIC ACTIVITIES: CPT | Mod: PN

## 2021-06-15 PROCEDURE — 97112 NEUROMUSCULAR REEDUCATION: CPT | Mod: PN

## 2021-06-22 ENCOUNTER — CLINICAL SUPPORT (OUTPATIENT)
Dept: REHABILITATION | Facility: OTHER | Age: 66
End: 2021-06-22
Payer: COMMERCIAL

## 2021-06-22 DIAGNOSIS — R53.1 DECREASED STRENGTH, ENDURANCE, AND MOBILITY: ICD-10-CM

## 2021-06-22 DIAGNOSIS — M53.3 CHRONIC RIGHT SI JOINT PAIN: ICD-10-CM

## 2021-06-22 DIAGNOSIS — G89.29 CHRONIC RIGHT SI JOINT PAIN: ICD-10-CM

## 2021-06-22 DIAGNOSIS — G89.29 CHRONIC RIGHT-SIDED LOW BACK PAIN WITHOUT SCIATICA: ICD-10-CM

## 2021-06-22 DIAGNOSIS — Z74.09 DECREASED STRENGTH, ENDURANCE, AND MOBILITY: ICD-10-CM

## 2021-06-22 DIAGNOSIS — R68.89 DECREASED STRENGTH, ENDURANCE, AND MOBILITY: ICD-10-CM

## 2021-06-22 DIAGNOSIS — M54.50 CHRONIC RIGHT-SIDED LOW BACK PAIN WITHOUT SCIATICA: ICD-10-CM

## 2021-06-22 PROCEDURE — 97140 MANUAL THERAPY 1/> REGIONS: CPT | Mod: PN

## 2021-06-22 PROCEDURE — 97530 THERAPEUTIC ACTIVITIES: CPT | Mod: PN

## 2021-06-29 ENCOUNTER — CLINICAL SUPPORT (OUTPATIENT)
Dept: REHABILITATION | Facility: OTHER | Age: 66
End: 2021-06-29
Payer: COMMERCIAL

## 2021-06-29 DIAGNOSIS — G89.29 CHRONIC RIGHT-SIDED LOW BACK PAIN WITHOUT SCIATICA: ICD-10-CM

## 2021-06-29 DIAGNOSIS — Z74.09 DECREASED STRENGTH, ENDURANCE, AND MOBILITY: ICD-10-CM

## 2021-06-29 DIAGNOSIS — R53.1 DECREASED STRENGTH, ENDURANCE, AND MOBILITY: ICD-10-CM

## 2021-06-29 DIAGNOSIS — R68.89 DECREASED STRENGTH, ENDURANCE, AND MOBILITY: ICD-10-CM

## 2021-06-29 DIAGNOSIS — M54.50 CHRONIC RIGHT-SIDED LOW BACK PAIN WITHOUT SCIATICA: ICD-10-CM

## 2021-06-29 DIAGNOSIS — M53.3 CHRONIC RIGHT SI JOINT PAIN: ICD-10-CM

## 2021-06-29 DIAGNOSIS — G89.29 CHRONIC RIGHT SI JOINT PAIN: ICD-10-CM

## 2021-06-29 PROCEDURE — 97140 MANUAL THERAPY 1/> REGIONS: CPT | Mod: PN

## 2021-07-06 DIAGNOSIS — M43.16 SPONDYLOLISTHESIS OF LUMBAR REGION: Primary | ICD-10-CM

## 2021-07-07 ENCOUNTER — CLINICAL SUPPORT (OUTPATIENT)
Dept: REHABILITATION | Facility: OTHER | Age: 66
End: 2021-07-07
Payer: COMMERCIAL

## 2021-07-07 DIAGNOSIS — M53.3 CHRONIC RIGHT SI JOINT PAIN: ICD-10-CM

## 2021-07-07 DIAGNOSIS — R68.89 DECREASED STRENGTH, ENDURANCE, AND MOBILITY: ICD-10-CM

## 2021-07-07 DIAGNOSIS — Z74.09 DECREASED STRENGTH, ENDURANCE, AND MOBILITY: ICD-10-CM

## 2021-07-07 DIAGNOSIS — M54.50 CHRONIC RIGHT-SIDED LOW BACK PAIN WITHOUT SCIATICA: ICD-10-CM

## 2021-07-07 DIAGNOSIS — R53.1 DECREASED STRENGTH, ENDURANCE, AND MOBILITY: ICD-10-CM

## 2021-07-07 DIAGNOSIS — G89.29 CHRONIC RIGHT-SIDED LOW BACK PAIN WITHOUT SCIATICA: ICD-10-CM

## 2021-07-07 DIAGNOSIS — G89.29 CHRONIC RIGHT SI JOINT PAIN: ICD-10-CM

## 2021-07-07 PROCEDURE — 97530 THERAPEUTIC ACTIVITIES: CPT | Mod: PN

## 2021-07-07 PROCEDURE — 97112 NEUROMUSCULAR REEDUCATION: CPT | Mod: PN

## 2021-07-10 ENCOUNTER — HOSPITAL ENCOUNTER (OUTPATIENT)
Dept: RADIOLOGY | Facility: OTHER | Age: 66
Discharge: HOME | End: 2021-07-10
Attending: PHYSICIAN ASSISTANT
Payer: COMMERCIAL

## 2021-07-10 DIAGNOSIS — M43.16 SPONDYLOLISTHESIS OF LUMBAR REGION: ICD-10-CM

## 2021-07-10 PROCEDURE — 72148 MRI LUMBAR SPINE W/O DYE: CPT | Mod: 26,,, | Performed by: RADIOLOGY

## 2021-07-10 PROCEDURE — 72148 MRI LUMBAR SPINE W/O DYE: CPT | Mod: TC

## 2021-07-10 PROCEDURE — 72148 MRI LUMBAR SPINE WITHOUT CONTRAST: ICD-10-PCS | Mod: 26,,, | Performed by: RADIOLOGY

## 2021-07-12 ENCOUNTER — OFFICE VISIT (OUTPATIENT)
Dept: ORTHOPEDICS | Facility: CLINIC | Age: 66
End: 2021-07-12
Payer: COMMERCIAL

## 2021-07-12 DIAGNOSIS — M43.16 SPONDYLOLISTHESIS OF LUMBAR REGION: Primary | ICD-10-CM

## 2021-07-12 PROCEDURE — 99213 OFFICE O/P EST LOW 20 MIN: CPT | Mod: 95,,, | Performed by: PHYSICIAN ASSISTANT

## 2021-07-12 PROCEDURE — 99213 PR OFFICE/OUTPT VISIT, EST, LEVL III, 20-29 MIN: ICD-10-PCS | Mod: 95,,, | Performed by: PHYSICIAN ASSISTANT

## 2021-07-12 RX ORDER — METHOCARBAMOL 750 MG/1
750 TABLET, FILM COATED ORAL 3 TIMES DAILY PRN
Qty: 60 TABLET | Refills: 0 | Status: SHIPPED | OUTPATIENT
Start: 2021-07-12

## 2021-07-12 RX ORDER — MELOXICAM 15 MG/1
15 TABLET ORAL DAILY
Qty: 30 TABLET | Refills: 0 | Status: SHIPPED | OUTPATIENT
Start: 2021-07-12 | End: 2022-04-07 | Stop reason: SDUPTHER

## 2021-08-13 ENCOUNTER — CLINICAL SUPPORT (OUTPATIENT)
Dept: REHABILITATION | Facility: OTHER | Age: 66
End: 2021-08-13
Payer: COMMERCIAL

## 2021-08-13 DIAGNOSIS — G89.29 CHRONIC RIGHT SI JOINT PAIN: ICD-10-CM

## 2021-08-13 DIAGNOSIS — M53.3 CHRONIC RIGHT SI JOINT PAIN: ICD-10-CM

## 2021-08-13 DIAGNOSIS — G89.29 CHRONIC RIGHT-SIDED LOW BACK PAIN WITHOUT SCIATICA: ICD-10-CM

## 2021-08-13 DIAGNOSIS — R53.1 DECREASED STRENGTH, ENDURANCE, AND MOBILITY: ICD-10-CM

## 2021-08-13 DIAGNOSIS — Z74.09 DECREASED STRENGTH, ENDURANCE, AND MOBILITY: ICD-10-CM

## 2021-08-13 DIAGNOSIS — R68.89 DECREASED STRENGTH, ENDURANCE, AND MOBILITY: ICD-10-CM

## 2021-08-13 DIAGNOSIS — M54.50 CHRONIC RIGHT-SIDED LOW BACK PAIN WITHOUT SCIATICA: ICD-10-CM

## 2021-08-13 PROCEDURE — 97530 THERAPEUTIC ACTIVITIES: CPT | Mod: PN

## 2021-08-13 PROCEDURE — 97164 PT RE-EVAL EST PLAN CARE: CPT | Mod: PN

## 2021-08-13 PROCEDURE — 97140 MANUAL THERAPY 1/> REGIONS: CPT | Mod: PN

## 2021-09-21 ENCOUNTER — OFFICE VISIT (OUTPATIENT)
Dept: URGENT CARE | Facility: CLINIC | Age: 66
End: 2021-09-21
Payer: COMMERCIAL

## 2021-09-21 VITALS
TEMPERATURE: 98 F | HEIGHT: 67 IN | DIASTOLIC BLOOD PRESSURE: 74 MMHG | OXYGEN SATURATION: 96 % | WEIGHT: 191 LBS | SYSTOLIC BLOOD PRESSURE: 125 MMHG | RESPIRATION RATE: 19 BRPM | HEART RATE: 93 BPM | BODY MASS INDEX: 29.98 KG/M2

## 2021-09-21 DIAGNOSIS — R39.15 URGENCY OF URINATION: Primary | ICD-10-CM

## 2021-09-21 DIAGNOSIS — Z78.0 POSTMENOPAUSAL ESTROGEN DEFICIENCY: ICD-10-CM

## 2021-09-21 DIAGNOSIS — N30.90 CYSTITIS: ICD-10-CM

## 2021-09-21 PROBLEM — F33.0 MILD EPISODE OF RECURRENT MAJOR DEPRESSIVE DISORDER: Status: ACTIVE | Noted: 2018-05-04

## 2021-09-21 LAB
BILIRUB UR QL STRIP: NEGATIVE
GLUCOSE UR QL STRIP: NEGATIVE
KETONES UR QL STRIP: NEGATIVE
LEUKOCYTE ESTERASE UR QL STRIP: POSITIVE
PH, POC UA: 5 (ref 5–8)
POC BLOOD, URINE: POSITIVE
POC NITRATES, URINE: NEGATIVE
PROT UR QL STRIP: POSITIVE
SP GR UR STRIP: 1.02 (ref 1–1.03)
UROBILINOGEN UR STRIP-ACNC: NORMAL (ref 0.1–1.1)

## 2021-09-21 PROCEDURE — 87086 URINE CULTURE/COLONY COUNT: CPT | Performed by: FAMILY MEDICINE

## 2021-09-21 PROCEDURE — 3008F BODY MASS INDEX DOCD: CPT | Mod: CPTII,S$GLB,, | Performed by: FAMILY MEDICINE

## 2021-09-21 PROCEDURE — 99214 PR OFFICE/OUTPT VISIT, EST, LEVL IV, 30-39 MIN: ICD-10-PCS | Mod: 25,S$GLB,, | Performed by: FAMILY MEDICINE

## 2021-09-21 PROCEDURE — 1159F MED LIST DOCD IN RCRD: CPT | Mod: CPTII,S$GLB,, | Performed by: FAMILY MEDICINE

## 2021-09-21 PROCEDURE — 3008F PR BODY MASS INDEX (BMI) DOCUMENTED: ICD-10-PCS | Mod: CPTII,S$GLB,, | Performed by: FAMILY MEDICINE

## 2021-09-21 PROCEDURE — 81003 POCT URINALYSIS, DIPSTICK, AUTOMATED, W/O SCOPE: ICD-10-PCS | Mod: QW,S$GLB,, | Performed by: FAMILY MEDICINE

## 2021-09-21 PROCEDURE — 1160F PR REVIEW ALL MEDS BY PRESCRIBER/CLIN PHARMACIST DOCUMENTED: ICD-10-PCS | Mod: CPTII,S$GLB,, | Performed by: FAMILY MEDICINE

## 2021-09-21 PROCEDURE — 3074F SYST BP LT 130 MM HG: CPT | Mod: CPTII,S$GLB,, | Performed by: FAMILY MEDICINE

## 2021-09-21 PROCEDURE — 1159F PR MEDICATION LIST DOCUMENTED IN MEDICAL RECORD: ICD-10-PCS | Mod: CPTII,S$GLB,, | Performed by: FAMILY MEDICINE

## 2021-09-21 PROCEDURE — 81003 URINALYSIS AUTO W/O SCOPE: CPT | Mod: QW,S$GLB,, | Performed by: FAMILY MEDICINE

## 2021-09-21 PROCEDURE — 1160F RVW MEDS BY RX/DR IN RCRD: CPT | Mod: CPTII,S$GLB,, | Performed by: FAMILY MEDICINE

## 2021-09-21 PROCEDURE — 3074F PR MOST RECENT SYSTOLIC BLOOD PRESSURE < 130 MM HG: ICD-10-PCS | Mod: CPTII,S$GLB,, | Performed by: FAMILY MEDICINE

## 2021-09-21 PROCEDURE — 99214 OFFICE O/P EST MOD 30 MIN: CPT | Mod: 25,S$GLB,, | Performed by: FAMILY MEDICINE

## 2021-09-21 PROCEDURE — 3078F PR MOST RECENT DIASTOLIC BLOOD PRESSURE < 80 MM HG: ICD-10-PCS | Mod: CPTII,S$GLB,, | Performed by: FAMILY MEDICINE

## 2021-09-21 PROCEDURE — 3078F DIAST BP <80 MM HG: CPT | Mod: CPTII,S$GLB,, | Performed by: FAMILY MEDICINE

## 2021-09-21 RX ORDER — HYDROCORTISONE 25 MG/G
CREAM TOPICAL 2 TIMES DAILY
COMMUNITY
Start: 2021-06-05 | End: 2023-07-05

## 2021-09-21 RX ORDER — NITROFURANTOIN 25; 75 MG/1; MG/1
100 CAPSULE ORAL 2 TIMES DAILY
Qty: 10 CAPSULE | Refills: 0 | Status: SHIPPED | OUTPATIENT
Start: 2021-09-21 | End: 2021-09-26

## 2021-09-21 RX ORDER — PHENAZOPYRIDINE HYDROCHLORIDE 200 MG/1
200 TABLET, FILM COATED ORAL 3 TIMES DAILY PRN
Qty: 15 TABLET | Refills: 0 | Status: SHIPPED | OUTPATIENT
Start: 2021-09-21 | End: 2022-09-21

## 2021-09-22 ENCOUNTER — TELEPHONE (OUTPATIENT)
Dept: OBSTETRICS AND GYNECOLOGY | Facility: CLINIC | Age: 66
End: 2021-09-22

## 2021-09-22 LAB — BACTERIA UR CULT: NO GROWTH

## 2021-09-23 ENCOUNTER — TELEPHONE (OUTPATIENT)
Dept: URGENT CARE | Facility: CLINIC | Age: 66
End: 2021-09-23

## 2021-09-23 ENCOUNTER — TELEPHONE (OUTPATIENT)
Dept: OBSTETRICS AND GYNECOLOGY | Facility: CLINIC | Age: 66
End: 2021-09-23

## 2021-09-23 DIAGNOSIS — N95.2 VAGINAL ATROPHY: ICD-10-CM

## 2021-09-23 RX ORDER — ESTRADIOL 10 UG/1
1 INSERT VAGINAL NIGHTLY
Qty: 18 EACH | Refills: 0 | Status: SHIPPED | OUTPATIENT
Start: 2021-09-23 | End: 2023-06-21 | Stop reason: SDUPTHER

## 2021-09-23 RX ORDER — ESTRADIOL 10 UG/1
1 INSERT VAGINAL
Qty: 8 EACH | Refills: 6 | Status: SHIPPED | OUTPATIENT
Start: 2021-09-23 | End: 2023-08-01 | Stop reason: SDUPTHER

## 2021-09-24 ENCOUNTER — TELEPHONE (OUTPATIENT)
Dept: URGENT CARE | Facility: CLINIC | Age: 66
End: 2021-09-24

## 2021-10-13 ENCOUNTER — TELEPHONE (OUTPATIENT)
Dept: ORTHOPEDICS | Facility: CLINIC | Age: 66
End: 2021-10-13

## 2021-10-13 ENCOUNTER — CLINICAL SUPPORT (OUTPATIENT)
Dept: REHABILITATION | Facility: OTHER | Age: 66
End: 2021-10-13
Payer: COMMERCIAL

## 2021-10-13 DIAGNOSIS — R68.89 DECREASED STRENGTH, ENDURANCE, AND MOBILITY: ICD-10-CM

## 2021-10-13 DIAGNOSIS — M54.50 CHRONIC RIGHT-SIDED LOW BACK PAIN WITHOUT SCIATICA: ICD-10-CM

## 2021-10-13 DIAGNOSIS — R53.1 DECREASED STRENGTH, ENDURANCE, AND MOBILITY: ICD-10-CM

## 2021-10-13 DIAGNOSIS — M43.16 SPONDYLOLISTHESIS OF LUMBAR REGION: Primary | ICD-10-CM

## 2021-10-13 DIAGNOSIS — Z74.09 DECREASED STRENGTH, ENDURANCE, AND MOBILITY: ICD-10-CM

## 2021-10-13 DIAGNOSIS — G89.29 CHRONIC RIGHT-SIDED LOW BACK PAIN WITHOUT SCIATICA: ICD-10-CM

## 2021-10-13 DIAGNOSIS — G89.29 CHRONIC RIGHT SI JOINT PAIN: ICD-10-CM

## 2021-10-13 DIAGNOSIS — M53.3 CHRONIC RIGHT SI JOINT PAIN: ICD-10-CM

## 2021-10-13 PROCEDURE — 97110 THERAPEUTIC EXERCISES: CPT | Mod: PN

## 2021-10-13 PROCEDURE — 97140 MANUAL THERAPY 1/> REGIONS: CPT | Mod: PN

## 2021-10-15 ENCOUNTER — CLINICAL SUPPORT (OUTPATIENT)
Dept: REHABILITATION | Facility: OTHER | Age: 66
End: 2021-10-15
Payer: COMMERCIAL

## 2021-10-15 DIAGNOSIS — Z74.09 DECREASED STRENGTH, ENDURANCE, AND MOBILITY: ICD-10-CM

## 2021-10-15 DIAGNOSIS — G89.29 CHRONIC RIGHT SI JOINT PAIN: ICD-10-CM

## 2021-10-15 DIAGNOSIS — R68.89 DECREASED STRENGTH, ENDURANCE, AND MOBILITY: ICD-10-CM

## 2021-10-15 DIAGNOSIS — M54.50 CHRONIC RIGHT-SIDED LOW BACK PAIN WITHOUT SCIATICA: ICD-10-CM

## 2021-10-15 DIAGNOSIS — R53.1 DECREASED STRENGTH, ENDURANCE, AND MOBILITY: ICD-10-CM

## 2021-10-15 DIAGNOSIS — M53.3 CHRONIC RIGHT SI JOINT PAIN: ICD-10-CM

## 2021-10-15 DIAGNOSIS — G89.29 CHRONIC RIGHT-SIDED LOW BACK PAIN WITHOUT SCIATICA: ICD-10-CM

## 2021-10-15 PROCEDURE — 97140 MANUAL THERAPY 1/> REGIONS: CPT | Mod: PN

## 2021-11-18 ENCOUNTER — CLINICAL SUPPORT (OUTPATIENT)
Dept: REHABILITATION | Facility: OTHER | Age: 66
End: 2021-11-18
Attending: PHYSICIAN ASSISTANT
Payer: COMMERCIAL

## 2021-11-18 DIAGNOSIS — R29.898 DECREASED STRENGTH OF TRUNK AND BACK: ICD-10-CM

## 2021-11-18 PROCEDURE — 97110 THERAPEUTIC EXERCISES: CPT

## 2021-11-18 PROCEDURE — 97161 PT EVAL LOW COMPLEX 20 MIN: CPT

## 2021-12-13 ENCOUNTER — CLINICAL SUPPORT (OUTPATIENT)
Dept: REHABILITATION | Facility: OTHER | Age: 66
End: 2021-12-13
Attending: PHYSICIAN ASSISTANT
Payer: COMMERCIAL

## 2021-12-13 DIAGNOSIS — R29.898 DECREASED STRENGTH OF TRUNK AND BACK: Primary | ICD-10-CM

## 2021-12-13 PROCEDURE — 97110 THERAPEUTIC EXERCISES: CPT

## 2022-01-24 ENCOUNTER — CLINICAL SUPPORT (OUTPATIENT)
Dept: REHABILITATION | Facility: OTHER | Age: 67
End: 2022-01-24
Attending: PHYSICIAN ASSISTANT
Payer: COMMERCIAL

## 2022-01-24 DIAGNOSIS — R29.898 DECREASED STRENGTH OF TRUNK AND BACK: Primary | ICD-10-CM

## 2022-01-24 PROCEDURE — 97110 THERAPEUTIC EXERCISES: CPT

## 2022-01-24 NOTE — PROGRESS NOTES
"  [facility] Physical Therapy Treatment      Name: Dasia Henning  Clinic Number: [id number]    Therapy Diagnosis:   Encounter Diagnosis   Name Primary?    Decreased strength of trunk and back Yes     Physician: Mandie Gray PA-C    Visit Date: 2022       Physician Orders: PT Eval and Treat   Medical Diagnosis from Referral:M43.16 (ICD-10-CM) - Spondylolisthesis of lumbar region   Evaluation Date: 2021  Authorization Period Expiration: 10/13/22  Plan of Care Expiration: 22  Reassessment Due: 21  Visit # / Visits authorized: 3/ 20 juan [email or web address]      Time In: 815  Time Out: 915  Total Billable Time: 55 minutes     Precautions: Standard /L spiral ankle fracture(surgical intervention)  Pattern of pain determined: 1PEP    Subjective   Dasia reports that she was "trapped" because she got COVID while she was out of state in [place] and only recently returned to [facility]; she did not have and could not find a number to contact the clinic and cancel her visits accordingly.      Patient reports tolerating previous visit fair  Patient reports their pain to be 3/10 on a 0-10 scale with 0 being no pain and 10 being the worst pain imaginable.  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  Leisure: tennis/walking /works out with a  2x/wk    Pts goals: "get stronger"    Objective   Baseline Isometric Testing on Med X equipment: Testing administered by PT  Date of testin21  ROM 12-42 deg   Max Peak Torque 168    Min Peak Torque 72    Flex/Ext Ratio 2.3:1   % above  normative data 19   17% below at 12 degrees    Outcomes:  Initial score: 45%  Visit 5 score:  Goal:      Treatment    Pt was instructed in and performed the following:     Dasia received therapeutic exercises to develop/improved posture, cardiovascular endurance, muscular endurance, lumbar/cervical ROM, strength and muscular endurance for 55 minutes including the " "following exercises:     LTR x10  Extension in Lying 10x  BKTC c/ ball x10 5" hold  Seated sciatic nerve glide  Piriformis stretch figure 4 stretch 30" hold x2  PPT x10 5"hold      [facility] - Short 1/24/2022   Visit Number 3   VAS Pain Rating 3   Treadmill Time (in min.) 5   Flexion in Lying 10   Femur Restraint -   Top Dead Center -   Counterweight -   Lumbar Flexion -   Lumbar Extension -   Lumbar Peak Torque -   Lumbar Weight 65   Repetitions 15   Rating of Perceived Exertion 4           Dasia completed peripheral muscle strengthening which included 1 set of 15-20 repetitions at a slow, controlled 10-13 second per rep pace focused on strengthening supporting musculature for improved body mechanics and functional mobility.  Pt and therapist focused on proper form during treatment to ensure optimal strengthening of each targeted muscle group.  Machines were utilized including torso rotation, leg extension, leg curl, chest press, upright row. Tricep extension, bicep curl, leg press, and hip abduction added visit 3    Dasia received the following manual therapy techniques: MET for SIJ gapping and L innominate upslip/post rotation for 5 min in supine.         Home Exercises Provided and Patient Education Provided   Home exercises include:Extension in Lying 10x, BKTC c/ ball, Seated sciatic nerve glide  Cardio program:Visit 5  Lifting education date:Visit 11  Lumbar roll: not yet    Education provided:   - Pacing and technique for MedX strengthening, adjustments to POC      Written Home Exercises Provided: Patient instructed to cont prior HEP.  Exercises were reviewed and Dasia was able to demonstrate them prior to the end of the session.  Dasia demonstrated good  understanding of the education provided.     See EMR under Patient Instructions for exercises provided 12/13/2021.      Assessment   Pt presents to third healthy back visit after extended absence due to leaving town and then getting trapped by " COVID quarantine and weather, reporting no increased soreness. Again able to demo HEP with min VC for form. MedX resistance was decreased to 65 ft/lb due to both prolonged absence and severe difficulty last session, and this time she was able to complete 15 repetitions with an RPE of 4/10. Will continue to progress as tolerated. Also completed full peripheral strengthening exercises this session.      Patient is making good progress towards established goals.  Pt will continue to benefit from skilled outpatient physical therapy to address the deficits stated in the impairment chart, provide pt/family education and to maximize pt's level of independence in the home and community environment.     Anticipated Barriers for therapy: none  Pt's spiritual, cultural and educational needs considered and pt agreeable to plan of care and goals as stated below:           GOALS: Pt is in agreement with the following goals.     Short term goals:  6 weeks or 10 visits   1.  Pt will demonstrate increased lumbar ROM by at least 3 degrees from the initial ROM value with improvements noted in functional ROM and ability to perform ADLs.  (approp and ongoing)  2.  Pt will demonstrate increased MedX average isometric strength value  by 10% from initial test resulting in improved ability to perform bending, lifting, and carrying activities safely, confidently.  (approp and ongoing)  3.  Patient report a reduction in worst pain score by 1-2 points for improved tolerance for lifting laundry out of dryer.  (approp and ongoing)  4.  Pt able to perform HEP correctly with minimal cueing or supervision from therapist to encourage independent management of symptoms. (approp and ongoing)        Long term goals: 10 weeks or 20 visits   1. Pt will demonstrate increased lumbar ROM by at least 9 degrees from initial ROM value, resulting in improved ability to perform functional fwd bending while standing and sitting. (approp and ongoing)  2. Pt will  demonstrate increased MedX average isometric strength value  by 30% from initial test resulting in improved ability to perform bending, lifting, and carrying activities safely, confidently.  (approp and ongoing)  3. Pt to demonstrate ability to independently control and reduce their pain through posture positioning and mechanical movements throughout a typical day.  (approp and ongoing)  4.  Pt will demonstrate reduced pain and improved functional outcomes as reported on the FOTO by reaching a limitation score of < or = 40% or less in order to demonstrate subjective improvement in pt's condition.    (approp and ongoing)  5. Pt will demonstrate independence with the HEP at discharge  (approp and ongoing)  6.  Pt(patient goal)will be able to play tennis without LBP  (approp and ongoing)      Plan   Continue with established Plan of Care towards established PT goals.     Michael Ayala, PT  1/24/2022     Is This A New Presentation, Or A Follow-Up?: Growth What Type Of Note Output Would You Prefer (Optional)?: Standard Output How Severe Is Your Skin Lesion?: mild Has Your Skin Lesion Been Treated?: not been treated

## 2022-01-28 ENCOUNTER — CLINICAL SUPPORT (OUTPATIENT)
Dept: REHABILITATION | Facility: OTHER | Age: 67
End: 2022-01-28
Attending: PHYSICIAN ASSISTANT
Payer: COMMERCIAL

## 2022-01-28 DIAGNOSIS — R29.898 DECREASED STRENGTH OF TRUNK AND BACK: Primary | ICD-10-CM

## 2022-01-28 PROCEDURE — 97110 THERAPEUTIC EXERCISES: CPT

## 2022-01-28 NOTE — PROGRESS NOTES
"  [facility] Physical Therapy Treatment      Name: Dasia Henning  Clinic Number: [id number]    Therapy Diagnosis:   Encounter Diagnosis   Name Primary?    Decreased strength of trunk and back Yes     Physician: Mandie Gray PA-C    Visit Date: 2022       Physician Orders: PT Eval and Treat   Medical Diagnosis from Referral:M43.16 (ICD-10-CM) - Spondylolisthesis of lumbar region   Evaluation Date: 2021  Authorization Period Expiration: 10/13/22  Plan of Care Expiration: 22  Reassessment Due: 21  Visit # / Visits authorized: 3/ 20 jayson      Time In: 815  Time Out: 915  Total Billable Time: 55 minutes     Precautions: Standard /L spiral ankle fracture(surgical intervention)  Pattern of pain determined: 1PEP    Subjective   Dasia reports that she is feeling alright in terms of her back pain but she is very tired today because she has yet to get a really good sleep since she's gotten back in town, just very fatigued today with some muscle tightness.      Patient reports tolerating previous visit fair  Patient reports their pain to be 3/10 on a 0-10 scale with 0 being no pain and 10 being the worst pain imaginable.  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  Leisure: tennis/walking /works out with a  2x/wk    Pts goals: "get stronger"    Objective   Baseline Isometric Testing on Med X equipment: Testing administered by PT  Date of testin21  ROM 12-42 deg   Max Peak Torque 168    Min Peak Torque 72    Flex/Ext Ratio 2.3:1   % above  normative data 19   17% below at 12 degrees    Outcomes:  Initial score: 45%  Visit 5 score:  Goal:      Treatment    Pt was instructed in and performed the following:     Dasia received therapeutic exercises to develop/improved posture, cardiovascular endurance, muscular endurance, lumbar/cervical ROM, strength and muscular endurance for 55 minutes including the following " "exercises:     LTR x10  Extension in Lying 10x  BKTC c/ ball x10 5" hold  Piriformis stretch figure 4 stretch 30" hold x2  PPT x10 5"hold   Open books x 10  Sidelying QL/lat stretch c/overpressure 2x10" ea (QL spasm on R side)     [facility] - Short 1/28/2022   Visit Number 4   VAS Pain Rating 2   Treadmill Time (in min.) -   Time 5   Flexion in Lying 10   Femur Restraint -   Top Dead Center -   Counterweight -   Lumbar Flexion -   Lumbar Extension -   Lumbar Peak Torque -   Lumbar Weight 65   Repetitions 15   Rating of Perceived Exertion 6           Dasia completed peripheral muscle strengthening which included 1 set of 15-20 repetitions at a slow, controlled 10-13 second per rep pace focused on strengthening supporting musculature for improved body mechanics and functional mobility.  Pt and therapist focused on proper form during treatment to ensure optimal strengthening of each targeted muscle group.  Machines were utilized including torso rotation, leg extension, leg curl, chest press, upright row. Tricep extension, bicep curl, leg press, and hip abduction added visit 3    Dasia received the following manual therapy techniques: nil.         Home Exercises Provided and Patient Education Provided   Home exercises include:Extension in Lying 10x, BKTC c/ ball, Seated sciatic nerve glide  Cardio program:Visit 5  Lifting education date:Visit 11  Lumbar roll: not yet    Education provided:   - Pacing and technique for MedX strengthening, adjustments to POC      Written Home Exercises Provided: Patient instructed to cont prior HEP.  Exercises were reviewed and Dasia was able to demonstrate them prior to the end of the session.  Dasia demonstrated good  understanding of the education provided.     See EMR under Patient Instructions for exercises provided 12/13/2021.      Assessment   Dasia comes to therapy reporting significant fatigue as she has not really slept well all week since getting home from [place], " though she was still fully participative in therapy. MedX resistance was continued at 65 ft/lb and she was again only able to complete 15 repetitions, this time with an RPE of 6/10. Will continue to progress as tolerated. Also completed full peripheral strengthening exercises this session. Continue to monitor overall energy levels as she continues to recover from COVID episode.      Patient is making good progress towards established goals.  Pt will continue to benefit from skilled outpatient physical therapy to address the deficits stated in the impairment chart, provide pt/family education and to maximize pt's level of independence in the home and community environment.     Anticipated Barriers for therapy: none  Pt's spiritual, cultural and educational needs considered and pt agreeable to plan of care and goals as stated below:           GOALS: Pt is in agreement with the following goals.     Short term goals:  6 weeks or 10 visits   1.  Pt will demonstrate increased lumbar ROM by at least 3 degrees from the initial ROM value with improvements noted in functional ROM and ability to perform ADLs.  (approp and ongoing)  2.  Pt will demonstrate increased MedX average isometric strength value  by 10% from initial test resulting in improved ability to perform bending, lifting, and carrying activities safely, confidently.  (approp and ongoing)  3.  Patient report a reduction in worst pain score by 1-2 points for improved tolerance for lifting laundry out of dryer.  (approp and ongoing)  4.  Pt able to perform HEP correctly with minimal cueing or supervision from therapist to encourage independent management of symptoms. (approp and ongoing)        Long term goals: 10 weeks or 20 visits   1. Pt will demonstrate increased lumbar ROM by at least 9 degrees from initial ROM value, resulting in improved ability to perform functional fwd bending while standing and sitting. (approp and ongoing)  2. Pt will demonstrate  increased MedX average isometric strength value  by 30% from initial test resulting in improved ability to perform bending, lifting, and carrying activities safely, confidently.  (approp and ongoing)  3. Pt to demonstrate ability to independently control and reduce their pain through posture positioning and mechanical movements throughout a typical day.  (approp and ongoing)  4.  Pt will demonstrate reduced pain and improved functional outcomes as reported on the FOTO by reaching a limitation score of < or = 40% or less in order to demonstrate subjective improvement in pt's condition.    (approp and ongoing)  5. Pt will demonstrate independence with the HEP at discharge  (approp and ongoing)  6.  Pt(patient goal)will be able to play tennis without LBP  (approp and ongoing)      Plan   Continue with established Plan of Care towards established PT goals.     Michael Ayala, PT  1/28/2022

## 2022-02-01 ENCOUNTER — CLINICAL SUPPORT (OUTPATIENT)
Dept: REHABILITATION | Facility: OTHER | Age: 67
End: 2022-02-01
Attending: PHYSICIAN ASSISTANT
Payer: COMMERCIAL

## 2022-02-01 DIAGNOSIS — R29.898 DECREASED STRENGTH OF TRUNK AND BACK: Primary | ICD-10-CM

## 2022-02-01 PROCEDURE — 97110 THERAPEUTIC EXERCISES: CPT

## 2022-02-01 NOTE — PROGRESS NOTES
"  [facility]      Name: Dasia Henning  Clinic Number: [id number]    Therapy Diagnosis:   Encounter Diagnosis   Name Primary?    Decreased strength of trunk and back Yes     Physician: Mandie Gray PA-C    Visit Date: 2022       Physician Orders: PT Eval and Treat   Medical Diagnosis from Referral:M43.16 (ICD-10-CM) - Spondylolisthesis of lumbar region   Evaluation Date: 2021  Authorization Period Expiration: 10/13/22  Plan of Care Expiration: 22  Reassessment Due: 21  Visit # / Visits authorized:  juan [email or web address]      Time In: 4:15 pm  Time Out: 5:15 pm  Total Billable Time: 60 minutes     Precautions: Standard /L spiral ankle fracture(surgical intervention)  Pattern of pain determined: 1PEP    Subjective   Dasia reports "its getting better" Pt relays LB sx dec in intensity especially at EOD.  Pt report improved sleep quality over the last weekend.    Pt report working /c a  on days not at PT /c focus on whole body mobility and strength /c one x wk slow lifting for muscle endurance.        Patient reports tolerating previous visit well /c no c/o of excess discomfort.    Patient reports their pain to be 4/10 on a 0-10 scale with 0 being no pain and 10 being the worst pain imaginable.  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  Leisure: tennis/walking /works out with a  2x/wk    Pts goals: "get stronger"    Objective   Baseline Isometric Testing on Med X equipment: Testing administered by PT  Date of testin21  ROM 12-42 deg   Max Peak Torque 168    Min Peak Torque 72    Flex/Ext Ratio 2.3:1   % above  normative data 19   17% below at 12 degrees    Outcomes:  Initial score: 45%  Visit 6 score:  Goal:      Treatment    Pt was instructed in and performed the following:     Dasia received therapeutic exercises to develop/improved posture, cardiovascular endurance, muscular " "endurance, lumbar/cervical ROM, strength and muscular endurance for 45 minutes including the following exercises:     LTR x10  DKTC c/ ball x10 5" hold  Extension in Lying 10x inc range /c inc reps     PPT x10 tactile and verbal cue for TA activation  Bridges x 10       NP:   Piriformis stretch figure 4 stretch 30" hold x2  Open books x 10  Sidelying QL/lat stretch c/overpressure 2x10" ea (QL spasm on R side)     [facility] 2/1/2022   Visit Number 5   VAS Pain Rating 3   Treadmill Time (in min.) -   Time 5   Extension in Lying 10   Flexion in Lying 10   Femur Restraint -   Top Dead Center -   Counterweight -   Lumbar Flexion 42   Lumbar Extension 6   Lumbar Peak Torque -   Min Torque -   Lumbar Weight 65   Repetitions 17   Rating of Perceived Exertion 6   Ice - Z Lie (in min.) 5         Dasia completed peripheral muscle strengthening which included 1 set of 15-20 repetitions at a slow, controlled 10-13 second per rep pace focused on strengthening supporting musculature for improved body mechanics and functional mobility.  Pt and therapist focused on proper form during treatment to ensure optimal strengthening of each targeted muscle group.  Machines were utilized including torso rotation, leg extension, leg curl, chest press, upright row. Tricep extension, bicep curl, leg press, and hip abduction added visit 3    Dasia received the following manual therapy techniques: nil.         Home Exercises Provided and Patient Education Provided   Home exercises include:  Extension in Lying 10x,   BKTC c/ ball,   Seated sciatic nerve glide  PPT (02/01/22)        Cardio program:Visit 5  Lifting education date:Visit 11  Lumbar roll: not yet    Education provided:   - Pacing and technique for MedX strengthening, adjustments to POC      Written Home Exercises Provided: Patient instructed to cont prior HEP.  Exercises were reviewed and Dasia was able to demonstrate them prior to the end of the session.  Dasia demonstrated " good  understanding of the education provided.     See EMR under Patient Instructions for exercises provided 12/13/2021.      Assessment     Pt perform prone press ups /c inc ext range indicating improved lumbar mobility.  However, pt need cue for TA activation /c PPT indicating core motor control deficit.  Pt advised to perform PPT in HEP for improved motor control.  Pt issued handout for inc participation.   Lumbar MedX weight maintained per pt tolerance last visit.   Also, ext range inc for mobility challenge. Today pt perform 17 reps at 6 RPE indicating improved strength and mobility.  However, recommend keeping weight to match strength challenge to max reps.     Pt issued walking program to promote cardiovascular health.  Pt verbalize intent to assess baseline weekly stepping program.     Plan to administer FOTO next visit.       Patient is making good progress towards established goals.  Pt will continue to benefit from skilled outpatient physical therapy to address the deficits stated in the impairment chart, provide pt/family education and to maximize pt's level of independence in the home and community environment.     Anticipated Barriers for therapy: none  Pt's spiritual, cultural and educational needs considered and pt agreeable to plan of care and goals as stated below:           GOALS: Pt is in agreement with the following goals.     Short term goals:  6 weeks or 10 visits   1.  Pt will demonstrate increased lumbar ROM by at least 3 degrees from the initial ROM value with improvements noted in functional ROM and ability to perform ADLs.  (approp and ongoing)  2.  Pt will demonstrate increased MedX average isometric strength value  by 10% from initial test resulting in improved ability to perform bending, lifting, and carrying activities safely, confidently.  (approp and ongoing)  3.  Patient report a reduction in worst pain score by 1-2 points for improved tolerance for lifting laundry out of dryer.   (approp and ongoing)  4.  Pt able to perform HEP correctly with minimal cueing or supervision from therapist to encourage independent management of symptoms. (approp and ongoing)        Long term goals: 10 weeks or 20 visits   1. Pt will demonstrate increased lumbar ROM by at least 9 degrees from initial ROM value, resulting in improved ability to perform functional fwd bending while standing and sitting. (approp and ongoing)  2. Pt will demonstrate increased MedX average isometric strength value  by 30% from initial test resulting in improved ability to perform bending, lifting, and carrying activities safely, confidently.  (approp and ongoing)  3. Pt to demonstrate ability to independently control and reduce their pain through posture positioning and mechanical movements throughout a typical day.  (approp and ongoing)  4.  Pt will demonstrate reduced pain and improved functional outcomes as reported on the FOTO by reaching a limitation score of < or = 40% or less in order to demonstrate subjective improvement in pt's condition.    (approp and ongoing)  5. Pt will demonstrate independence with the HEP at discharge  (approp and ongoing)  6.  Pt(patient goal)will be able to play tennis without LBP  (approp and ongoing)      Plan   Continue with established Plan of Care towards established PT goals.     Andrew Hess, PT  2/1/2022

## 2022-02-08 ENCOUNTER — CLINICAL SUPPORT (OUTPATIENT)
Dept: REHABILITATION | Facility: OTHER | Age: 67
End: 2022-02-08
Attending: PHYSICIAN ASSISTANT
Payer: COMMERCIAL

## 2022-02-08 DIAGNOSIS — R29.898 DECREASED STRENGTH OF TRUNK AND BACK: Primary | ICD-10-CM

## 2022-02-08 PROCEDURE — 97110 THERAPEUTIC EXERCISES: CPT

## 2022-02-08 NOTE — PROGRESS NOTES
"  [facility]      Name: Dasia Henning  Clinic Number: [id number]    Therapy Diagnosis:   Encounter Diagnosis   Name Primary?    Decreased strength of trunk and back Yes     Physician: Mandie Gray PA-C    Visit Date: 2022       Physician Orders: PT Eval and Treat   Medical Diagnosis from Referral:M43.16 (ICD-10-CM) - Spondylolisthesis of lumbar region   Evaluation Date: 2021  Authorization Period Expiration: 10/13/22  Plan of Care Expiration: 22  Visit # / Visits authorized:  jayson      Time In: 818  Time Out: 910  Total Billable Time: 53 minutes     Precautions: Standard /L spiral ankle fracture (surgical intervention)  Pattern of pain determined: 1PEP    Subjective   Dasia reports "I am feeling pretty good, about a 3-4/10 pain right now; takes me a while to get going and loosen up".       Patient reports tolerating previous visit well /c no c/o of excess discomfort.    Patient reports their pain to be 3- 4/10 on a 0-10 scale with 0 being no pain and 10 being the worst pain imaginable.  Pain Location: right back; and across the lower back.       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  Leisure: tennis/walking /works out with a  2x/wk    Pts goals: "get stronger"    Objective   Baseline Isometric Testing on Med X equipment: Testing administered by PT  Date of testin21  ROM 12-42 deg   Max Peak Torque 168    Min Peak Torque 72    Flex/Ext Ratio 2.3:1   % above  normative data 19   17% below at 12 degrees    Outcomes:  Initial score: 45%  Visit 6 score: 35%  Goal: 35%          Treatment    Pt was instructed in and performed the following:     Dasia received therapeutic exercises to develop/improved posture, cardiovascular endurance, muscular endurance, lumbar/cervical ROM, strength and muscular endurance for 53 minutes including the following exercises:     Aerobic exercise: Recumbent bike at level 4 for " "6 minutes  LTR x10  DKTC c/ ball x10 5" hold  Extension in Lying 10x inc range /c inc reps   PPT 10x5" ( tactile and verbal cue for TA activation)  Bridges 10x5''    [facility] 2/8/2022   Visit Number 6   VAS Pain Rating 3   Treadmill Time (in min.) -   Recumbent Bike Seat Pos. 7   Time 5   Extension in Lying 10   Flexion in Lying 10   Lumbar Weight 65   Repetitions 20   Rating of Perceived Exertion 4   Ice - Z Lie (in min.) 5       Not Performed today:  Piriformis stretch figure 4 stretch 30" hold x2  Open books x 10  Sidelying QL/lat stretch c/overpressure 2x10" ea (QL spasm on R side)     Dasia completed peripheral muscle strengthening which included 1 set of 15-20 repetitions at a slow, controlled 10-13 second per rep pace focused on strengthening supporting musculature for improved body mechanics and functional mobility.  Pt and therapist focused on proper form during treatment to ensure optimal strengthening of each targeted muscle group.  Machines were utilized including torso rotation, leg extension, leg curl, chest press, upright row. Tricep extension, bicep curl, leg press, and hip abduction added visit 3    Dasia received the following manual therapy techniques: nil.     Home Exercises Provided and Patient Education Provided   Home exercises include:  Extension in Lying 10x,   BKTC c/ ball,   Seated sciatic nerve glide  PPT (02/01/22)    Cardio program:Visit 5  Lifting education date:Visit 11  Lumbar roll: not yet    Education provided:   - Pacing and technique for MedX strengthening, adjustments to POC      Written Home Exercises Provided: Patient instructed to cont prior HEP.  Exercises were reviewed and Dasia was able to demonstrate them prior to the end of the session.  Dasia demonstrated good  understanding of the education provided.     See EMR under Patient Instructions for exercises provided 12/13/2021.      Assessment     Dasia returned to PT reporting feeling pretty good today. She felt " a little too hot with warm ups and stretches, and had to step out to change to a t-shirt. She had no other new complaints. She resumed previous lumbar weight of 65#, completing max reps at RPE of 4/10 (previously at 6/10). Progress per HB protocol at next visit with a 5% increase on lumbar weight.  Patient is progressing with  FOTO goal at this time, with 37% impairment.     Patient is making good progress towards established goals.  Pt will continue to benefit from skilled outpatient physical therapy to address the deficits stated in the impairment chart, provide pt/family education and to maximize pt's level of independence in the home and community environment.     Anticipated Barriers for therapy: none  Pt's spiritual, cultural and educational needs considered and pt agreeable to plan of care and goals as stated below:     GOALS: Pt is in agreement with the following goals.     Short term goals:  6 weeks or 10 visits   1.  Pt will demonstrate increased lumbar ROM by at least 3 degrees from the initial ROM value with improvements noted in functional ROM and ability to perform ADLs.  (approp and ongoing)  2.  Pt will demonstrate increased MedX average isometric strength value  by 10% from initial test resulting in improved ability to perform bending, lifting, and carrying activities safely, confidently.  (approp and ongoing)  3.  Patient report a reduction in worst pain score by 1-2 points for improved tolerance for lifting laundry out of dryer.  (approp and ongoing)  4.  Pt able to perform HEP correctly with minimal cueing or supervision from therapist to encourage independent management of symptoms. (approp and ongoing)        Long term goals: 10 weeks or 20 visits   1. Pt will demonstrate increased lumbar ROM by at least 9 degrees from initial ROM value, resulting in improved ability to perform functional fwd bending while standing and sitting. (approp and ongoing)  2. Pt will demonstrate increased MedX average  isometric strength value  by 30% from initial test resulting in improved ability to perform bending, lifting, and carrying activities safely, confidently.  (approp and ongoing)  3. Pt to demonstrate ability to independently control and reduce their pain through posture positioning and mechanical movements throughout a typical day.  (approp and ongoing)  4.  Pt will demonstrate reduced pain and improved functional outcomes as reported on the FOTO by reaching a limitation score of < or = 40% or less in order to demonstrate subjective improvement in pt's condition.    (Progressed to 37% impaired; continue to progress to least limitation)  5. Pt will demonstrate independence with the HEP at discharge  (approp and ongoing)  6.  Pt(patient goal)will be able to play tennis without LBP  (approp and ongoing)      Plan   Continue with established Plan of Care towards established PT goals.     Twyla Vargas, PT  2/8/2022

## 2022-02-10 ENCOUNTER — CLINICAL SUPPORT (OUTPATIENT)
Dept: REHABILITATION | Facility: OTHER | Age: 67
End: 2022-02-10
Attending: PHYSICIAN ASSISTANT
Payer: COMMERCIAL

## 2022-02-10 DIAGNOSIS — R29.898 DECREASED STRENGTH OF TRUNK AND BACK: Primary | ICD-10-CM

## 2022-02-10 PROCEDURE — 97110 THERAPEUTIC EXERCISES: CPT | Mod: CQ

## 2022-02-10 NOTE — PROGRESS NOTES
"  [facility]      Name: Dasia Henning  Clinic Number: [id number]    Therapy Diagnosis:   Encounter Diagnosis   Name Primary?    Decreased strength of trunk and back Yes     Physician: Mandie Gray PA-C    Visit Date: 2/10/2022       Physician Orders: PT Eval and Treat   Medical Diagnosis from Referral:M43.16 (ICD-10-CM) - Spondylolisthesis of lumbar region   Evaluation Date: 2021  Authorization Period Expiration: 10/13/22  Plan of Care Expiration: 22  Visit # / Visits authorized:  jayson      Time In: 3:00  Time Out: 3:48  Total Billable Time: 43 minutes     Precautions: Standard /L spiral ankle fracture (surgical intervention)  Pattern of pain determined: 1PEP    Subjective   Dasia reports "feeling pretty good" today and rating her lower back pain as a 3/10.  She experienced slight increase in soreness following last visit.       Patient reports tolerating previous visit well /c no c/o of excess discomfort.    Patient reports their pain to be 3/10 on a 0-10 scale with 0 being no pain and 10 being the worst pain imaginable.  Pain Location: right back; and across the lower back.       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  Leisure: tennis/walking /works out with a  2x/wk    Pts goals: "get stronger"    Objective   Baseline Isometric Testing on Med X equipment: Testing administered by PT  Date of testin21  ROM 12-42 deg   Max Peak Torque 168    Min Peak Torque 72    Flex/Ext Ratio 2.3:1   % above  normative data 19   17% below at 12 degrees    Outcomes:  Initial score: 45%  Visit 6 score: 35%  Goal: 35%          Treatment    Pt was instructed in and performed the following:     Dasia received therapeutic exercises to develop/improved posture, cardiovascular endurance, muscular endurance, lumbar/cervical ROM, strength and muscular endurance for 43 minutes including the following exercises:     Aerobic exercise: " "Recumbent bike at level 4 for 6 minutes  LTR x10  DKTC c/ ball x10 5" hold  Extension in Lying 10x   PPT 10x5" ( tactile and verbal cue for TA activation)  +90/90 heel taps x10   +sl clamshells  Bridges 10x5''     [facility] 2/10/2022   Visit Number 7   VAS Pain Rating 3   Treadmill Time (in min.) 5   Recumbent Bike - Seat Pos. -   Time -   Extension in Lying 10   Flexion in Lying 10   Femur Restraint -   Top Dead Center -   Counterweight -   Lumbar Flexion -   Lumbar Extension -   Lumbar Peak Torque -   Lumbar Weight 69   Repetitions 15   Rating of Perceived Exertion 4       Not Performed today:  Piriformis stretch figure 4 stretch 30" hold x2  Open books x 10  Sidelying QL/lat stretch c/overpressure 2x10" ea (QL spasm on R side)     Dasia completed peripheral muscle strengthening which included 1 set of 15-20 repetitions at a slow, controlled 10-13 second per rep pace focused on strengthening supporting musculature for improved body mechanics and functional mobility.  Pt and therapist focused on proper form during treatment to ensure optimal strengthening of each targeted muscle group.  Machines were utilized including torso rotation, leg extension, leg curl, chest press, upright row. Tricep extension, bicep curl, leg press, and hip abduction added visit 3    Dasia received the following manual therapy techniques: nil.     Home Exercises Provided and Patient Education Provided   Home exercises include:  Extension in Lying 10x,   BKTC c/ ball,   Seated sciatic nerve glide  PPT (02/01/22)    Cardio program:Visit 5  Lifting education date:Visit 11  Lumbar roll: not yet    Education provided:   - Pacing and technique for MedX strengthening, adjustments to POC      Written Home Exercises Provided: Patient instructed to cont prior HEP.  Exercises were reviewed and Dasia was able to demonstrate them prior to the end of the session.  Dasia demonstrated good  understanding of the education provided. "     See EMR under Patient Instructions for exercises provided 12/13/2021.      Assessment   Dasia returned reporting feeling pretty good upon arrival to therapy today.  Treatment progressed by adding 90/90 heel taps and sl clamshells to further challenge core and hip musculature.  She tolerated progressions well reporting no increase in pain and appropriate difficulty.  Medx resistance increased to 69#.  She completed 15 reps at a RPE of 4/10.  Continue to progress per HB protocol.  Pt requested Dry Needling next visit.    Patient is making good progress towards established goals.  Pt will continue to benefit from skilled outpatient physical therapy to address the deficits stated in the impairment chart, provide pt/family education and to maximize pt's level of independence in the home and community environment.     Anticipated Barriers for therapy: none  Pt's spiritual, cultural and educational needs considered and pt agreeable to plan of care and goals as stated below:     GOALS: Pt is in agreement with the following goals.     Short term goals:  6 weeks or 10 visits   1.  Pt will demonstrate increased lumbar ROM by at least 3 degrees from the initial ROM value with improvements noted in functional ROM and ability to perform ADLs.  (approp and ongoing)  2.  Pt will demonstrate increased MedX average isometric strength value  by 10% from initial test resulting in improved ability to perform bending, lifting, and carrying activities safely, confidently.  (approp and ongoing)  3.  Patient report a reduction in worst pain score by 1-2 points for improved tolerance for lifting laundry out of dryer.  (approp and ongoing)  4.  Pt able to perform HEP correctly with minimal cueing or supervision from therapist to encourage independent management of symptoms. (approp and ongoing)        Long term goals: 10 weeks or 20 visits   1. Pt will demonstrate increased lumbar ROM by at least 9 degrees from initial ROM value,  resulting in improved ability to perform functional fwd bending while standing and sitting. (approp and ongoing)  2. Pt will demonstrate increased MedX average isometric strength value  by 30% from initial test resulting in improved ability to perform bending, lifting, and carrying activities safely, confidently.  (approp and ongoing)  3. Pt to demonstrate ability to independently control and reduce their pain through posture positioning and mechanical movements throughout a typical day.  (approp and ongoing)  4.  Pt will demonstrate reduced pain and improved functional outcomes as reported on the FOTO by reaching a limitation score of < or = 40% or less in order to demonstrate subjective improvement in pt's condition.    (Progressed to 37% impaired; continue to progress to least limitation)  5. Pt will demonstrate independence with the HEP at discharge  (approp and ongoing)  6.  Pt(patient goal)will be able to play tennis without LBP  (approp and ongoing)      Plan   Continue with established Plan of Care towards established PT goals.     Niranjan Martell, PTA  2/10/2022

## 2022-02-16 ENCOUNTER — CLINICAL SUPPORT (OUTPATIENT)
Dept: REHABILITATION | Facility: OTHER | Age: 67
End: 2022-02-16
Attending: PHYSICIAN ASSISTANT
Payer: COMMERCIAL

## 2022-02-16 DIAGNOSIS — R29.898 DECREASED STRENGTH OF TRUNK AND BACK: Primary | ICD-10-CM

## 2022-02-16 PROCEDURE — 97110 THERAPEUTIC EXERCISES: CPT

## 2022-02-16 NOTE — PROGRESS NOTES
"  [facility]      Name: Dasia Henning  Clinic Number: [id number]    Therapy Diagnosis:   Encounter Diagnosis   Name Primary?    Decreased strength of trunk and back Yes     Physician: Mandie Gray PA-C    Visit Date: 2022       Physician Orders: PT Eval and Treat   Medical Diagnosis from Referral:M43.16 (ICD-10-CM) - Spondylolisthesis of lumbar region   Evaluation Date: 2021  Authorization Period Expiration: 10/13/22  Plan of Care Expiration: 22  Visit # / Visits authorized:  jayson      Time In: 3:30  Time Out: 4:15  Total Billable Time: 43 minutes     Precautions: Standard /L spiral ankle fracture (surgical intervention)  Pattern of pain determined: 1PEP    Subjective   Dasia reports slight increased pain level today.        Patient reports tolerating previous visit well /c no c/o of excess discomfort.    Patient reports their pain to be 3/10 on a 0-10 scale with 0 being no pain and 10 being the worst pain imaginable.  Pain Location: right back; and across the lower back.       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  Leisure: tennis/walking /works out with a  2x/wk    Pts goals: "get stronger"    Objective   Baseline Isometric Testing on Med X equipment: Testing administered by PT  Date of testin21  ROM 12-42 deg   Max Peak Torque 168    Min Peak Torque 72    Flex/Ext Ratio 2.3:1   % above  normative data 19   17% below at 12 degrees    Outcomes:  Initial score: 45%  Visit 6 score: 35%  Goal: 35%          Treatment    Pt was instructed in and performed the following:     Dasia received therapeutic exercises to develop/improved posture, cardiovascular endurance, muscular endurance, lumbar/cervical ROM, strength and muscular endurance for 43 minutes including the following exercises:     Aerobic exercise: Recumbent bike at level 4 for 6 minutes  DKTC c/ ball x15 5" hold  Extension in Lying 10x - PT " "overpressure  PPT 15x5" ( tactile and verbal cue for TA activation)  LTR x10  90/90 heel taps x10   sl clamshells  Bridges 10x5''   Open books x15  Piriformis stretch 2x 20" hold     [facility] 2/16/2022   Visit Number 8   VAS Pain Rating 4   Treadmill Time (in min.) -   Recumbent Bike Seat Pos. -   Time 5   Extension in Lying 10   Flexion in Lying 10   Femur Restraint -   Top Dead Center -   Counterweight -   Lumbar Flexion -   Lumbar Extension -   Lumbar Peak Torque -   Min Torque -   Lumbar Weight 69   Repetitions 18   Rating of Perceived Exertion 4   Ice - Z Lie (in min.) 5       Not Performed today:  Piriformis stretch figure 4 stretch 30" hold x2  Open books x 10  Sidelying QL/lat stretch c/overpressure 2x10" ea (QL spasm on R side)     Dasia completed peripheral muscle strengthening which included 1 set of 15-20 repetitions at a slow, controlled 10-13 second per rep pace focused on strengthening supporting musculature for improved body mechanics and functional mobility.  Pt and therapist focused on proper form during treatment to ensure optimal strengthening of each targeted muscle group.  Machines were utilized including torso rotation, leg extension, leg curl, chest press, upright row. Tricep extension, bicep curl, leg press, and hip abduction added visit 3    Dasia received the following manual therapy techniques: nil.     Home Exercises Provided and Patient Education Provided   Home exercises include:  Extension in Lying 10x,   BKTC c/ ball,   Seated sciatic nerve glide  PPT (02/01/22)    Cardio program:Visit 5  Lifting education date:Visit 11  Lumbar roll: not yet    Education provided:   - Pacing and technique for MedX strengthening, adjustments to POC      Written Home Exercises Provided: Patient instructed to cont prior HEP.  Exercises were reviewed and Dasia was able to demonstrate them prior to the end of the session.  Dasia demonstrated good  understanding of the education provided. "     See EMR under Patient Instructions for exercises provided 12/13/2021.      Assessment   Dasia returned reporting feeling increased pain this visit. Good response to prone extension with overpressure, improvement in LTR after completing. Discussed DN with pt today, explained that after needling is completed ice is not recommended. This visit she decided to receive ice after therapy and forego the needling. Medx resistance maintained at 69#.  She completed 18 reps at a RPE of 4/10.  Continue to progress per HB protocol.      Patient is making good progress towards established goals.  Pt will continue to benefit from skilled outpatient physical therapy to address the deficits stated in the impairment chart, provide pt/family education and to maximize pt's level of independence in the home and community environment.     Anticipated Barriers for therapy: none  Pt's spiritual, cultural and educational needs considered and pt agreeable to plan of care and goals as stated below:     GOALS: Pt is in agreement with the following goals.     Short term goals:  6 weeks or 10 visits   1.  Pt will demonstrate increased lumbar ROM by at least 3 degrees from the initial ROM value with improvements noted in functional ROM and ability to perform ADLs.  (approp and ongoing)  2.  Pt will demonstrate increased MedX average isometric strength value  by 10% from initial test resulting in improved ability to perform bending, lifting, and carrying activities safely, confidently.  (approp and ongoing)  3.  Patient report a reduction in worst pain score by 1-2 points for improved tolerance for lifting laundry out of dryer.  (approp and ongoing)  4.  Pt able to perform HEP correctly with minimal cueing or supervision from therapist to encourage independent management of symptoms. (approp and ongoing)        Long term goals: 10 weeks or 20 visits   1. Pt will demonstrate increased lumbar ROM by at least 9 degrees from initial ROM value,  resulting in improved ability to perform functional fwd bending while standing and sitting. (approp and ongoing)  2. Pt will demonstrate increased MedX average isometric strength value  by 30% from initial test resulting in improved ability to perform bending, lifting, and carrying activities safely, confidently.  (approp and ongoing)  3. Pt to demonstrate ability to independently control and reduce their pain through posture positioning and mechanical movements throughout a typical day.  (approp and ongoing)  4.  Pt will demonstrate reduced pain and improved functional outcomes as reported on the FOTO by reaching a limitation score of < or = 40% or less in order to demonstrate subjective improvement in pt's condition.    (Progressed to 37% impaired; continue to progress to least limitation)  5. Pt will demonstrate independence with the HEP at discharge  (approp and ongoing)  6.  Pt(patient goal)will be able to play tennis without LBP  (approp and ongoing)      Plan   Continue with established Plan of Care towards established PT goals.     Lorenzo French, PT  2/16/2022

## 2022-02-17 PROBLEM — F41.9 ANXIETY AND DEPRESSION: Status: ACTIVE | Noted: 2022-02-17

## 2022-02-17 PROBLEM — M85.89 OSTEOPENIA OF MULTIPLE SITES: Status: ACTIVE | Noted: 2022-02-17

## 2022-02-17 PROBLEM — F32.A ANXIETY AND DEPRESSION: Status: ACTIVE | Noted: 2022-02-17

## 2022-02-18 ENCOUNTER — CLINICAL SUPPORT (OUTPATIENT)
Dept: REHABILITATION | Facility: OTHER | Age: 67
End: 2022-02-18
Attending: PHYSICIAN ASSISTANT
Payer: COMMERCIAL

## 2022-02-18 DIAGNOSIS — R29.898 DECREASED STRENGTH OF TRUNK AND BACK: Primary | ICD-10-CM

## 2022-02-18 PROCEDURE — 97110 THERAPEUTIC EXERCISES: CPT

## 2022-02-18 NOTE — PROGRESS NOTES
"  [facility]      Name: Dasia Henning  Clinic Number: [id number]    Therapy Diagnosis:   Encounter Diagnosis   Name Primary?    Decreased strength of trunk and back Yes     Physician: Mandie Gray PA-C    Visit Date: 2022       Physician Orders: PT Eval and Treat   Medical Diagnosis from Referral:M43.16 (ICD-10-CM) - Spondylolisthesis of lumbar region   Evaluation Date: 2021  Authorization Period Expiration: 10/13/22  Plan of Care Expiration: 3/18/22  Visit # / Visits authorized:  jayson      Time In: 1:45 pm  Time Out: 2:45 pm  Total Billable Time: 60 minutes     Precautions: Standard /L spiral ankle fracture (surgical intervention)  Pattern of pain determined: 1PEP    Subjective   Dasia reports cont sx presentation as "good day/bad day" arrangement.  Pt /c "good day" today.   Pt report obtain lumbar roll and notes improved seated tolerance.  Pt report upcoming MD physical /c labs may serve to calm her anxiety about potential vitamin deficiency leading to her present discomfort.     Patient reports tolerating previous visit well /c no c/o of excess discomfort.    Patient reports their pain to be 2/10 on a 0-10 scale with 0 being no pain and 10 being the worst pain imaginable.  Pain Location: right back; and across the lower back.       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  Leisure: tennis/walking /works out with a  2x/wk    Pts goals: "get stronger"    Objective   Baseline Isometric Testing on Med X equipment: Testing administered by PT  Date of testin21  ROM 12-42 deg   Max Peak Torque 168    Min Peak Torque 72    Flex/Ext Ratio 2.3:1   % above  normative data 19   17% below at 12 degrees    Outcomes:  Initial score: 45%  Visit 6 score: 37%  Goal: 35%          Treatment    Pt was instructed in and performed the following:     Dasia received therapeutic exercises to develop/improved posture, cardiovascular " "endurance, muscular endurance, lumbar/cervical ROM, strength and muscular endurance for 45 minutes including the following exercises:     Aerobic exercise: Recumbent bike at level 4 for 5 minutes    LTR x10  Extension in Lying 10x cue for pt breathing for OP  Lumbar extension to wall x 10     MET for R ant tilt 3 x 5 sec hold  PPT 15x5" tactile and verbal cue for TA activation, dec BLE use   Bridges /c R TB x 10, x 5 /c arms crossed   90/90 heel taps x10 tactile cue for cont pelvic tilt    Open books x10 B       NP:   SL clamshells  DKTC c/ ball x15 5" hold  Piriformis stretch figure 4 stretch 30" hold x2  Open books x 10  Sidelying QL/lat stretch c/overpressure 2x10" ea (QL spasm on R side)    [facility] 2/18/2022   Visit Number 9   VAS Pain Rating 3   Treadmill Time (in min.) -   Recumbent Bike Seat Pos. -   Time 5   Extension in Lying 10   Extension in Standing 10   Flexion in Lying -   Femur Restraint -   Top Dead Center -   Counterweight -   Lumbar Flexion 45   Lumbar Extension 3   Lumbar Peak Torque -   Min Torque -   Lumbar Weight 69   Repetitions 20   Rating of Perceived Exertion 3   Ice - Z Lie (in min.) 5          Dasia completed peripheral muscle strengthening which included 1 set of 15-20 repetitions at a slow, controlled 10-13 second per rep pace focused on strengthening supporting musculature for improved body mechanics and functional mobility.  Pt and therapist focused on proper form during treatment to ensure optimal strengthening of each targeted muscle group.  Machines were utilized including torso rotation, leg extension, leg curl, chest press, upright row. Tricep extension, bicep curl, leg press, and hip abduction added visit 3    Dasia received the following manual therapy techniques: nil.     Home Exercises Provided and Patient Education Provided   Home exercises include:  LTR  DKTC   Extension in Lying x10    PPT (02/01/22)  Bridge    Cardio program: Visit 5  Lifting education date: " Visit 11  Lumbar roll: obtained and uses in home chair     Education provided:   - Pacing and technique for MedX strengthening, adjustments to POC      Written Home Exercises Provided: Patient instructed to cont prior HEP.  Exercises were reviewed and Dasia was able to demonstrate them prior to the end of the session.  Dasia demonstrated good  understanding of the education provided.     See EMR under Patient Instructions for exercises provided 12/13/2021.      Assessment     Pt note L side discomfort inc /c bed mobility(transitioning supine to prone) and notes it is similar to pain that was resolved /c MET tx.  This observation indicate sig improved body awareness and accurate performance of MET /c subsequent dec L sided discomfort indicate improved sx self management.  However, pt cont need sig cue for TA activation /c PPT indicating cont core motor control deficit.  Pt improved /c cueing, therefore, progressed bridges /c dec UE stability.  Pt perform /s inc discomfort indicating appropriateness of cont progressions.    Lumbar MedX weight maintained per pt tolerance last visit.  Also inc both flex and ext range for mobility challenge. Today pt perform 20 reps at 3 RPE indicating improved strength and mobilty.  Progress as tolerated. .    Plan MedX IM testing next visit per HB protocol.    Modified MedX peripheral exercises to allow her to make another appt.      Patient is making good progress towards established goals.  Pt will continue to benefit from skilled outpatient physical therapy to address the deficits stated in the impairment chart, provide pt/family education and to maximize pt's level of independence in the home and community environment.     Anticipated Barriers for therapy: none  Pt's spiritual, cultural and educational needs considered and pt agreeable to plan of care and goals as stated below:     GOALS: Pt is in agreement with the following goals.     Short term goals:  6 weeks or 10 visits   1.   Pt will demonstrate increased lumbar ROM by at least 3 degrees from the initial ROM value with improvements noted in functional ROM and ability to perform ADLs.  (approp and ongoing)  2.  Pt will demonstrate increased MedX average isometric strength value  by 10% from initial test resulting in improved ability to perform bending, lifting, and carrying activities safely, confidently.  (approp and ongoing)  3.  Patient report a reduction in worst pain score by 1-2 points for improved tolerance for lifting laundry out of dryer.  (approp and ongoing)  4.  Pt able to perform HEP correctly with minimal cueing or supervision from therapist to encourage independent management of symptoms. (approp and ongoing)        Long term goals: 10 weeks or 20 visits   1. Pt will demonstrate increased lumbar ROM by at least 9 degrees from initial ROM value, resulting in improved ability to perform functional fwd bending while standing and sitting. (approp and ongoing)  2. Pt will demonstrate increased MedX average isometric strength value  by 30% from initial test resulting in improved ability to perform bending, lifting, and carrying activities safely, confidently.  (approp and ongoing)  3. Pt to demonstrate ability to independently control and reduce their pain through posture positioning and mechanical movements throughout a typical day.  (approp and ongoing)  4.  Pt will demonstrate reduced pain and improved functional outcomes as reported on the FOTO by reaching a limitation score of < or = 40% or less in order to demonstrate subjective improvement in pt's condition.    (Progressed to 37% impaired; continue to progress to least limitation)  5. Pt will demonstrate independence with the HEP at discharge  (approp and ongoing)  6.  Pt(patient goal)will be able to play tennis without LBP  (approp and ongoing)      Plan   Continue with established Plan of Care towards established PT goals.     Andrew Hess, PT  2/18/2022

## 2022-02-24 ENCOUNTER — CLINICAL SUPPORT (OUTPATIENT)
Dept: REHABILITATION | Facility: OTHER | Age: 67
End: 2022-02-24
Attending: PHYSICIAN ASSISTANT
Payer: COMMERCIAL

## 2022-02-24 DIAGNOSIS — M54.50 CHRONIC RIGHT-SIDED LOW BACK PAIN WITHOUT SCIATICA: ICD-10-CM

## 2022-02-24 DIAGNOSIS — G89.29 CHRONIC RIGHT-SIDED LOW BACK PAIN WITHOUT SCIATICA: ICD-10-CM

## 2022-02-24 DIAGNOSIS — R29.898 DECREASED STRENGTH OF TRUNK AND BACK: Primary | ICD-10-CM

## 2022-02-24 PROCEDURE — 97110 THERAPEUTIC EXERCISES: CPT

## 2022-02-24 NOTE — PROGRESS NOTES
"  [facility]      Name: Dasia Henning  Clinic Number: [id number]    Therapy Diagnosis:   Encounter Diagnoses   Name Primary?    Decreased strength of trunk and back Yes    Chronic right-sided low back pain without sciatica      Physician: Mandie Gray PA-C    Visit Date: 2022       Physician Orders: PT Eval and Treat   Medical Diagnosis from Referral:M43.16 (ICD-10-CM) - Spondylolisthesis of lumbar region   Evaluation Date: 2021  Authorization Period Expiration: 10/13/22  Plan of Care Expiration: 3/18/22  Visit # / Visits authorized: 10/ 20 jayson      Time In: 1415  Time Out: 1505  Total Billable Time: 50 minutes     Precautions: Standard /L spiral ankle fracture (surgical intervention)  Pattern of pain determined: 1PEP    Subjective   Dasia arrives to PT without new complaints, and no pain.     Pt report obtain lumbar roll and notes improved seated tolerance.  Pt report upcoming MD physical /c labs may serve to calm her anxiety about potential vitamin deficiency leading to her present discomfort.     Patient reports tolerating previous visit well /c no c/o of excess discomfort.    Patient reports their pain to be 0/10 on a 0-10 scale with 0 being no pain and 10 being the worst pain imaginable.  Pain Location: right back; and across the lower back.       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  Leisure: tennis/walking /works out with a  2x/wk    Pts goals: "get stronger"    Objective     Baseline Isometric Testing on Med X equipment: Testing administered by PT  Date of testin21  ROM 12-42 deg   Max Peak Torque 168    Min Peak Torque 72    Flex/Ext Ratio 2.3:1   % above  normative data 19   17% below at 12 degrees      [facility] Isometric Testing on Med X equipment: Testing administered by PT  Date of testin22  ROM 3-45 deg   Max Peak Torque 163   Min Peak Torque 73   Flex/Ext Ratio    Total ROM gain from " "eval  12 degrees     % average IM strength regression from eval 3% worse; but  14% gain at 12 deg point.    % above  normative data 12      *Curve appeared much organized, more linear   Outcomes:  Initial score: 45%  Visit 6 score: 37%  Goal: 35%      Treatment    Pt was instructed in and performed the following:     Dasia received therapeutic exercises to develop/improved posture, cardiovascular endurance, muscular endurance, lumbar/cervical ROM, strength and muscular endurance for 50 minutes including the following exercises:     Aerobic exercise: Treadmill at 2/3 mph for 5 minutes                               Recumbent bike at level 4 for 5 minutes-NP    LTR x10  Open books x10 B   PPT 15x5" tactile and verbal cue for TA activation, dec BLE use   90/90 heel taps x10 tactile cue for cont pelvic tilt  Bridges /c RTB x 15, x 5 /c arms crossed   Extension in Lying 10x cue for pt breathing for OP  Lumbar extension to wall x 10     [facility] 2/24/2022   Visit Number 10   VAS Pain Rating 0   Treadmill Time (in min.) 5   Speed 2.3   Extension in Lying 10   Extension in Standing 10   Lumbar Flexion 45   Lumbar Extension 3   Lumbar Peak Torque 163   Min Torque 73   Lumbar Weight 45       Not Performed:  MET for R ant tilt 3 x 5 sec hold  SL clamshells  DKTC c/ ball x15 5" hold  Piriformis stretch figure 4 stretch 30" hold x2  Open books x 10  Sidelying QL/lat stretch c/overpressure 2x10" ea (QL spasm on R side)     Dasia completed peripheral muscle strengthening which included 1 set of 15-20 repetitions at a slow, controlled 10-13 second per rep pace focused on strengthening supporting musculature for improved body mechanics and functional mobility.  Pt and therapist focused on proper form during treatment to ensure optimal strengthening of each targeted muscle group.  Machines were utilized including torso rotation, leg extension, leg curl, chest press, upright row. Tricep extension, bicep curl, leg press, and " hip abduction added visit 3    Dasia received the following manual therapy techniques: nil.     Home Exercises Provided and Patient Education Provided   Home exercises include:  LTR  DKTC   Extension in Lying x10    PPT (02/01/22)  Bridge    Cardio program: Visit 5  Lifting education date: Visit 11  Lumbar roll: obtained and uses in home chair     Education provided:   - Pacing and technique for MedX strengthening, adjustments to POC      Written Home Exercises Provided: Patient instructed to cont prior HEP.  Exercises were reviewed and Dasia was able to demonstrate them prior to the end of the session.  Dasia demonstrated good  understanding of the education provided.     See EMR under Patient Instructions for exercises provided 12/13/2021.      Assessment     Dasia  has attended 10 visits at [facility] which included MD evaluation, PT evaluation with isometric testing, and physical therapy treatment including HEP instruction, education, aerobic work, dynamic strengthening on med ex equipment for the spine, and whole body strengthening on med ex equipment with increasing weight loads.  Patient  is demonstrating increased ability to reduce symptoms, improved posture, improved   ROM, and improved   strength as follows:    -Improved posture,  using lumbar roll  -Improved lumbar ROM,  initially on med ex test 12-42 deg and   currently 3-45  Deg (a 12 deg ROM gain since eval).  -Improved strength at each test point on lumbar med ex IM strength with   12% above average normal; pt displayed a uniform and linear graph compared to first test. There were improvement noted with Reduced pain.   -Initial outcome tool score 45% and current outcome tool score  37% (at best so far) indicating reduced pain and improved function      Patient is making good progress towards established goals.  Pt will continue to benefit from skilled outpatient physical therapy to address the deficits stated in the impairment chart, provide  pt/family education and to maximize pt's level of independence in the home and community environment.     Anticipated Barriers for therapy: none  Pt's spiritual, cultural and educational needs considered and pt agreeable to plan of care and goals as stated below:     GOALS: Pt is in agreement with the following goals.     Short term goals:  6 weeks or 10 visits   1.  Pt will demonstrate increased lumbar ROM by at least 3 degrees from the initial ROM value with improvements noted in functional ROM and ability to perform ADLs.  MET 2/24/22  2.  Pt will demonstrate increased MedX average isometric strength value  by 10% from initial test resulting in improved ability to perform bending, lifting, and carrying activities safely, confidently.  (approp and ongoing)  3.  Patient report a reduction in worst pain score by 1-2 points for improved tolerance for lifting laundry out of dryer.  MET 2/24/22  4.  Pt able to perform HEP correctly with minimal cueing or supervision from therapist to encourage independent management of symptoms. MET 2/24/22        Long term goals: 10 weeks or 20 visits   1. Pt will demonstrate increased lumbar ROM by at least 9 degrees from initial ROM value, resulting in improved ability to perform functional fwd bending while standing and sitting. MET 2/24/22  2. Pt will demonstrate increased MedX average isometric strength value  by 30% from initial test resulting in improved ability to perform bending, lifting, and carrying activities safely, confidently.  (approp and ongoing)  3. Pt to demonstrate ability to independently control and reduce their pain through posture positioning and mechanical movements throughout a typical day.  (approp and ongoing)  4.  Pt will demonstrate reduced pain and improved functional outcomes as reported on the FOTO by reaching a limitation score of < or = 40% or less in order to demonstrate subjective improvement in pt's condition.    (Progressed to 37% impaired;  continue to progress to least limitation)  5. Pt will demonstrate independence with the HEP at discharge  (approp and ongoing)  6.  Pt(patient goal)will be able to play tennis without LBP  (approp and ongoing)      Plan   Continue with established Plan of Care towards established PT goals.     Twyla Vargas, PT  2/24/2022

## 2022-03-02 ENCOUNTER — CLINICAL SUPPORT (OUTPATIENT)
Dept: REHABILITATION | Facility: OTHER | Age: 67
End: 2022-03-02
Attending: PHYSICIAN ASSISTANT
Payer: COMMERCIAL

## 2022-03-02 DIAGNOSIS — R29.898 DECREASED STRENGTH OF TRUNK AND BACK: Primary | ICD-10-CM

## 2022-03-02 PROCEDURE — 97110 THERAPEUTIC EXERCISES: CPT

## 2022-03-02 NOTE — PROGRESS NOTES
"  [facility]      Name: Dasia Henning  Clinic Number: [id number]    Therapy Diagnosis:   Encounter Diagnosis   Name Primary?    Decreased strength of trunk and back Yes     Physician: Mandie Gray PA-C    Visit Date: 3/2/2022       Physician Orders: PT Eval and Treat   Medical Diagnosis from Referral:M43.16 (ICD-10-CM) - Spondylolisthesis of lumbar region   Evaluation Date: 2021  Authorization Period Expiration: 10/13/22  Plan of Care Expiration: 3/18/22  Visit # / Visits authorized:  jayson (complete lifting next visit)     Time In: 10:20  Time Out: 11:10  Total Billable Time: 50 minutes     Precautions: Standard /L spiral ankle fracture (surgical intervention)  Pattern of pain determined: 1PEP    Subjective   Dasia arrives to PT with new abdominal pain since evaluation; pain is on left side, slightly worse when standing. She reports she is a little more stiff this visit 2/2 walking.      Patient reports tolerating previous visit well /c no c/o of excess discomfort.    Patient reports their pain to be 0/10 on a 0-10 scale with 0 being no pain and 10 being the worst pain imaginable.  Pain Location: right back; and across the lower back.       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  Leisure: tennis/walking /works out with a  2x/wk    Pts goals: "get stronger"    Objective     Baseline Isometric Testing on Med X equipment: Testing administered by PT  Date of testin21  ROM 12-42 deg   Max Peak Torque 168    Min Peak Torque 72    Flex/Ext Ratio 2.3:1   % above  normative data 19   17% below at 12 degrees      Midpoint Isometric Testing on Med X equipment: Testing administered by PT  Date of testin22  ROM 3-45 deg   Max Peak Torque 163   Min Peak Torque 73   Flex/Ext Ratio    Total ROM gain from eval  12 degrees     % average IM strength regression from eval 3% worse; but  14% gain at 12 deg point.    % above " " normative data 12      *Curve appeared much organized, more linear   Outcomes:  Initial score: 45%  Visit 6 score: 37%  Goal: 35%      Treatment    Pt was instructed in and performed the following:     Dasia received therapeutic exercises to develop/improved posture, cardiovascular endurance, muscular endurance, lumbar/cervical ROM, strength and muscular endurance for 50 minutes including the following exercises:     Aerobic exercise: Treadmill at 2/3 mph for 5 minutes                               Recumbent bike at level 4 for 5 minutes-NP    LTR x10  Open books x10 B   PPT 15x5" tactile and verbal cue for TA activation, dec BLE use   90/90 heel taps x10 tactile cue for cont pelvic tilt  Bridges /c RTB x 15, x 5 /c arms crossed   Extension in Lying 10x cue for pt breathing for OP  Lumbar extension to wall x 10   TA activation with PB 10x x10"  [facility] 3/2/2022   Visit Number 11   VAS Pain Rating 4   Treadmill Time (in min.) -   Speed -   Recumbent Bike Seat Pos. -   Time -   Extension in Lying 10   Extension in Standing 10   Flexion in Lying -   Femur Restraint -   Top Dead Center -   Counterweight -   Lumbar Flexion -   Lumbar Extension -   Lumbar Peak Torque -   Min Torque -   Lumbar Weight 65   Repetitions 15   Rating of Perceived Exertion 3   Ice - Z Lie (in min.) 5       Not Performed:  MET for R ant tilt 3 x 5 sec hold  SL clamshells  DKTC c/ ball x15 5" hold  Piriformis stretch figure 4 stretch 30" hold x2  Open books x 10  Sidelying QL/lat stretch c/overpressure 2x10" ea (QL spasm on R side)     Dasia completed peripheral muscle strengthening which included 1 set of 15-20 repetitions at a slow, controlled 10-13 second per rep pace focused on strengthening supporting musculature for improved body mechanics and functional mobility.  Pt and therapist focused on proper form during treatment to ensure optimal strengthening of each targeted muscle group.  Machines were utilized including torso " rotation, leg extension, leg curl, chest press, upright row. Tricep extension, bicep curl, leg press, and hip abduction added visit 3    Dasia received the following manual therapy techniques: nil.     Home Exercises Provided and Patient Education Provided   Home exercises include:  LTR  DKTC   Extension in Lying x10    PPT (02/01/22)  Bridge    Cardio program: Visit 5  Lifting education date: Visit 11  Lumbar roll: obtained and uses in home chair     Education provided:   - Pacing and technique for MedX strengthening, adjustments to POC      Written Home Exercises Provided: Patient instructed to cont prior HEP.  Exercises were reviewed and Dasia was able to demonstrate them prior to the end of the session.  Dasia demonstrated good  understanding of the education provided.     See EMR under Patient Instructions for exercises provided 12/13/2021.      Assessment   Dasia reports to PT this visit with new abdominal pain that occurred after last visit. She states the pain is deep and lower towards groin. Pain is worse with standing and twisting; better with TA activation. With prone extensions, feels like she is getting a good stretch. She mentions recent urinary leakage and asks if this could be related to her ab pain. Discussed that the pelvic floor and transverse abdominus being closely related and that if she has concerns to discuss with her MD. She reports that increased weight of 72# being too hard to ocmplete. Weight was decreased to 65# she was able to complete 15 reps. When asked about exertion. She reports that it was only moderately difficult despite asking to stop at 15 reps. Next visit increase weight to 69# and attempt 20 reps. Next visit go over lifting protocol.         Patient is making good progress towards established goals.  Pt will continue to benefit from skilled outpatient physical therapy to address the deficits stated in the impairment chart, provide pt/family education and to maximize pt's  level of independence in the home and community environment.     Anticipated Barriers for therapy: none  Pt's spiritual, cultural and educational needs considered and pt agreeable to plan of care and goals as stated below:     GOALS: Pt is in agreement with the following goals.     Short term goals:  6 weeks or 10 visits   1.  Pt will demonstrate increased lumbar ROM by at least 3 degrees from the initial ROM value with improvements noted in functional ROM and ability to perform ADLs.  MET 2/24/22  2.  Pt will demonstrate increased MedX average isometric strength value  by 10% from initial test resulting in improved ability to perform bending, lifting, and carrying activities safely, confidently.  (approp and ongoing)  3.  Patient report a reduction in worst pain score by 1-2 points for improved tolerance for lifting laundry out of dryer.  MET 2/24/22  4.  Pt able to perform HEP correctly with minimal cueing or supervision from therapist to encourage independent management of symptoms. MET 2/24/22        Long term goals: 10 weeks or 20 visits   1. Pt will demonstrate increased lumbar ROM by at least 9 degrees from initial ROM value, resulting in improved ability to perform functional fwd bending while standing and sitting. MET 2/24/22  2. Pt will demonstrate increased MedX average isometric strength value  by 30% from initial test resulting in improved ability to perform bending, lifting, and carrying activities safely, confidently.  (approp and ongoing)  3. Pt to demonstrate ability to independently control and reduce their pain through posture positioning and mechanical movements throughout a typical day.  (approp and ongoing)  4.  Pt will demonstrate reduced pain and improved functional outcomes as reported on the FOTO by reaching a limitation score of < or = 40% or less in order to demonstrate subjective improvement in pt's condition.    (Progressed to 37% impaired; continue to progress to least limitation)  5.  Pt will demonstrate independence with the HEP at discharge  (approp and ongoing)  6.  Pt(patient goal)will be able to play tennis without LBP  (approp and ongoing)      Plan   Continue with established Plan of Care towards established PT goals.     Lorenzo French, PT  3/2/2022

## 2022-03-04 ENCOUNTER — HOSPITAL ENCOUNTER (OUTPATIENT)
Dept: RADIOLOGY | Facility: OTHER | Age: 67
Discharge: HOME | End: 2022-03-04
Attending: INTERNAL MEDICINE
Payer: COMMERCIAL

## 2022-03-04 DIAGNOSIS — R10.32 LEFT LOWER QUADRANT PAIN: ICD-10-CM

## 2022-03-04 PROCEDURE — 74177 CT ABD & PELVIS W/CONTRAST: CPT | Mod: TC

## 2022-03-04 PROCEDURE — 74177 CT ABDOMEN PELVIS WITH CONTRAST: ICD-10-PCS | Mod: 26,,, | Performed by: RADIOLOGY

## 2022-03-04 PROCEDURE — 74177 CT ABD & PELVIS W/CONTRAST: CPT | Mod: 26,,, | Performed by: RADIOLOGY

## 2022-03-04 PROCEDURE — 25500020 PHARM REV CODE 255: Performed by: INTERNAL MEDICINE

## 2022-03-04 PROCEDURE — A9698 NON-RAD CONTRAST MATERIALNOC: HCPCS | Performed by: INTERNAL MEDICINE

## 2022-03-04 RX ADMIN — IOHEXOL 1000 ML: 12 SOLUTION ORAL at 03:03

## 2022-03-04 RX ADMIN — IOHEXOL 100 ML: 350 INJECTION, SOLUTION INTRAVENOUS at 04:03

## 2022-03-07 PROBLEM — K57.92 DIVERTICULITIS: Status: ACTIVE | Noted: 2022-03-07

## 2022-03-08 ENCOUNTER — CLINICAL SUPPORT (OUTPATIENT)
Dept: REHABILITATION | Facility: OTHER | Age: 67
End: 2022-03-08
Attending: PHYSICIAN ASSISTANT
Payer: COMMERCIAL

## 2022-03-08 DIAGNOSIS — R29.898 DECREASED STRENGTH OF TRUNK AND BACK: Primary | ICD-10-CM

## 2022-03-08 PROCEDURE — 97110 THERAPEUTIC EXERCISES: CPT

## 2022-03-08 NOTE — PROGRESS NOTES
"  [facility]      Name: Dasia Henning  Clinic Number: [id number]    Therapy Diagnosis:   Encounter Diagnosis   Name Primary?    Decreased strength of trunk and back Yes     Physician: Mandie Gray PA-C    Visit Date: 3/8/2022       Physician Orders: PT Eval and Treat   Medical Diagnosis from Referral:M43.16 (ICD-10-CM) - Spondylolisthesis of lumbar region   Evaluation Date: 2021  Authorization Period Expiration: 10/13/22  Plan of Care Expiration: 3/18/22  Visit # / Visits authorized:  jayson      Time In: 8:35  Time Out: 9:25  Total Billable Time: 50 minutes     Precautions: Standard /L spiral ankle fracture (surgical intervention)  Pattern of pain determined: 1PEP    Subjective   Dasia arrives to PT reporting that her previous abdominal pain turned out to be diverticulitis which is being treated, today reports some R lateral hip.low back type pain.      Patient reports tolerating previous visit well /c no c/o of excess discomfort.    Patient reports their pain to be 3/10 on a 0-10 scale with 0 being no pain and 10 being the worst pain imaginable.  Pain Location: right back; and across the lower back.       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  Leisure: tennis/walking /works out with a  2x/wk    Pts goals: "get stronger"    Objective     Baseline Isometric Testing on Med X equipment: Testing administered by PT  Date of testin21  ROM 12-42 deg   Max Peak Torque 168    Min Peak Torque 72    Flex/Ext Ratio 2.3:1   % above  normative data 19   17% below at 12 degrees      Midpoint Isometric Testing on Med X equipment: Testing administered by PT  Date of testin22  ROM 3-45 deg   Max Peak Torque 163   Min Peak Torque 73   Flex/Ext Ratio    Total ROM gain from eval  12 degrees     % below average  % loss from initial 3  14%   % above  normative data 12    *14% gain at 12 deg  *Curve appeared much organized, " "more linear       Outcomes: FOTO  Initial score: 45%  Visit 6 score: 37%  Goal: 35%      Treatment    Pt was instructed in and performed the following:     Dasia received therapeutic exercises to develop/improved posture, cardiovascular endurance, muscular endurance, lumbar/cervical ROM, strength and muscular endurance for 50 minutes including the following exercises:     LTR x10  Open books x10 B   Extension in Lying 10x cue for pt breathing for OP  Lumbar extension to wall x 10   TA activation with PB 10x x10"     [facility] 3/2/2022   Visit Number 11   VAS Pain Rating 4   Treadmill Time (in min.) -   Speed -   Recumbent Bike Seat Pos. -   Time -   Extension in Lying 10   Extension in Standing 10   Flexion in Lying -   Femur Restraint -   Top Dead Center -   Counterweight -   Lumbar Flexion -   Lumbar Extension -   Lumbar Peak Torque -   Min Torque -   Lumbar Weight 65   Repetitions 15   Rating of Perceived Exertion 3   Ice - Z Lie (in min.) 5       Not Performed:  MET for R ant tilt 3 x 5 sec hold  SL clamshells  DKTC c/ ball x15 5" hold  Piriformis stretch figure 4 stretch 30" hold x2  Sidelying QL/lat stretch c/overpressure 2x10" ea (QL spasm on R side)  PPT 15x5" tactile and verbal cue for TA activation, dec BLE use   90/90 heel taps x10 tactile cue for cont pelvic tilt  Bridges /c RTB x 15, x 5 /c arms crossed      Dasia completed peripheral muscle strengthening which included 1 set of 15-20 repetitions at a slow, controlled 10-13 second per rep pace focused on strengthening supporting musculature for improved body mechanics and functional mobility.  Pt and therapist focused on proper form during treatment to ensure optimal strengthening of each targeted muscle group.  Machines were utilized including torso rotation, leg extension, leg curl, chest press, upright row. Tricep extension, bicep curl, leg press, and hip abduction added visit 3    Dasia received the following manual therapy techniques: STM " and TpR to distal erector spinae on R as well as R lateral piriformis insertion for 8 min.     Home Exercises Provided and Patient Education Provided   Home exercises include:  LTR  DKTC   Extension in Lying x10    PPT (02/01/22)  Bridge    Cardio program: Visit 5  Lifting education date: Visit 11  Lumbar roll: obtained and uses in home chair     Education provided:   - Do's and Don'ts of lifting      Written Home Exercises Provided: Patient instructed to cont prior HEP.  Exercises were reviewed and Dasia was able to demonstrate them prior to the end of the session.  Dasia demonstrated good  understanding of the education provided.     See EMR under Patient Instructions for exercises provided 12/13/2021.      Assessment   Dasia reports to PT and determined that abdominal pain was diverticulitis that is getting addressed by her MD. She reported some lateral piriformis pain that reduced after STM but did not fully resolve. She was able to perform MedX at 65# again this session with her completing 20 reps at an RPE of 3/10, will hope to progress again next session.        Patient is making good progress towards established goals.  Pt will continue to benefit from skilled outpatient physical therapy to address the deficits stated in the impairment chart, provide pt/family education and to maximize pt's level of independence in the home and community environment.     Anticipated Barriers for therapy: none  Pt's spiritual, cultural and educational needs considered and pt agreeable to plan of care and goals as stated below:     GOALS: Pt is in agreement with the following goals.     Short term goals:  6 weeks or 10 visits   1.  Pt will demonstrate increased lumbar ROM by at least 3 degrees from the initial ROM value with improvements noted in functional ROM and ability to perform ADLs.  MET 2/24/22  2.  Pt will demonstrate increased MedX average isometric strength value  by 10% from initial test resulting in improved  ability to perform bending, lifting, and carrying activities safely, confidently.  (approp and ongoing)  3.  Patient report a reduction in worst pain score by 1-2 points for improved tolerance for lifting laundry out of dryer.  MET 2/24/22  4.  Pt able to perform HEP correctly with minimal cueing or supervision from therapist to encourage independent management of symptoms. MET 2/24/22        Long term goals: 10 weeks or 20 visits   1. Pt will demonstrate increased lumbar ROM by at least 9 degrees from initial ROM value, resulting in improved ability to perform functional fwd bending while standing and sitting. MET 2/24/22  2. Pt will demonstrate increased MedX average isometric strength value  by 30% from initial test resulting in improved ability to perform bending, lifting, and carrying activities safely, confidently.  (approp and ongoing)  3. Pt to demonstrate ability to independently control and reduce their pain through posture positioning and mechanical movements throughout a typical day.  (approp and ongoing)  4.  Pt will demonstrate reduced pain and improved functional outcomes as reported on the FOTO by reaching a limitation score of < or = 40% or less in order to demonstrate subjective improvement in pt's condition.    (Progressed to 37% impaired; continue to progress to least limitation)  5. Pt will demonstrate independence with the HEP at discharge  (approp and ongoing)  6.  Pt(patient goal)will be able to play tennis without LBP  (approp and ongoing)      Plan   Continue with established Plan of Care towards established PT goals.     Michael Ayala, PT  3/8/2022

## 2022-03-10 ENCOUNTER — CLINICAL SUPPORT (OUTPATIENT)
Dept: REHABILITATION | Facility: OTHER | Age: 67
End: 2022-03-10
Attending: PHYSICIAN ASSISTANT
Payer: COMMERCIAL

## 2022-03-10 DIAGNOSIS — R29.898 DECREASED STRENGTH OF TRUNK AND BACK: Primary | ICD-10-CM

## 2022-03-10 PROCEDURE — 97110 THERAPEUTIC EXERCISES: CPT

## 2022-03-10 NOTE — PROGRESS NOTES
"  [facility]      Name: Dasia Henning  Clinic Number: [id number]    Therapy Diagnosis:   Encounter Diagnosis   Name Primary?    Decreased strength of trunk and back Yes     Physician: Mandie Gray PA-C    Visit Date: 3/10/2022       Physician Orders: PT Eval and Treat   Medical Diagnosis from Referral:M43.16 (ICD-10-CM) - Spondylolisthesis of lumbar region   Evaluation Date: 2021  Authorization Period Expiration: 10/13/22  Plan of Care Expiration: 3/18/22  Visit # / Visits authorized:  jayson      Time In: 3:30  Time Out: 4:25  Total Billable Time: 50 minutes     Precautions: Standard /L spiral ankle fracture (surgical intervention)  Pattern of pain determined: 1PEP    Subjective   Dasia arrives to PT reporting increased pain, though she thinks this is largely because she has had an event at the [employer] every night this week after having none for two years, so she is on her feet a lot more than usual.       Patient reports tolerating previous visit well /c no c/o of excess discomfort.    Patient reports their pain to be 4/10 on a 0-10 scale with 0 being no pain and 10 being the worst pain imaginable.  Pain Location: right back; and across the lower back.       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  Leisure: tennis/walking /works out with a  2x/wk    Pts goals: "get stronger"    Objective     Baseline Isometric Testing on Med X equipment: Testing administered by PT  Date of testin21  ROM 12-42 deg   Max Peak Torque 168    Min Peak Torque 72    Flex/Ext Ratio 2.3:1   % above  normative data 19   17% below at 12 degrees      Midpoint Isometric Testing on Med X equipment: Testing administered by PT  Date of testin22  ROM 3-45 deg   Max Peak Torque 163   Min Peak Torque 73   Flex/Ext Ratio    Total ROM gain from eval  12 degrees   % below average  % loss from initial 3  14%   % above  normative data 12  " "  *14% gain at 12 deg  *Curve appeared much organized, more linear       Outcomes: FOTO  Initial score: 45%  Visit 6 score: 37%  Goal: 35%      Treatment    Pt was instructed in and performed the following:     Dasia received therapeutic exercises to develop/improved posture, cardiovascular endurance, muscular endurance, lumbar/cervical ROM, strength and muscular endurance for 50 minutes including the following exercises:     LTR x10  PPT x 10  PPT + bridge x 15  Open books x10 B   EIS x 10   TA activation with PB x10, c/march x10   [facility] 3/10/2022   Visit Number 13   VAS Pain Rating 4   Treadmill Time (in min.) 5   Speed -   Recumbent Bike - Seat Pos. -   Time -   Extension in Lying -   Extension in Standing 10   Flexion in Lying 10   Femur Restraint -   Top Dead Center -   Counterweight -   Lumbar Flexion -   Lumbar Extension -   Lumbar Peak Torque -   Lumbar Weight 65   Repetitions 20   Rating of Perceived Exertion 4       Not Performed:  MET for R ant tilt 3 x 5 sec hold  SL clamshells  DKTC c/ ball x15 5" hold  Piriformis stretch figure 4 stretch 30" hold x2  Sidelying QL/lat stretch c/overpressure 2x10" ea (QL spasm on R side)  PPT 15x5" tactile and verbal cue for TA activation, dec BLE use   90/90 heel taps x10 tactile cue for cont pelvic tilt  Bridges /c RTB x 15, x 5 /c arms crossed      Dasia completed peripheral muscle strengthening which included 1 set of 15-20 repetitions at a slow, controlled 10-13 second per rep pace focused on strengthening supporting musculature for improved body mechanics and functional mobility.  Pt and therapist focused on proper form during treatment to ensure optimal strengthening of each targeted muscle group.  Machines were utilized including torso rotation, leg extension, leg curl, chest press, upright row. Tricep extension, bicep curl, leg press, and hip abduction added visit 3    Dasia received the following manual therapy techniques: STM and TpR to " distal erector spinae on R as well as R lateral piriformis insertion for 8 min.     Home Exercises Provided and Patient Education Provided   Home exercises include:  LTR  DKTC   Extension in Lying x10    PPT (02/01/22)  Bridge    Cardio program: Visit 5  Lifting education date: Visit 11  Lumbar roll: obtained and uses in home chair     Education provided:   - Do's and Don'ts of lifting      Written Home Exercises Provided: Patient instructed to cont prior HEP.  Exercises were reviewed and Dasia was able to demonstrate them prior to the end of the session.  Dasia demonstrated good  understanding of the education provided.     See EMR under Patient Instructions for exercises provided 12/13/2021.      Assessment   Dasia reports to PT with again continued R lateral piriformis pain that reduced after STM but did not fully resolve, but overall slightly less intense than during last session, which she is happy with considering how busy and active she has been this week. MedX resistance held due to overall report of symptom and soreness, but she was able to perform MedX at 65# this session with her completing 20 reps at an RPE of 4/10, will hope to progress again next session.        Patient is making good progress towards established goals.  Pt will continue to benefit from skilled outpatient physical therapy to address the deficits stated in the impairment chart, provide pt/family education and to maximize pt's level of independence in the home and community environment.     Anticipated Barriers for therapy: none  Pt's spiritual, cultural and educational needs considered and pt agreeable to plan of care and goals as stated below:     GOALS: Pt is in agreement with the following goals.     Short term goals:  6 weeks or 10 visits   1.  Pt will demonstrate increased lumbar ROM by at least 3 degrees from the initial ROM value with improvements noted in functional ROM and ability to perform ADLs.  MET 2/24/22  2.  Pt will  demonstrate increased MedX average isometric strength value  by 10% from initial test resulting in improved ability to perform bending, lifting, and carrying activities safely, confidently.  (approp and ongoing)  3.  Patient report a reduction in worst pain score by 1-2 points for improved tolerance for lifting laundry out of dryer.  MET 2/24/22  4.  Pt able to perform HEP correctly with minimal cueing or supervision from therapist to encourage independent management of symptoms. MET 2/24/22        Long term goals: 10 weeks or 20 visits   1. Pt will demonstrate increased lumbar ROM by at least 9 degrees from initial ROM value, resulting in improved ability to perform functional fwd bending while standing and sitting. MET 2/24/22  2. Pt will demonstrate increased MedX average isometric strength value  by 30% from initial test resulting in improved ability to perform bending, lifting, and carrying activities safely, confidently.  (approp and ongoing)  3. Pt to demonstrate ability to independently control and reduce their pain through posture positioning and mechanical movements throughout a typical day.  (approp and ongoing)  4.  Pt will demonstrate reduced pain and improved functional outcomes as reported on the FOTO by reaching a limitation score of < or = 40% or less in order to demonstrate subjective improvement in pt's condition.    (Progressed to 37% impaired; continue to progress to least limitation)  5. Pt will demonstrate independence with the HEP at discharge  (approp and ongoing)  6.  Pt(patient goal)will be able to play tennis without LBP  (approp and ongoing)      Plan   Continue with established Plan of Care towards established PT goals.     Michael Ayala, PT  3/10/2022

## 2022-03-18 ENCOUNTER — CLINICAL SUPPORT (OUTPATIENT)
Dept: REHABILITATION | Facility: OTHER | Age: 67
End: 2022-03-18
Attending: PHYSICIAN ASSISTANT
Payer: COMMERCIAL

## 2022-03-18 DIAGNOSIS — R29.898 DECREASED STRENGTH OF TRUNK AND BACK: Primary | ICD-10-CM

## 2022-03-18 PROCEDURE — 97110 THERAPEUTIC EXERCISES: CPT

## 2022-03-18 PROCEDURE — 97140 MANUAL THERAPY 1/> REGIONS: CPT

## 2022-03-18 NOTE — PROGRESS NOTES
"  [facility]      Name: Dasia Henning  Clinic Number: [id number]    Therapy Diagnosis:   Encounter Diagnosis   Name Primary?    Decreased strength of trunk and back Yes     Physician: Mandie Gray PA-C    Visit Date: 3/18/2022       Physician Orders: PT Eval and Treat   Medical Diagnosis from Referral:M43.16 (ICD-10-CM) - Spondylolisthesis of lumbar region   Evaluation Date: 2021  Authorization Period Expiration: 10/13/22  Plan of Care Expiration: 3/18/22  Visit # / Visits authorized:  jayson      Time In: 3:35  Time Out: 4:35  Total Billable Time: 55 minutes     Precautions: Standard /L spiral ankle fracture (surgical intervention)  Pattern of pain determined: 1PEP    Subjective   Dasia reports she is having some low back tightness and soreness for standing for 4-5 hours today.       Patient reports tolerating previous visit well /c no c/o of excess discomfort.    Patient reports their pain to be 4/10 on a 0-10 scale with 0 being no pain and 10 being the worst pain imaginable.  Pain Location: right back; and across the lower back.       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  Leisure: tennis/walking /works out with a  2x/wk    Pts goals: "get stronger"    Objective     Baseline Isometric Testing on Med X equipment: Testing administered by PT  Date of testin21  ROM 12-42 deg   Max Peak Torque 168    Min Peak Torque 72    Flex/Ext Ratio 2.3:1   % above  normative data 19   17% below at 12 degrees      Midpoint Isometric Testing on Med X equipment: Testing administered by PT  Date of testin22  ROM 3-45 deg   Max Peak Torque 163   Min Peak Torque 73   Flex/Ext Ratio    Total ROM gain from eval  12 degrees   % below average  % loss from initial 3  14%   % above  normative data 12    *14% gain at 12 deg  *Curve appeared much organized, more linear       Outcomes: FOTO  Initial score: 45%  Visit 6 score: " "37%  Goal: 35%      Treatment    Pt was instructed in and performed the following:     Dasia received therapeutic exercises to develop/improved posture, cardiovascular endurance, muscular endurance, lumbar/cervical ROM, strength and muscular endurance for 45 minutes including the following exercises:     LTR x10  EIL x10  Open books x10 B   DKTC c/ ball x10 5" hold  [facility] 3/18/2022   Visit Number 14   VAS Pain Rating 4   Treadmill Time (in min.) 5   Speed -   Recumbent Bike - Seat Pos. -   Time -   Extension in Lying -   Extension in Standing 10   Flexion in Lying 10   Femur Restraint -   Top Dead Center -   Counterweight -   Lumbar Flexion -   Lumbar Extension -   Lumbar Peak Torque -   Lumbar Weight 68   Repetitions 15   Rating of Perceived Exertion 5       Not Performed:  MET for R ant tilt 3 x 5 sec hold  SL clamshells  Piriformis stretch figure 4 stretch 30" hold x2  Sidelying QL/lat stretch c/overpressure 2x10" ea (QL spasm on R side)  PPT 15x5" tactile and verbal cue for TA activation, dec BLE use   90/90 heel taps x10 tactile cue for cont pelvic tilt  Bridges /c RTB x 15, x 5 /c arms crossed      Dasia completed peripheral muscle strengthening which included 1 set of 15-20 repetitions at a slow, controlled 10-13 second per rep pace focused on strengthening supporting musculature for improved body mechanics and functional mobility.  Pt and therapist focused on proper form during treatment to ensure optimal strengthening of each targeted muscle group.  Machines were utilized including torso rotation, leg extension, leg curl, chest press, upright row. Tricep extension, bicep curl, leg press, and hip abduction added visit 3    Dasia received the following manual therapy techniques: STM to bilateral lumbar erector spinae and QL and bilateral facet gapping for 10 min.     Home Exercises Provided and Patient Education Provided   Home exercises include:  LTR  DKTC   Extension in Lying x10  "   PPT (02/01/22)  Bridge    Cardio program: Visit 5  Lifting education date: Visit 11  Lumbar roll: obtained and uses in home chair     Education provided:   - Do's and Don'ts of lifting      Written Home Exercises Provided: Patient instructed to cont prior HEP.  Exercises were reviewed and Dasia was able to demonstrate them prior to the end of the session.  Dasia demonstrated good  understanding of the education provided.     See EMR under Patient Instructions for exercises provided 12/13/2021.      Assessment   Dasia presents today with slight increase in pain levels. Pt reporting decreased low back tightness and soreness post manual therapy and stretching. Medex resistance progressed to 68 ft/lbs and pt was able to complete 15 reps with 5/10 RPE. Continue to progress as tolerated.         Patient is making good progress towards established goals.  Pt will continue to benefit from skilled outpatient physical therapy to address the deficits stated in the impairment chart, provide pt/family education and to maximize pt's level of independence in the home and community environment.     Anticipated Barriers for therapy: none  Pt's spiritual, cultural and educational needs considered and pt agreeable to plan of care and goals as stated below:     GOALS: Pt is in agreement with the following goals.     Short term goals:  6 weeks or 10 visits   1.  Pt will demonstrate increased lumbar ROM by at least 3 degrees from the initial ROM value with improvements noted in functional ROM and ability to perform ADLs.  MET 2/24/22  2.  Pt will demonstrate increased MedX average isometric strength value  by 10% from initial test resulting in improved ability to perform bending, lifting, and carrying activities safely, confidently.  (approp and ongoing)  3.  Patient report a reduction in worst pain score by 1-2 points for improved tolerance for lifting laundry out of dryer.  MET 2/24/22  4.  Pt able to perform HEP correctly with  minimal cueing or supervision from therapist to encourage independent management of symptoms. MET 2/24/22        Long term goals: 10 weeks or 20 visits   1. Pt will demonstrate increased lumbar ROM by at least 9 degrees from initial ROM value, resulting in improved ability to perform functional fwd bending while standing and sitting. MET 2/24/22  2. Pt will demonstrate increased MedX average isometric strength value  by 30% from initial test resulting in improved ability to perform bending, lifting, and carrying activities safely, confidently.  (approp and ongoing)  3. Pt to demonstrate ability to independently control and reduce their pain through posture positioning and mechanical movements throughout a typical day.  (approp and ongoing)  4.  Pt will demonstrate reduced pain and improved functional outcomes as reported on the FOTO by reaching a limitation score of < or = 40% or less in order to demonstrate subjective improvement in pt's condition.    (Progressed to 37% impaired; continue to progress to least limitation)  5. Pt will demonstrate independence with the HEP at discharge  (approp and ongoing)  6.  Pt(patient goal)will be able to play tennis without LBP  (approp and ongoing)      Plan   Continue with established Plan of Care towards established PT goals.     Eddy Hurtado, PT  3/18/2022

## 2022-03-22 ENCOUNTER — CLINICAL SUPPORT (OUTPATIENT)
Dept: REHABILITATION | Facility: OTHER | Age: 67
End: 2022-03-22
Attending: PHYSICIAN ASSISTANT
Payer: COMMERCIAL

## 2022-03-22 DIAGNOSIS — R29.898 DECREASED STRENGTH OF TRUNK AND BACK: Primary | ICD-10-CM

## 2022-03-22 PROCEDURE — 97110 THERAPEUTIC EXERCISES: CPT

## 2022-03-22 NOTE — PROGRESS NOTES
"  [facility]      Name: Dasia Henning  Clinic Number: [id number]    Therapy Diagnosis:   Encounter Diagnosis   Name Primary?    Decreased strength of trunk and back Yes     Physician: Mandie Gray PA-C    Visit Date: 3/22/2022       Physician Orders: PT Eval and Treat   Medical Diagnosis from Referral:M43.16 (ICD-10-CM) - Spondylolisthesis of lumbar region   Evaluation Date: 2021  Authorization Period Expiration: 10/13/22  Plan of Care Expiration: 22 Extended POC  Visit # / Visits authorized: 15/ 20 jayson      Time In:825  Time Out: 920  Total Billable Time: 45minutes     Precautions: Standard /L spiral ankle fracture (surgical intervention)  Pattern of pain determined: 1PEP    Subjective   Dasia reports she is feeling really good    Patient reports tolerating previous visit well /c no c/o of excess discomfort.    Patient reports their pain to be 0/10 on a 0-10 scale with 0 being no pain and 10 being the worst pain imaginable.  Pain Location: right back; and across the lower back.       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  Leisure: tennis/walking /works out with a  2x/wk    Pts goals: "get stronger"    Objective     Baseline Isometric Testing on Med X equipment: Testing administered by PT  Date of testin21  ROM 12-42 deg   Max Peak Torque 168    Min Peak Torque 72    Flex/Ext Ratio 2.3:1   % above  normative data 19   17% below at 12 degrees      Midpoint Isometric Testing on Med X equipment: Testing administered by PT  Date of testin22  ROM 3-45 deg   Max Peak Torque 163   Min Peak Torque 73   Flex/Ext Ratio    Total ROM gain from eval  12 degrees   % below average  % loss from initial 3  14%   % above  normative data 12    *14% gain at 12 deg  *Curve appeared much organized, more linear       Outcomes: FOTO  Initial score: 45%  Visit 6 score: 37%  Goal: 35%      Treatment    Pt was instructed in and " "performed the following:     Dasia received therapeutic exercises to develop/improved posture, cardiovascular endurance, muscular endurance, lumbar/cervical ROM, strength and muscular endurance for 55 minutes including the following exercises:   [facility] 3/22/2022   Visit Number 15   VAS Pain Rating 3   Treadmill Time (in min.) 5   Speed -   Recumbent Bike Seat Pos. -   Time -   Extension in Lying -   Extension in Standing 10   Flexion in Lying 10   Femur Restraint -   Top Dead Center -   Counterweight -   Lumbar Flexion -   Lumbar Extension -   Lumbar Peak Torque -   Min Torque -   Lumbar Weight 68   Repetitions 20   Rating of Perceived Exertion 3   Ice - Z Lie (in min.) 5     LTR x10  EIL x10  Open books x10 B   DKTC c/ ball x10 5" hold        Not Performed:  MET for R ant tilt 3 x 5 sec hold  SL clamshells  Piriformis stretch figure 4 stretch 30" hold x2  Sidelying QL/lat stretch c/overpressure 2x10" ea (QL spasm on R side)  PPT 15x5" tactile and verbal cue for TA activation, dec BLE use   90/90 heel taps x10 tactile cue for cont pelvic tilt  Bridges /c RTB x 15, x 5 /c arms crossed      Dasia completed peripheral muscle strengthening which included 1 set of 15-20 repetitions at a slow, controlled 10-13 second per rep pace focused on strengthening supporting musculature for improved body mechanics and functional mobility.  Pt and therapist focused on proper form during treatment to ensure optimal strengthening of each targeted muscle group.  Machines were utilized including torso rotation, leg extension, leg curl, chest press, upright row. Tricep extension, bicep curl, leg press, and hip abduction added visit 3    Dasia received the following manual therapy techniques: STM to bilateral lumbar erector spinae and QLand bilateral facet gapping for 0 min.     Home Exercises Provided and Patient Education Provided   Home exercises include:  LTR  DKTC   Extension in Lying x10    PPT " (02/01/22)  Bridge    Cardio program: Visit 5  Lifting education date: Visit 11  Lumbar roll: obtained and uses in home chair     Education provided:   - Do's and Don'ts of lifting      Written Home Exercises Provided: Patient instructed to cont prior HEP.  Exercises were reviewed and Dasia was able to demonstrate them prior to the end of the session.  Dasia demonstrated good  understanding of the education provided.     See EMR under Patient Instructions for exercises provided 12/13/2021.      Assessment   Dasia reports overall she is feeling better.  Pt able to complete 20 reps at 68ft/lbs with 3/10 exertion level.  Inc 10% next visit. Review PPT for proper form.  Patient is making good progress towards established goals.  Pt will continue to benefit from skilled outpatient physical therapy to address the deficits stated in the impairment chart, provide pt/family education and to maximize pt's level of independence in the home and community environment.     Anticipated Barriers for therapy: none  Pt's spiritual, cultural and educational needs considered and pt agreeable to plan of care and goals as stated below:     GOALS: Pt is in agreement with the following goals.     Short term goals:  6 weeks or 10 visits   1.  Pt will demonstrate increased lumbar ROM by at least 3 degrees from the initial ROM value with improvements noted in functional ROM and ability to perform ADLs.  MET 2/24/22  2.  Pt will demonstrate increased MedX average isometric strength value  by 10% from initial test resulting in improved ability to perform bending, lifting, and carrying activities safely, confidently.  (approp and ongoing)  3.  Patient report a reduction in worst pain score by 1-2 points for improved tolerance for lifting laundry out of dryer.  MET 2/24/22  4.  Pt able to perform HEP correctly with minimal cueing or supervision from therapist to encourage independent management of symptoms. MET 2/24/22        Long term goals: 10  weeks or 20 visits   1. Pt will demonstrate increased lumbar ROM by at least 9 degrees from initial ROM value, resulting in improved ability to perform functional fwd bending while standing and sitting. MET 2/24/22  2. Pt will demonstrate increased MedX average isometric strength value  by 30% from initial test resulting in improved ability to perform bending, lifting, and carrying activities safely, confidently.  (approp and ongoing)  3. Pt to demonstrate ability to independently control and reduce their pain through posture positioning and mechanical movements throughout a typical day.  (approp and ongoing)  4.  Pt will demonstrate reduced pain and improved functional outcomes as reported on the FOTO by reaching a limitation score of < or = 40% or less in order to demonstrate subjective improvement in pt's condition.    (Progressed to 37% impaired; continue to progress to least limitation)  5. Pt will demonstrate independence with the HEP at discharge  (approp and ongoing)  6.  Pt(patient goal)will be able to play tennis without LBP  (approp and ongoing)      Plan   Continue with established Plan of Care towards established PT goals.     Roxanna Nichols, PT  3/22/2022

## 2022-03-24 ENCOUNTER — CLINICAL SUPPORT (OUTPATIENT)
Dept: REHABILITATION | Facility: OTHER | Age: 67
End: 2022-03-24
Attending: PHYSICIAN ASSISTANT
Payer: COMMERCIAL

## 2022-03-24 DIAGNOSIS — R29.898 DECREASED STRENGTH OF TRUNK AND BACK: Primary | ICD-10-CM

## 2022-03-24 PROCEDURE — 97110 THERAPEUTIC EXERCISES: CPT

## 2022-03-24 NOTE — PROGRESS NOTES
"  [facility]      Name: Dasia Henning  Clinic Number: [id number]    Therapy Diagnosis:   Encounter Diagnosis   Name Primary?    Decreased strength of trunk and back Yes     Physician: Mandie Gray PA-C    Visit Date: 3/24/2022       Physician Orders: PT Eval and Treat   Medical Diagnosis from Referral:M43.16 (ICD-10-CM) - Spondylolisthesis of lumbar region   Evaluation Date: 2021  Authorization Period Expiration: 10/13/22  Plan of Care Expiration: 3/18/22  Visit # / Visits authorized:  jayson      Time In 410  Time Out: 510  Total Billable Time: 50 minutes     Precautions: Standard /L spiral ankle fracture (surgical intervention)  Pattern of pain determined: 1PEP    Subjective   Dasia reports she has been standing a lot lately at work and it made her back a little sore    Patient reports tolerating previous visit well /c no c/o of excess discomfort.    Patient reports their pain to be 2/10 on a 0-10 scale with 0 being no pain and 10 being the worst pain imaginable.  Pain Location: right back; and across the lower back.       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  Leisure: tennis/walking /works out with a  2x/wk    Pts goals: "get stronger"    Objective     Baseline Isometric Testing on Med X equipment: Testing administered by PT  Date of testin21  ROM 12-42 deg   Max Peak Torque 168    Min Peak Torque 72    Flex/Ext Ratio 2.3:1   % above  normative data 19   17% below at 12 degrees      Midpoint Isometric Testing on Med X equipment: Testing administered by PT  Date of testin22  ROM 3-45 deg   Max Peak Torque 163   Min Peak Torque 73   Flex/Ext Ratio    Total ROM gain from eval  12 degrees   % below average  % loss from initial 3  14%   % above  normative data 12    *14% gain at 12 deg  *Curve appeared much organized, more linear       Outcomes: FOTO  Initial score: 45%  Visit 6 score: 37%  Goal: " "35%      Treatment    Pt was instructed in and performed the following:     Dasia received therapeutic exercises to develop/improved posture, cardiovascular endurance, muscular endurance, lumbar/cervical ROM, strength and muscular endurance for 60 minutes including the following exercises:   [facility] 3/24/2022   Visit Number 16   VAS Pain Rating 3   Treadmill Time (in min.) 5   Speed -   Recumbent Bike Seat Pos. -   Time 5   Extension in Lying -   Extension in Standing 10   Flexion in Lying 10   Femur Restraint -   Top Dead Center -   Counterweight -   Lumbar Flexion -   Lumbar Extension -   Lumbar Peak Torque -   Min Torque -   Lumbar Weight 72   Repetitions 15   Rating of Perceived Exertion 5   Ice - Z Lie (in min.) 5       LTR x10  EIL x10  Open books x10 B   DKTC c/ ball x10 5" hold  Piriformis stretch figure 4 stretch 30" hold x2      Not Performed:  MET for R ant tilt 3 x 5 sec hold  SL clamshells  Sidelying QL/lat stretch c/overpressure 2x10" ea (QL spasm on R side)  PPT 15x5" tactile and verbal cue for TA activation, dec BLE use   90/90 heel taps x10 tactile cue for cont pelvic tilt  Bridges /c RTB x 15, x 5 /c arms crossed      Dasia completed peripheral muscle strengthening which included 1 set of 15-20 repetitions at a slow, controlled 10-13 second per rep pace focused on strengthening supporting musculature for improved body mechanics and functional mobility.  Pt and therapist focused on proper form during treatment to ensure optimal strengthening of each targeted muscle group.  Machines were utilized including torso rotation, leg extension, leg curl, chest press, upright row. Tricep extension, bicep curl, leg press, and hip abduction added visit 3    Dasia received the following manual therapy techniques: STM to bilateral lumbar erector spinae and QLand bilateral facet gapping for 0 min.     Home Exercises Provided and Patient Education Provided   Home exercises include:  LTR  DKTC "   Extension in Lying x10    PPT (02/01/22)  Bridge    Cardio program: Visit 5  Lifting education date: Visit 11  Lumbar roll: obtained and uses in home chair     Education provided:   - Do's and Don'ts of lifting      Written Home Exercises Provided: Patient instructed to cont prior HEP.  Exercises were reviewed and Dasia was able to demonstrate them prior to the end of the session.  Dasia demonstrated good  understanding of the education provided.     See EMR under Patient Instructions for exercises provided 12/13/2021.      Assessment   Dasia reports she was hurting a little more last night after standing prolonged at an event.  Pt reported she stretched and felt better.  Increased Med X weight to 72ft/lbs with pt completing 15 reps with 5/10 exertion level.  Discussed wellness following DC from program  Patient is making good progress towards established goals.  Pt will continue to benefit from skilled outpatient physical therapy to address the deficits stated in the impairment chart, provide pt/family education and to maximize pt's level of independence in the home and community environment.     Anticipated Barriers for therapy: none  Pt's spiritual, cultural and educational needs considered and pt agreeable to plan of care and goals as stated below:     GOALS: Pt is in agreement with the following goals.     Short term goals:  6 weeks or 10 visits   1.  Pt will demonstrate increased lumbar ROM by at least 3 degrees from the initial ROM value with improvements noted in functional ROM and ability to perform ADLs.  MET 2/24/22  2.  Pt will demonstrate increased MedX average isometric strength value  by 10% from initial test resulting in improved ability to perform bending, lifting, and carrying activities safely, confidently.  (approp and ongoing)  3.  Patient report a reduction in worst pain score by 1-2 points for improved tolerance for lifting laundry out of dryer.  MET 2/24/22  4.  Pt able to perform HEP  correctly with minimal cueing or supervision from therapist to encourage independent management of symptoms. MET 2/24/22        Long term goals: 10 weeks or 20 visits   1. Pt will demonstrate increased lumbar ROM by at least 9 degrees from initial ROM value, resulting in improved ability to perform functional fwd bending while standing and sitting. MET 2/24/22  2. Pt will demonstrate increased MedX average isometric strength value  by 30% from initial test resulting in improved ability to perform bending, lifting, and carrying activities safely, confidently.  (approp and ongoing)  3. Pt to demonstrate ability to independently control and reduce their pain through posture positioning and mechanical movements throughout a typical day.  (approp and ongoing)  4.  Pt will demonstrate reduced pain and improved functional outcomes as reported on the FOTO by reaching a limitation score of < or = 40% or less in order to demonstrate subjective improvement in pt's condition.    (Progressed to 37% impaired; continue to progress to least limitation)  5. Pt will demonstrate independence with the HEP at discharge  (approp and ongoing)  6.  Pt(patient goal)will be able to play tennis without LBP  (approp and ongoing)      Plan   Continue with established Plan of Care towards established PT goals.     Roxanna Nichols, PT  3/24/2022

## 2022-03-31 ENCOUNTER — CLINICAL SUPPORT (OUTPATIENT)
Dept: REHABILITATION | Facility: OTHER | Age: 67
End: 2022-03-31
Attending: PHYSICIAN ASSISTANT
Payer: COMMERCIAL

## 2022-03-31 DIAGNOSIS — R29.898 DECREASED STRENGTH OF TRUNK AND BACK: Primary | ICD-10-CM

## 2022-03-31 PROCEDURE — 97110 THERAPEUTIC EXERCISES: CPT

## 2022-03-31 NOTE — PROGRESS NOTES
"  [facility]      Name: Dasia Henning  Clinic Number: [id number]    Therapy Diagnosis:   Encounter Diagnosis   Name Primary?    Decreased strength of trunk and back Yes     Physician: Mandie Gray PA-C    Visit Date: 3/31/2022       Physician Orders: PT Eval and Treat   Medical Diagnosis from Referral:M43.16 (ICD-10-CM) - Spondylolisthesis of lumbar region   Evaluation Date: 2021  Authorization Period Expiration: 10/13/22  Plan of Care Expiration: 3/18/22  Visit # / Visits authorized:  jayson      Time In 410  Time Out: 510  Total Billable Time: 50 minutes     Precautions: Standard /L spiral ankle fracture (surgical intervention)  Pattern of pain determined: 1PEP    Subjective   Dasia reports she has a nagging pain from sitting in meetings prolonged    Patient reports tolerating previous visit well /c no c/o of excess discomfort.    Patient reports their pain to be 1- 2/10 on a 0-10 scale with 0 being no pain and 10 being the worst pain imaginable.  Pain Location: right back; and across the lower back.       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  Leisure: tennis/walking /works out with a  2x/wk    Pts goals: "get stronger"    Objective     Baseline Isometric Testing on Med X equipment: Testing administered by PT  Date of testin21  ROM 12-42 deg   Max Peak Torque 168    Min Peak Torque 72    Flex/Ext Ratio 2.3:1   % above  normative data 19   17% below at 12 degrees      Midpoint Isometric Testing on Med X equipment: Testing administered by PT  Date of testin22  ROM 3-45 deg   Max Peak Torque 163   Min Peak Torque 73   Flex/Ext Ratio    Total ROM gain from eval  12 degrees   % below average  % loss from initial 3  14%   % above  normative data 12    *14% gain at 12 deg  *Curve appeared much organized, more linear       Outcomes: FOTO  Initial score: 45%  Visit 6 score: 37%  Goal: 35%      Treatment    Pt " "was instructed in and performed the following:     Dasia received therapeutic exercises to develop/improved posture, cardiovascular endurance, muscular endurance, lumbar/cervical ROM, strength and muscular endurance for 60 minutes including the following exercises:   [facility] 3/31/2022   Visit Number 17   VAS Pain Rating 3   Treadmill Time (in min.) -   Speed -   Recumbent Bike Seat Pos. -   Time 5   Extension in Lying -   Extension in Standing 10   Flexion in Lying 10   Femur Restraint -   Top Dead Center -   Counterweight -   Lumbar Flexion -   Lumbar Extension -   Lumbar Peak Torque -   Min Torque -   Lumbar Weight 72   Repetitions 18   Rating of Perceived Exertion 3   Ice - Z Lie (in min.) 5       LTR x10  EIL x10  Open books x10 B   DKTC c/ ball x10 5" hold  Piriformis stretch figure 4 stretch 30" hold x2  Bridging 10x    Not Performed:  MET for R ant tilt 3 x 5 sec hold  SL clamshells  Sidelying QL/lat stretch c/overpressure 2x10" ea (QL spasm on R side)  PPT 15x5" tactile and verbal cue for TA activation, dec BLE use   90/90 heel taps x10 tactile cue for cont pelvic tilt  Bridges /c RTB x 15, x 5 /c arms crossed      Dasia completed peripheral muscle strengthening which included 1 set of 15-20 repetitions at a slow, controlled 10-13 second per rep pace focused on strengthening supporting musculature for improved body mechanics and functional mobility.  Pt and therapist focused on proper form during treatment to ensure optimal strengthening of each targeted muscle group.  Machines were utilized including torso rotation, leg extension, leg curl, chest press, upright row. Tricep extension, bicep curl, leg press, and hip abduction added visit 3    Dasia received the following manual therapy techniques: STM to bilateral lumbar erector spinae and QLand bilateral facet gapping for 0 min.     Home Exercises Provided and Patient Education Provided   Home exercises include:  LTR  DKTC   Extension in Lying " x10    PPT (02/01/22)  Bridge    Cardio program: Visit 5  Lifting education date: Visit 11  Lumbar roll: obtained and uses in home chair     Education provided:   - Do's and Don'ts of lifting      Written Home Exercises Provided: Patient instructed to cont prior HEP.  Exercises were reviewed and Dasia was able to demonstrate them prior to the end of the session.  Dasia demonstrated good  understanding of the education provided.     See EMR under Patient Instructions for exercises provided 12/13/2021.      Assessment   Dasia reports she is having minimal pain today.  Completed 18 reps at 72ft/lbs with 3/10 exertion level.  Encouraged pt to get out of her chair as often as possible to stretch when in daily meetings.  Patient is making good progress towards established goals.  Pt will continue to benefit from skilled outpatient physical therapy to address the deficits stated in the impairment chart, provide pt/family education and to maximize pt's level of independence in the home and community environment.     Anticipated Barriers for therapy: none  Pt's spiritual, cultural and educational needs considered and pt agreeable to plan of care and goals as stated below:     GOALS: Pt is in agreement with the following goals.     Short term goals:  6 weeks or 10 visits   1.  Pt will demonstrate increased lumbar ROM by at least 3 degrees from the initial ROM value with improvements noted in functional ROM and ability to perform ADLs.  MET 2/24/22  2.  Pt will demonstrate increased MedX average isometric strength value  by 10% from initial test resulting in improved ability to perform bending, lifting, and carrying activities safely, confidently.  (approp and ongoing)  3.  Patient report a reduction in worst pain score by 1-2 points for improved tolerance for lifting laundry out of dryer.  MET 2/24/22  4.  Pt able to perform HEP correctly with minimal cueing or supervision from therapist to encourage independent management of  symptoms. MET 2/24/22        Long term goals: 10 weeks or 20 visits   1. Pt will demonstrate increased lumbar ROM by at least 9 degrees from initial ROM value, resulting in improved ability to perform functional fwd bending while standing and sitting. MET 2/24/22  2. Pt will demonstrate increased MedX average isometric strength value  by 30% from initial test resulting in improved ability to perform bending, lifting, and carrying activities safely, confidently.  (approp and ongoing)  3. Pt to demonstrate ability to independently control and reduce their pain through posture positioning and mechanical movements throughout a typical day.  (approp and ongoing)  4.  Pt will demonstrate reduced pain and improved functional outcomes as reported on the FOTO by reaching a limitation score of < or = 40% or less in order to demonstrate subjective improvement in pt's condition.    (Progressed to 37% impaired; continue to progress to least limitation)  5. Pt will demonstrate independence with the HEP at discharge  (approp and ongoing)  6.  Pt(patient goal)will be able to play tennis without LBP  (approp and ongoing)      Plan   Continue with established Plan of Care towards established PT goals.     Roxanna Nichols, PT  3/31/2022

## 2022-04-08 PROBLEM — F51.01 PRIMARY INSOMNIA: Status: ACTIVE | Noted: 2022-04-08

## 2022-04-12 ENCOUNTER — CLINICAL SUPPORT (OUTPATIENT)
Dept: REHABILITATION | Facility: OTHER | Age: 67
End: 2022-04-12
Attending: PHYSICIAN ASSISTANT
Payer: COMMERCIAL

## 2022-04-12 DIAGNOSIS — R29.898 DECREASED STRENGTH OF TRUNK AND BACK: Primary | ICD-10-CM

## 2022-04-12 PROCEDURE — 97110 THERAPEUTIC EXERCISES: CPT | Mod: CQ

## 2022-04-12 NOTE — PLAN OF CARE
"  Outpatient Therapy Updated Plan of Care     Visit Date: 3/22/2022    Name: Dasia Henning  Clinic Number: [id number]    Therapy Diagnosis:   Encounter Diagnosis   Name Primary?    Decreased strength of trunk and back Yes     Physician: Mandie Gray PA-C  Physician Orders: PT Eval and Treat   Medical Diagnosis from Referral:M43.16 (ICD-10-CM) - Spondylolisthesis of lumbar region   Evaluation Date: 2021  Authorization Period Expiration: 10/13/22  Plan of Care Expiration: 22  Visit # / Visits authorized: 15/ 20 juan [email or web address]      Time In:825  Time Out: 920  Total Billable Time: 45minutes     Precautions: Standard /L spiral ankle fracture (surgical intervention)  Pattern of pain determined: 1PEP      Subjective     UpdateSusan reports she is feeling really good     Patient reports tolerating previous visit well /c no c/o of excess discomfort.    Patient reports their pain to be 0/10 on a 0-10 scale with 0 being no pain and 10 being the worst pain imaginable.  Pain Location: right back; and across the lower back.        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  Leisure: tennis/walking /works out with a  2x/wk    Pts goals: "get stronger":     Objective     Update: Baseline Isometric Testing on Med X equipment: Testing administered by PT  Date of testin21  ROM 12-42 deg   Max Peak Torque 168    Min Peak Torque 72    Flex/Ext Ratio 2.3:1   % above  normative data 19   17% below at 12 degrees        Midpoint Isometric Testing on Med X equipment: Testing administered by PT  Date of testin22  ROM 3-45 deg   Max Peak Torque 163   Min Peak Torque 73   Flex/Ext Ratio     Total ROM gain from eval  12 degrees   % below average  % loss from initial 3  14%   % above  normative data 12    *14% gain at 12 deg  *Curve appeared much organized, more linear         Outcomes: FOTO  Initial score: 45%  Visit 6 score: 37%  Goal: 35%         Assessment     Update: Pt is " currently painfree with daily activities and continues to progress in ROM and strength. Pt making good progress towards all goals. Extension of POC to finish programs 20 visits.    GOALS: Pt is in agreement with the following goals.     Short term goals:  6 weeks or 10 visits   1.  Pt will demonstrate increased lumbar ROM by at least 3 degrees from the initial ROM value with improvements noted in functional ROM and ability to perform ADLs.  MET 2/24/22  2.  Pt will demonstrate increased MedX average isometric strength value  by 10% from initial test resulting in improved ability to perform bending, lifting, and carrying activities safely, confidently.  (approp and ongoing)  3.  Patient report a reduction in worst pain score by 1-2 points for improved tolerance for lifting laundry out of dryer.  MET 2/24/22  4.  Pt able to perform HEP correctly with minimal cueing or supervision from therapist to encourage independent management of symptoms. MET 2/24/22        Long term goals: 10 weeks or 20 visits   1. Pt will demonstrate increased lumbar ROM by at least 9 degrees from initial ROM value, resulting in improved ability to perform functional fwd bending while standing and sitting. MET 2/24/22  2. Pt will demonstrate increased MedX average isometric strength value  by 30% from initial test resulting in improved ability to perform bending, lifting, and carrying activities safely, confidently.  (approp and ongoing)  3. Pt to demonstrate ability to independently control and reduce their pain through posture positioning and mechanical movements throughout a typical day.  (approp and ongoing)  4.  Pt will demonstrate reduced pain and improved functional outcomes as reported on the FOTO by reaching a limitation score of < or = 40% or less in order to demonstrate subjective improvement in pt's condition.    (Progressed to 37% impaired; continue to progress to least limitation)  5. Pt will demonstrate independence with the HEP  "at discharge  (approp and ongoing)  6.  Pt(patient goal)will be able to play tennis without LBP  (approp and ongoing)       Reasons for Recertification of Therapy:   Pt making excellent progress with therapy    Plan     Updated Certification Period: 3/22/2022 to 5/1/22  Recommended Treatment Plan: 1-2 times per week for 5 weeks: Manual Therapy, Moist Heat/ Ice, Neuromuscular Re-ed, Patient Education, Self Care, Therapeutic Activities and Therapeutic Exercise      Outpatient physical therapy 1- 2x week for 5 weeks or 20 visits to include the following:   - Patient education  - Therapeutic exercise  - Manual therapy  - Performance testing   - Neuromuscular Re-education  - Therapeutic activity   - Modalities    Pt may be seen by PTA as part of the rehabilitation team.     Therapist: Roxanna Nichols, PT  4/12/2022    "I certify the need for these services furnished under this plan of treatment and while under my care."    ____________________________________  Physician/Referring Practitioner    _______________  Date of Signature    Roxanna Nichols, PT  3/22/2022      I CERTIFY THE NEED FOR THESE SERVICES FURNISHED UNDER THIS PLAN OF TREATMENT AND WHILE UNDER MY CARE    Physician's comments:        Physician's Signature: ___________________________________________________    "

## 2022-04-12 NOTE — PROGRESS NOTES
"  [facility]      Name: Dasia Henning  Clinic Number: [id number]    Therapy Diagnosis:   Encounter Diagnosis   Name Primary?    Decreased strength of trunk and back Yes     Physician: Mandie Gray PA-C    Visit Date: 2022       Physician Orders: PT Eval and Treat   Medical Diagnosis from Referral:M43.16 (ICD-10-CM) - Spondylolisthesis of lumbar region   Evaluation Date: 2021  Authorization Period Expiration: 10/13/22  Plan of Care Expiration: 22  Visit # / Visits authorized:  jayson      Time In 8:30 am  Time Out: 9:20  Total Billable Time: 45 minutes     Precautions: Standard /L spiral ankle fracture (surgical intervention)  Pattern of pain determined: 1PEP    Subjective   Dasia reports "feeling great".  She reports minimal lower back discomfort that she describes as "much better than when I started here".  She is interested in joining wellness following 20th  visit.    Patient reports tolerating previous visit well /c no c/o of excess discomfort.    Patient reports their pain to be 1/10 on a 0-10 scale with 0 being no pain and 10 being the worst pain imaginable.  Pain Location: right back; and across the lower back.       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  Leisure: tennis/walking /works out with a  2x/wk    Pts goals: "get stronger"    Objective     Baseline Isometric Testing on Med X equipment: Testing administered by PT  Date of testin21  ROM 12-42 deg   Max Peak Torque 168    Min Peak Torque 72    Flex/Ext Ratio 2.3:1   % above  normative data 19   17% below at 12 degrees      Midpoint Isometric Testing on Med X equipment: Testing administered by PT  Date of testin22  ROM 3-45 deg   Max Peak Torque 163   Min Peak Torque 73   Flex/Ext Ratio    Total ROM gain from eval  12 degrees   % below average  % loss from initial 3  14%   % above  normative data 12    *14% gain at 12 deg  *Curve " "appeared much organized, more linear       Outcomes: FOTO  Initial score: 45%  Visit 6 score: 37%  Goal: 35%      Treatment    Pt was instructed in and performed the following:     Dasia received therapeutic exercises to develop/improved posture, cardiovascular endurance, muscular endurance, lumbar/cervical ROM, strength and muscular endurance for 45 minutes including the following exercises:     [facility] - Short 4/12/2022   Visit Number 18   VAS Pain Rating 1   Treadmill Time (in min.) -   Speed -   Recumbent Bike - Seat Pos. -   Time 5   Extension in Lying -   Extension in Standing 10   Flexion in Lying 10   Femur Restraint -   Top Dead Center -   Counterweight -   Lumbar Flexion -   Lumbar Extension -   Lumbar Peak Torque -   Lumbar Weight 73   Repetitions 20   Rating of Perceived Exertion 3       LTR x10  EIL x10  Open books x10 B   DKTC c/ ball x10 5" hold  Piriformis stretch figure 4 stretch 30" hold x2  Bridging RTB 15x  SL clamshells  RTB x10     Not Performed:  MET for R ant tilt 3 x 5 sec hold  Sidelying QL/lat stretch c/overpressure 2x10" ea (QL spasm on R side)  PPT 15x5" tactile and verbal cue for TA activation, dec BLE use   90/90 heel taps x10 tactile cue for cont pelvic tilt       Dasia completed peripheral muscle strengthening which included 1 set of 15-20 repetitions at a slow, controlled 10-13 second per rep pace focused on strengthening supporting musculature for improved body mechanics and functional mobility.  Pt and therapist focused on proper form during treatment to ensure optimal strengthening of each targeted muscle group.  Machines were utilized including torso rotation, leg extension, leg curl, chest press, upright row. Tricep extension, bicep curl, leg press, and hip abduction added visit 3    Dasia received the following manual therapy techniques: STM to bilateral lumbar erector spinae and QLand bilateral facet gapping for 0 min.     Home Exercises Provided and Patient " Education Provided   Home exercises include:  LTR  DKTC   Extension in Lying x10    PPT (02/01/22)  Bridge    Cardio program: Visit 5  Lifting education date: Visit 11  Lumbar roll: obtained and uses in home chair     Education provided:   - Do's and Don'ts of lifting      Written Home Exercises Provided: Patient instructed to cont prior HEP.  Exercises were reviewed and Dasia was able to demonstrate them prior to the end of the session.  Dasia demonstrated good  understanding of the education provided.     See EMR under Patient Instructions for exercises provided 12/13/2021.      Assessment   Dasia continues to report minimal lower back pain.  Guided stretches and exercises continued to emphasize lumbopelvic mobility and hip/core/glute strengthening.  Medx resistance remained at 72#.  She completed 20 reps at a RPE of 3/10.  Consider a 5% increase in resistance next visit.  Dasia plans to join wellness following her 20 HB visits.    Patient is making good progress towards established goals.  Pt will continue to benefit from skilled outpatient physical therapy to address the deficits stated in the impairment chart, provide pt/family education and to maximize pt's level of independence in the home and community environment.     Anticipated Barriers for therapy: none  Pt's spiritual, cultural and educational needs considered and pt agreeable to plan of care and goals as stated below:     GOALS: Pt is in agreement with the following goals.     Short term goals:  6 weeks or 10 visits   1.  Pt will demonstrate increased lumbar ROM by at least 3 degrees from the initial ROM value with improvements noted in functional ROM and ability to perform ADLs.  MET 2/24/22  2.  Pt will demonstrate increased MedX average isometric strength value  by 10% from initial test resulting in improved ability to perform bending, lifting, and carrying activities safely, confidently.  (approp and ongoing)  3.  Patient report a reduction in  worst pain score by 1-2 points for improved tolerance for lifting laundry out of dryer.  MET 2/24/22  4.  Pt able to perform HEP correctly with minimal cueing or supervision from therapist to encourage independent management of symptoms. MET 2/24/22        Long term goals: 10 weeks or 20 visits   1. Pt will demonstrate increased lumbar ROM by at least 9 degrees from initial ROM value, resulting in improved ability to perform functional fwd bending while standing and sitting. MET 2/24/22  2. Pt will demonstrate increased MedX average isometric strength value  by 30% from initial test resulting in improved ability to perform bending, lifting, and carrying activities safely, confidently.  (approp and ongoing)  3. Pt to demonstrate ability to independently control and reduce their pain through posture positioning and mechanical movements throughout a typical day.  (approp and ongoing)  4.  Pt will demonstrate reduced pain and improved functional outcomes as reported on the FOTO by reaching a limitation score of < or = 40% or less in order to demonstrate subjective improvement in pt's condition.    (Progressed to 37% impaired; continue to progress to least limitation)  5. Pt will demonstrate independence with the HEP at discharge  (approp and ongoing)  6.  Pt(patient goal)will be able to play tennis without LBP  (approp and ongoing)      Plan   Continue with established Plan of Care towards established PT goals.     Niranjan Martell, PTA  4/12/2022

## 2022-04-28 ENCOUNTER — HOSPITAL ENCOUNTER (OUTPATIENT)
Dept: CARDIOLOGY | Facility: OTHER | Age: 67
Discharge: HOME | End: 2022-04-28
Attending: INTERNAL MEDICINE
Payer: COMMERCIAL

## 2022-04-28 ENCOUNTER — HOSPITAL ENCOUNTER (OUTPATIENT)
Dept: RADIOLOGY | Facility: OTHER | Age: 67
Discharge: HOME | End: 2022-04-28
Attending: INTERNAL MEDICINE
Payer: COMMERCIAL

## 2022-04-28 DIAGNOSIS — Z13.6 ENCOUNTER FOR SCREENING FOR CARDIOVASCULAR DISORDERS: ICD-10-CM

## 2022-04-28 PROCEDURE — 75571 CT HRT W/O DYE W/CA TEST: CPT | Mod: TC

## 2022-04-28 PROCEDURE — 93010 ELECTROCARDIOGRAM REPORT: CPT | Mod: ,,, | Performed by: INTERNAL MEDICINE

## 2022-04-28 PROCEDURE — 93010 EKG 12-LEAD: ICD-10-PCS | Mod: ,,, | Performed by: INTERNAL MEDICINE

## 2022-04-28 PROCEDURE — 75571 CT HRT W/O DYE W/CA TEST: CPT | Mod: 26,,, | Performed by: RADIOLOGY

## 2022-04-28 PROCEDURE — 93005 ELECTROCARDIOGRAM TRACING: CPT | Mod: 59

## 2022-04-28 PROCEDURE — 75571 CT CALCIUM SCORING CARDIAC: ICD-10-PCS | Mod: 26,,, | Performed by: RADIOLOGY

## 2022-05-02 ENCOUNTER — CLINICAL SUPPORT (OUTPATIENT)
Dept: REHABILITATION | Facility: OTHER | Age: 67
End: 2022-05-02
Attending: PHYSICIAN ASSISTANT
Payer: COMMERCIAL

## 2022-05-02 DIAGNOSIS — R29.898 DECREASED STRENGTH OF TRUNK AND BACK: Primary | ICD-10-CM

## 2022-05-02 PROCEDURE — 97110 THERAPEUTIC EXERCISES: CPT

## 2022-05-02 NOTE — PROGRESS NOTES
"  [facility]      Name: Dasia Henning  Clinic Number: [id number]    Therapy Diagnosis:   Encounter Diagnosis   Name Primary?    Decreased strength of trunk and back Yes     Physician: Mandie Gray PA-C    Visit Date: 2022       Physician Orders: PT Eval and Treat   Medical Diagnosis from Referral:M43.16 (ICD-10-CM) - Spondylolisthesis of lumbar region   Evaluation Date: 2021  Authorization Period Expiration: 10/13/22  Plan of Care Expiration: 22 extend to 5/10/2022  Visit # / Visits authorized:  juan manuel1      Time In 8:30 am  Time Out: 9:20  Total Billable Time: 45 minutes     Precautions: Standard /L spiral ankle fracture (surgical intervention)  Pattern of pain determined: 1PEP    Subjective   Dasia reports doing well on her trip to [place], she completed lots of walking and was sore be the end of the day. Overall significantly improved since beginning program when it was harder for her to walk long distances.     Patient reports tolerating previous visit well /c no c/o of excess discomfort.    Patient reports their pain to be 1/10 on a 0-10 scale with 0 being no pain and 10 being the worst pain imaginable.  Pain Location: right back; and across the lower back.       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  Leisure: tennis/walking /works out with a  2x/wk    Pts goals: "get stronger"    Objective     Baseline Isometric Testing on Med X equipment: Testing administered by PT  Date of testin21  ROM 12-42 deg   Max Peak Torque 168    Min Peak Torque 72    Flex/Ext Ratio 2.3:1   % above  normative data 19   17% below at 12 degrees      Midpoint Isometric Testing on Med X equipment: Testing administered by PT  Date of testin22  ROM 3-45 deg   Max Peak Torque 163   Min Peak Torque 73   Flex/Ext Ratio    Total ROM gain from eval  12 degrees   % below average  % loss from initial 3  14%   % above  " "normative data 12    *14% gain at 12 deg  *Curve appeared much organized, more linear       Outcomes: FOTO  Initial score: 45%  Visit 6 score: 37%  Goal: 35%      Treatment    Pt was instructed in and performed the following:     Dasia received therapeutic exercises to develop/improved posture, cardiovascular endurance, muscular endurance, lumbar/cervical ROM, strength and muscular endurance for 45 minutes including the following exercises:   [facility] 5/2/2022   Visit Number 19   VAS Pain Rating 1   Treadmill Time (in min.) -   Speed -   Recumbent Bike Seat Pos. -   Time 5   Extension in Lying -   Extension in Standing 10   Flexion in Lying 10   Femur Restraint -   Top Dead Center -   Counterweight -   Lumbar Flexion -   Lumbar Extension -   Lumbar Peak Torque -   Min Torque -   Lumbar Weight 75   Repetitions 15   Rating of Perceived Exertion 3   Ice - Z Lie (in min.) 5         LTR x10  EIL x10  Open books x10 B   DKTC c/ ball x10 5" hold  Piriformis stretch figure 4 stretch 30" hold x2  Bridging RTB 15x  SL clamshells  RTB x10     Not Performed:  MET for R ant tilt 3 x 5 sec hold  Sidelying QL/lat stretch c/overpressure 2x10" ea (QL spasm on R side)  PPT 15x5" tactile and verbal cue for TA activation, dec BLE use   90/90 heel taps x10 tactile cue for cont pelvic tilt       Dasia completed peripheral muscle strengthening which included 1 set of 15-20 repetitions at a slow, controlled 10-13 second per rep pace focused on strengthening supporting musculature for improved body mechanics and functional mobility.  Pt and therapist focused on proper form during treatment to ensure optimal strengthening of each targeted muscle group.  Machines were utilized including torso rotation, leg extension, leg curl, chest press, upright row. Tricep extension, bicep curl, leg press, and hip abduction added visit 3    Dasia received the following manual therapy techniques: STM to bilateral lumbar erector spinae and QLand " bilateral facet gapping for 0 min.     Home Exercises Provided and Patient Education Provided   Home exercises include:  LTR  DKTC   Extension in Lying x10    PPT (02/01/22)  Bridge    Cardio program: Visit 5  Lifting education date: Visit 11  Lumbar roll: obtained and uses in home chair     Education provided:   - Do's and Don'ts of lifting      Written Home Exercises Provided: Patient instructed to cont prior HEP.  Exercises were reviewed and Dasia was able to demonstrate them prior to the end of the session.  Dasia demonstrated good  understanding of the education provided.     See EMR under Patient Instructions for exercises provided 12/13/2021.      Assessment   Dasia continues to report minimal lower back pain.  Guided stretches and exercises continued to emphasize lumbopelvic mobility and hip/core/glute strengthening.  Medx resistance incrased to 75#.  She completed 15 reps at a RPE of 3/10.  Pt appropriate for d/c next visit.     Patient is making good progress towards established goals.  Pt will continue to benefit from skilled outpatient physical therapy to address the deficits stated in the impairment chart, provide pt/family education and to maximize pt's level of independence in the home and community environment.     Anticipated Barriers for therapy: none  Pt's spiritual, cultural and educational needs considered and pt agreeable to plan of care and goals as stated below:     GOALS: Pt is in agreement with the following goals.     Short term goals:  6 weeks or 10 visits   1.  Pt will demonstrate increased lumbar ROM by at least 3 degrees from the initial ROM value with improvements noted in functional ROM and ability to perform ADLs.  MET 2/24/22  2.  Pt will demonstrate increased MedX average isometric strength value  by 10% from initial test resulting in improved ability to perform bending, lifting, and carrying activities safely, confidently.  (approp and ongoing)  3.  Patient report a reduction in  worst pain score by 1-2 points for improved tolerance for lifting laundry out of dryer.  MET 2/24/22  4.  Pt able to perform HEP correctly with minimal cueing or supervision from therapist to encourage independent management of symptoms. MET 2/24/22        Long term goals: 10 weeks or 20 visits   1. Pt will demonstrate increased lumbar ROM by at least 9 degrees from initial ROM value, resulting in improved ability to perform functional fwd bending while standing and sitting. MET 2/24/22  2. Pt will demonstrate increased MedX average isometric strength value  by 30% from initial test resulting in improved ability to perform bending, lifting, and carrying activities safely, confidently.  (approp and ongoing)  3. Pt to demonstrate ability to independently control and reduce their pain through posture positioning and mechanical movements throughout a typical day.  (approp and ongoing)  4.  Pt will demonstrate reduced pain and improved functional outcomes as reported on the FOTO by reaching a limitation score of < or = 40% or less in order to demonstrate subjective improvement in pt's condition.    (Progressed to 37% impaired; continue to progress to least limitation)  5. Pt will demonstrate independence with the HEP at discharge  (approp and ongoing)  6.  Pt(patient goal)will be able to play tennis without LBP  (approp and ongoing)      Plan   Continue with established Plan of Care towards established PT goals.     Lorenzo French, PT  5/2/2022

## 2022-05-02 NOTE — PLAN OF CARE
Outpatient Therapy Updated Plan of Care     Visit Date: 2022    Name: Dasia Henning  Clinic Number: [id number]    Therapy Diagnosis:   Encounter Diagnosis   Name Primary?    Decreased strength of trunk and back Yes     Physician: Mandie Gray PA-C  Physician Orders: PT Eval and Treat   Medical Diagnosis from Referral:M43.16 (ICD-10-CM) - Spondylolisthesis of lumbar region   Evaluation Date: 2021  Authorization Period Expiration: 10/13/22  Plan of Care Expiration: 22 extend to 5/10/2022  Visit # / Visits authorized:        Precautions: Standard /L spiral ankle fracture (surgical intervention)    Subjective     Update: Dasia reports having significant improvements in functional mobility since starting healthy back program. She reports pain levels decreased and she is able to walk longer distances with less pain.     Objective     Update:   Baseline Isometric Testing on Med X equipment: Testing administered by PT  Date of testin21  ROM 12-42 deg   Max Peak Torque 168    Min Peak Torque 72    Flex/Ext Ratio 2.3:1   % above  normative data 19   17% below at 12 degrees        Midpoint Isometric Testing on Med X equipment: Testing administered by PT  Date of testin22  ROM 3-45 deg   Max Peak Torque 163   Min Peak Torque 73   Flex/Ext Ratio     Total ROM gain from eval  12 degrees   % below average  % loss from initial 3  14%   % above  normative data 12    *14% gain at 12 deg  *Curve appeared much organized, more linear          Assessment     Update:   Dasia  has attended 19 visits at [facility] which included MD evaluation, PT evaluation with isometric testing, and physical therapy treatment including HEP instruction, education, aerobic work, dynamic strengthening on med ex equipment for the spine, and whole body strengthening on med ex equipment with increasing weight loads.  Patient  is demonstrating increased ability to reduce symptoms, improved posture, improved   " ROM, and improved   strength as follows:     -Improved posture,  using lumbar roll  -Improved lumbar ROM,  initially on med ex test 12-42 deg and   currently 3-45  Deg (a 12 deg ROM gain since eval).  -Improved strength at each test point on lumbar med ex IM strength with   12% above average normal; pt displayed a uniform and linear graph compared to first test. There were improvement noted with Reduced pain.   -Initial outcome tool score 45% and current outcome tool score  37% (at best so far) indicating reduced pain and improved function    Previous Short Term Goals Status:   Met 3/4    Long Term Goal Status:   continue per initial plan of care.  Reasons for Recertification of Therapy:   To allow patient to complete full allotment of visits so that they may achieve maximum therapeutic benefit      Plan     Updated Certification Period: 5/2/2022 to 5/10/2022    Outpatient physical therapy 2x week for 13 weeks or 20 visits to include the following:   - Patient education  - Therapeutic exercise  - Manual therapy  - Performance testing   - Neuromuscular Re-education  - Therapeutic activity   - Modalities    Pt may be seen by PTA as part of the rehabilitation team.     Therapist: Lorenzo French, PT  5/2/2022    "I certify the need for these services furnished under this plan of treatment and while under my care."    ____________________________________  Physician/Referring Practitioner    _______________  Date of Signature      "

## 2022-05-06 ENCOUNTER — CLINICAL SUPPORT (OUTPATIENT)
Dept: REHABILITATION | Facility: OTHER | Age: 67
End: 2022-05-06
Attending: PHYSICIAN ASSISTANT
Payer: COMMERCIAL

## 2022-05-06 DIAGNOSIS — R29.898 DECREASED STRENGTH OF TRUNK AND BACK: Primary | ICD-10-CM

## 2022-05-06 PROCEDURE — 97110 THERAPEUTIC EXERCISES: CPT

## 2022-05-06 PROCEDURE — 97750 PHYSICAL PERFORMANCE TEST: CPT

## 2022-05-06 NOTE — PROGRESS NOTES
"  [facility]      Name: Dasia Henning  Clinic Number: [id number]    Therapy Diagnosis:   Encounter Diagnosis   Name Primary?    Decreased strength of trunk and back Yes     Physician: Mandie Gray PA-C    Visit Date: 2022       Physician Orders: PT Eval and Treat   Medical Diagnosis from Referral:M43.16 (ICD-10-CM) - Spondylolisthesis of lumbar region   Evaluation Date: 2021  Authorization Period Expiration: 10/13/22  Plan of Care Expiration:  5/10/2022  Visit # / Visits authorized:  juan manuel1      Time In 8:30 am  Time Out: 9:30   Total Billable Time: 60 minutes     Precautions: Standard /L spiral ankle fracture (surgical intervention)  Pattern of pain determined: 1PEP    Subjective   Dasia reports feeling fine at this time    Patient reports tolerating previous visit well /c no c/o of excess discomfort.    Patient reports their pain to be 1/10 on a 0-10 scale with 0 being no pain and 10 being the worst pain imaginable.  Pain Location: right back; and across the lower back.       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  Leisure: tennis/walking /works out with a  2x/wk    Pts goals: "get stronger"    Objective     Baseline Isometric Testing on Med X equipment: Testing administered by PT  Date of testin21  ROM 12-42 deg   Max Peak Torque 168    Min Peak Torque 72    Flex/Ext Ratio 2.3:1   % above  normative data 19   17% below at 12 degrees      Midpoint Isometric Testing on Med X equipment: Testing administered by PT  Date of testin22  ROM 3-45 deg   Max Peak Torque 163   Min Peak Torque 73   Flex/Ext Ratio    Total ROM gain from eval  12 degrees   % below average  % loss from initial 3  14%   % above  normative data 12    *14% gain at 12 deg  *Curve appeared much organized, more linear     Final Isometric Testing on Med X equipment: Testing administered by PT  Date of testin2022   ROM 0-51deg " "  Max Peak Torque 167   Min Peak Torque 97   Flex/Ext Ratio 1.7   Total ROM gain from eval  21 degrees   % strength increase 22   % above  normative data 39       Outcomes: FOTO  Initial score: 45%  Visit 6 score: 37%  Visit 20:28%  Goal: 35%      Treatment    Pt was instructed in and performed the following:     Dasia received therapeutic exercises to develop/improved posture, cardiovascular endurance, muscular endurance, lumbar/cervical ROM, strength and muscular endurance for 45 minutes including the following exercises:       [facility] 5/6/2022   Visit Number 20   VAS Pain Rating 1   Treadmill Time (in min.) -   Speed -   Recumbent Bike Seat Pos. -   Time -   Extension in Lying -   Extension in Standing 10   Flexion in Lying 10   Femur Restraint -   Top Dead Center -   Counterweight -   Lumbar Flexion 51   Lumbar Extension 0   Lumbar Peak Torque 167   Min Torque 94   Test Percent Gain in Strength from Initial  22   Lumbar Weight -   Repetitions -   Rating of Perceived Exertion -   Ice - Z Lie (in min.) 5           LTR x10  EIL x10  Open books x10 B   DKTC c/ ball x10 5" hold  Piriformis stretch figure 4 stretch 30" hold x2  Bridging GTB 15x  SL clamshells  GTB x10     Not Performed:  MET for R ant tilt 3 x 5 sec hold  Sidelying QL/lat stretch c/overpressure 2x10" ea (QL spasm on R side)  PPT 15x5" tactile and verbal cue for TA activation, dec BLE use   90/90 heel taps x10 tactile cue for cont pelvic tilt       Dasia completed peripheral muscle strengthening which included 1 set of 15-20 repetitions at a slow, controlled 10-13 second per rep pace focused on strengthening supporting musculature for improved body mechanics and functional mobility.  Pt and therapist focused on proper form during treatment to ensure optimal strengthening of each targeted muscle group.  Machines were utilized including torso rotation, leg extension, leg curl, chest press, upright row. Tricep extension, bicep curl, leg press, " and hip abduction added visit 3    Dasia received the following manual therapy techniques: STM to bilateral lumbar erector spinae and QLand bilateral facet gapping for 0 min.     Home Exercises Provided and Patient Education Provided   Home exercises include:  LTR  DKTC   Extension in Lying x10    PPT (02/01/22)  Bridge    Cardio program: Visit 5  Lifting education date: Visit 11  Lumbar roll: obtained and uses in home chair     Education provided:   - Do's and Don'ts of lifting      Written Home Exercises Provided: Patient instructed to cont prior HEP.  Exercises were reviewed and Dasia was able to demonstrate them prior to the end of the session.  Dasia demonstrated good  understanding of the education provided.     See EMR under Patient Instructions for exercises provided 12/13/2021.      Assessment   Patient has attended 20 visits of the [facility] program for aerobic work, med ex isometric testing with dynamic strengthening on med ex equipment for spine, whole body strengthening on med ex equipment, HEP, education.  Patient has completed [facility] Program and is ready to be transitioned into wellness program.  Importance of wellness program, and attending 1/week to maintain strength stressed.  Importance of continuing there ex and body mech and ergonomics stressed.   Patient demonstrates improvement in ability to reduce symptoms, improved posture, improved ROM and improved strength.   Reviewed HEP, and importance of maintaining a healthy spine through continued stretching and performance of HEP, good posture, good ergonomics, good body mech with ADL, leisure, and work.  Discharge handout with HEP given, and discussed aspects of exercise program, cardio, strengthening, and stretching.    Patient expressed understanding information given.  Patient plans to attend wellness and is ready to transition to wellness.      -Improved posture  -Improved lumbar ROM,  initially on med ex test 12-42 and   currently  0-51  -Improved strength at each test point on lumbar med ex IM test with   22% average improvement noted with Reduced pain  Noted by patient  -Initial outcome tool score 45% and current outcome tool score  28% indicating reduced pain and improved function          Anticipated Barriers for therapy: none  Pt's spiritual, cultural and educational needs considered and pt agreeable to plan of care and goals as stated below:     GOALS: Pt is in agreement with the following goals.     Short term goals:  6 weeks or 10 visits   1.  Pt will demonstrate increased lumbar ROM by at least 3 degrees from the initial ROM value with improvements noted in functional ROM and ability to perform ADLs.  MET 2/24/22  2.  Pt will demonstrate increased MedX average isometric strength value  by 10% from initial test resulting in improved ability to perform bending, lifting, and carrying activities safely, confidently.  (Met 5/6/2022)  3.  Patient report a reduction in worst pain score by 1-2 points for improved tolerance for lifting laundry out of dryer.  MET 2/24/22  4.  Pt able to perform HEP correctly with minimal cueing or supervision from therapist to encourage independent management of symptoms. MET 2/24/22        Long term goals: 10 weeks or 20 visits   1. Pt will demonstrate increased lumbar ROM by at least 9 degrees from initial ROM value, resulting in improved ability to perform functional fwd bending while standing and sitting. MET 2/24/22  2. Pt will demonstrate increased MedX average isometric strength value  by 30% from initial test resulting in improved ability to perform bending, lifting, and carrying activities safely, confidently.  (partially met)  3. Pt to demonstrate ability to independently control and reduce their pain through posture positioning and mechanical movements throughout a typical day.  (Met 5/6/2022)  4.  Pt will demonstrate reduced pain and improved functional outcomes as reported on the FOTO by reaching a  limitation score of < or = 40% or less in order to demonstrate subjective improvement in pt's condition.    (Met 5/6/2022)  5. Pt will demonstrate independence with the HEP at discharge  (Met 5/6/2022)  6.  Pt(patient goal)will be able to play tennis without LBP  (partially met)      Plan   Continue with established Plan of Care towards established PT goals.     Celeste Ann, PT  5/6/2022

## 2022-05-10 ENCOUNTER — TELEPHONE (OUTPATIENT)
Dept: DERMATOLOGY | Facility: CLINIC | Age: 67
End: 2022-05-10
Payer: COMMERCIAL

## 2022-05-10 NOTE — TELEPHONE ENCOUNTER
----- Message from Gulilermo Bernard sent at 5/10/2022 12:27 PM CDT -----  Contact: Patient  Patient requesting call back in regards to missing a call.      Patient @[phone]

## 2022-05-10 NOTE — TELEPHONE ENCOUNTER
NP is a referral from Dr. Coon with [facility] on L distal nasal bridge/tip. Scheduled for same day mohs on 6/21 at 740 am. Over the phone consult completed. Pt confirmed date, time, and location. New patient packet sent in the mail.

## 2022-05-10 NOTE — TELEPHONE ENCOUNTER
NP is a referral from Dr. Coon with [facility] on L distal nasal bridge/tip. Called and left message to call us back to either schedule same day mohs or consult.

## 2022-05-24 ENCOUNTER — PROCEDURE VISIT (OUTPATIENT)
Dept: DERMATOLOGY | Facility: CLINIC | Age: 67
End: 2022-05-24
Payer: COMMERCIAL

## 2022-05-24 VITALS
WEIGHT: 189 LBS | SYSTOLIC BLOOD PRESSURE: 131 MMHG | HEIGHT: 67 IN | HEART RATE: 70 BPM | BODY MASS INDEX: 29.66 KG/M2 | DIASTOLIC BLOOD PRESSURE: 84 MMHG

## 2022-05-24 DIAGNOSIS — C44.321 SQUAMOUS CELL CARCINOMA OF SKIN OF NOSE: Primary | ICD-10-CM

## 2022-05-24 PROCEDURE — 13151 CMPLX RPR E/N/E/L 1.1-2.5 CM: CPT | Mod: 51,S$GLB,, | Performed by: DERMATOLOGY

## 2022-05-24 PROCEDURE — 13151 PR RECMPL WND LID,NOS,EAR 1.1-2.5 CM: ICD-10-PCS | Mod: 51,S$GLB,, | Performed by: DERMATOLOGY

## 2022-05-24 PROCEDURE — 99499 NO LOS: ICD-10-PCS | Mod: S$GLB,,, | Performed by: DERMATOLOGY

## 2022-05-24 PROCEDURE — 99499 UNLISTED E&M SERVICE: CPT | Mod: S$GLB,,, | Performed by: DERMATOLOGY

## 2022-05-24 PROCEDURE — 17311: ICD-10-PCS | Mod: S$GLB,,, | Performed by: DERMATOLOGY

## 2022-05-24 PROCEDURE — 17311 MOHS 1 STAGE H/N/HF/G: CPT | Mod: S$GLB,,, | Performed by: DERMATOLOGY

## 2022-05-24 RX ORDER — CEPHALEXIN 500 MG/1
500 CAPSULE ORAL EVERY 8 HOURS
Qty: 21 CAPSULE | Refills: 0 | Status: SHIPPED | OUTPATIENT
Start: 2022-05-24 | End: 2022-05-31

## 2022-05-24 NOTE — PROGRESS NOTES
PROCEDURE: Mohs' Micrographic Surgery    INDICATION: Location in mask areas of face including central face, nose, eyelids, eyebrows, lips, chin, preauricular, temple, and ear.    REFERRING PROVIDER: Alex Coon M.D.    CASE NUMBER:     ANESTHETIC: 2 cc 0.5% Lidocaine with Epi 1:200,000 mixed 1:1 with 0.5% Bupivacaine    SURGICAL PREP: Hibiclens    SURGEON: Ammon Flower MD    ASSISTANTS: Ravin Pagan    PREOPERATIVE DIAGNOSIS: squamous cell carcinoma, early invasive    POSTOPERATIVE DIAGNOSIS: squamous cell carcinoma    PATHOLOGIC DIAGNOSIS: squamous cell carcinoma    HISTOLOGY OF SPECIMENS IN FIRST STAGE:   Tumor Type: No tumor seen. Actinic keratosis: Keratinocyte atypia along the basal layer.      STAGES OF MOHS' SURGERY PERFORMED: 1    TUMOR-FREE PLANE ACHIEVED: Yes    HEMOSTASIS: electrocoagulation     SPECIMENS: 2     LOCATION: left distal nasal bridge/tip. Patient verified location in mirror and also verified with Dr Coon's photograph..    INITIAL LESION SIZE: 0.4 x 0.4 cm    FINAL DEFECT SIZE: 0.5 x 0.5 cm    WOUND REPAIR/DISPOSITION: The patient tolerated Mohs' Micrographic Surgery for a squamous cell carcinoma very well. When the tumor was completely removed, a repair of the surgical defect was undertaken.      PROCEDURE: Complex Linear Repair    INDICATION: Status post Mohs' Micrographic Surgery for squamous cell carcinoma.    REFERRING PROVIDER: Alex Coon M.D.    CASE NUMBER:     SURGEON: Ammon Flower MD    ASSISTANTS: Gladis Lui Surg Angella    ANESTHETIC: 1 cc 1% Lidocaine with Epinephrine 1:100,000    SURGICAL PREP: Hibiclens, prepped by Gladis Lui Surg Tech    LOCATION: left distal nasal bridge/tip    DEFECT SIZE: 0.5 x 0.5 cm    WOUND REPAIR/DISPOSITION:  After the patient's carcinoma had been completely removed with Mohs' Micrographic Surgery, a repair of the surgical defect was undertaken. The patient was returned to the operating suite where the area  "of the left nasal tip was prepped, draped, and anesthetized in the usual sterile fashion. The wound was widely undermined in all directions.  The wound was undermined to a distance at least the maximum width of the defect as measured perpendicular to the closure line along at least one entire edge of the defect, in this case 1 cm.  Then, electrocoagulation was used to obtain meticulous hemostasis. 5-0 Vicryl buried vertical mattress sutures were placed into the subcutaneous and dermal plane to close the wound and pilar the cutaneous wound edge. Bilateral dog ears were identified and were removed by a standard Burow's triangle technique. The cutaneous wound edges were closed using interrupted 6-0 Prolene suture.    The patient tolerated the procedure well.    The area was cleaned and dressed appropriately and the patient was given wound care instructions, as well as appointment for follow-up evaluation and suture removal in 7 days.  Pt was placed on Keflex 500 mg tid x 7 days.     LENGTH OF REPAIR: 1.5 cm    Vitals:    05/24/22 0735 05/24/22 0951   BP: 103/69 131/84   BP Location: Left arm Left arm   Patient Position: Sitting Sitting   BP Method: Small (Automatic) Small (Automatic)   Pulse: 84 70   Weight: 85.7 kg (189 lb)    Height: 5' 7" (1.702 m)          "

## 2022-05-27 ENCOUNTER — TELEPHONE (OUTPATIENT)
Dept: DERMATOLOGY | Facility: CLINIC | Age: 67
End: 2022-05-27
Payer: COMMERCIAL

## 2022-05-27 NOTE — TELEPHONE ENCOUNTER
Pt was r/s for a later time for suture removal left distal nasal bridge from 8:30 to 11:10. Pt verbally confirmed new appt time.

## 2022-05-27 NOTE — TELEPHONE ENCOUNTER
----- Message from Adirana Villarreal, Patient Care Assistant sent at 5/27/2022  9:21 AM CDT -----  Regarding: call back  Contact: Pt  Pt is requesting a call back in regards to rescheduling an appt.       Pt @ [phone]

## 2022-05-31 ENCOUNTER — OFFICE VISIT (OUTPATIENT)
Dept: DERMATOLOGY | Facility: CLINIC | Age: 67
End: 2022-05-31
Payer: COMMERCIAL

## 2022-05-31 DIAGNOSIS — Z09 POSTOP CHECK: Primary | ICD-10-CM

## 2022-05-31 PROCEDURE — 1126F PR PAIN SEVERITY QUANTIFIED, NO PAIN PRESENT: ICD-10-PCS | Mod: CPTII,S$GLB,, | Performed by: DERMATOLOGY

## 2022-05-31 PROCEDURE — 1126F AMNT PAIN NOTED NONE PRSNT: CPT | Mod: CPTII,S$GLB,, | Performed by: DERMATOLOGY

## 2022-05-31 PROCEDURE — 1101F PT FALLS ASSESS-DOCD LE1/YR: CPT | Mod: CPTII,S$GLB,, | Performed by: DERMATOLOGY

## 2022-05-31 PROCEDURE — 3288F PR FALLS RISK ASSESSMENT DOCUMENTED: ICD-10-PCS | Mod: CPTII,S$GLB,, | Performed by: DERMATOLOGY

## 2022-05-31 PROCEDURE — 99024 PR POST-OP FOLLOW-UP VISIT: ICD-10-PCS | Mod: S$GLB,,, | Performed by: DERMATOLOGY

## 2022-05-31 PROCEDURE — 99999 PR PBB SHADOW E&M-EST. PATIENT-LVL II: ICD-10-PCS | Mod: PBBFAC,,, | Performed by: DERMATOLOGY

## 2022-05-31 PROCEDURE — 1160F RVW MEDS BY RX/DR IN RCRD: CPT | Mod: CPTII,S$GLB,, | Performed by: DERMATOLOGY

## 2022-05-31 PROCEDURE — 1159F MED LIST DOCD IN RCRD: CPT | Mod: CPTII,S$GLB,, | Performed by: DERMATOLOGY

## 2022-05-31 PROCEDURE — 99999 PR PBB SHADOW E&M-EST. PATIENT-LVL II: CPT | Mod: PBBFAC,,, | Performed by: DERMATOLOGY

## 2022-05-31 PROCEDURE — 1159F PR MEDICATION LIST DOCUMENTED IN MEDICAL RECORD: ICD-10-PCS | Mod: CPTII,S$GLB,, | Performed by: DERMATOLOGY

## 2022-05-31 PROCEDURE — 99024 POSTOP FOLLOW-UP VISIT: CPT | Mod: S$GLB,,, | Performed by: DERMATOLOGY

## 2022-05-31 PROCEDURE — 3288F FALL RISK ASSESSMENT DOCD: CPT | Mod: CPTII,S$GLB,, | Performed by: DERMATOLOGY

## 2022-05-31 PROCEDURE — 1160F PR REVIEW ALL MEDS BY PRESCRIBER/CLIN PHARMACIST DOCUMENTED: ICD-10-PCS | Mod: CPTII,S$GLB,, | Performed by: DERMATOLOGY

## 2022-05-31 PROCEDURE — 1101F PR PT FALLS ASSESS DOC 0-1 FALLS W/OUT INJ PAST YR: ICD-10-PCS | Mod: CPTII,S$GLB,, | Performed by: DERMATOLOGY

## 2022-05-31 NOTE — PROGRESS NOTES
66 y.o. female patient is here for suture removal following Mohs' surgery.    Patient reports no problems left distal nasal bridge/tip.    WOUND PE:  The left distal nasal bridge/tip sutures intact. Wound healing well. Good skin edges. No signs or symptoms of infection.    IMPRESSION:  Healing operative site from Mohs' surgery SCC, left distal nasal bridge/tip s/p with CLC, postop day # 7.    PLAN:  Sutures removed today by Dora Sinclair MA. Steri-strips applied.  Continue wound care.  Keep moist with Aquaphor.  Offered 1 month recheck - pt going out of town, advised to send in photo in 1 month or come in person if any concern arises.     RTC:  In 3-6 months with Alex Coon M.D. for skin check or sooner if new concern arises.

## 2022-08-11 NOTE — TELEPHONE ENCOUNTER
----- Message from Landen Servin sent at 5/10/2022  1:23 PM CDT -----  Contact: @[phone]  Pt requesting a call back from Dora pt states she has missed a call twice.  Pt states she is free until 2pm.     Resident

## 2022-08-26 ENCOUNTER — CLINICAL SUPPORT (OUTPATIENT)
Dept: REHABILITATION | Facility: OTHER | Age: 67
End: 2022-08-26
Attending: INTERNAL MEDICINE
Payer: COMMERCIAL

## 2022-08-26 DIAGNOSIS — M43.16 SPONDYLOLISTHESIS OF LUMBAR REGION: ICD-10-CM

## 2022-08-26 DIAGNOSIS — R29.898 DECREASED STRENGTH OF TRUNK AND BACK: Primary | ICD-10-CM

## 2022-08-26 PROCEDURE — 97161 PT EVAL LOW COMPLEX 20 MIN: CPT | Performed by: PHYSICAL MEDICINE & REHABILITATION

## 2022-08-26 NOTE — PLAN OF CARE
"  [facility] Treatment -eval and transition to wellness     Name: Dasia Henning  Clinic Number: [id number]    Therapy Diagnosis:   Encounter Diagnoses   Name Primary?    Spondylolisthesis of lumbar region     Decreased strength of trunk and back Yes     Physician: Silvia Isbell, *    Visit Date: 2022       Physician Orders: PT Eval and Treat   Medical Diagnosis from Referral: M43.16 (ICD-10-CM) - Spondylolisthesis of lumbar region  Evaluation Date: 22  Authorization Period Expiration: 23  Plan of Care Expiration:  22 for therapy, transition to wellness  Visit # / Visits authorized:        Time In  1:55 pm  Time Out: 2:59 pm  Total Billable Time: 60 minutes     Precautions: Standard /L spiral ankle fracture (surgical intervention)  Pattern of pain determined: 1PEP    Subjective   Dasia attended healthy back from 2022 through May of 2022.  She had planned to attend wellness but for various reasons was unable to do so.  She attends today for evaluation and transition to wellness.  She reports she continues to watch her posture, use a lumbar roll, lift correctly, and exercise at a gym in [place] that has [employer] equipment.  She has been consistent with her exercise program at home and stretches daily.  She had no back pain today. Her goal is to attend wellness until the end of her year of care and become independent with setting up machines, for the next stage of her wellbeing and exercise goals.    Patient reports their pain to be 0/10 on a 0-10 scale with 0 being no pain and 10 being the worst pain imaginable.  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  Leisure: tennis/walking /works out with a  2x/wk    Pts goals: "learn how to set up machines and be indep with exercise"    Objective     Baseline Isometric Testing on Med X equipment: Testing administered by PT  Date of testin21  ROM 12-42 deg   Max Peak Torque 168 "    Min Peak Torque 72    Flex/Ext Ratio 2.3:1   % above  normative data 19   17% below at 12 degrees      Midpoint Isometric Testing on Med X equipment: Testing administered by PT  Date of testin22  ROM 3-45 deg   Max Peak Torque 163   Min Peak Torque 73   Flex/Ext Ratio    Total ROM gain from eval  12 degrees   % below average  % loss from initial 3  14%   % above  normative data 12    *14% gain at 12 deg  *Curve appeared much organized, more linear     Final Isometric Testing on Med X equipment: Testing administered by PT  Date of testin2022   ROM 0-51deg   Max Peak Torque 167   Min Peak Torque 97   Flex/Ext Ratio 1.7   Total ROM gain from eval  21 degrees   % strength increase 22   % above  normative data 39       MOVEMENT LOSS back   Norms ROM Loss 22   Flexion Fingers touch toes, sacral angle >/= 70 deg, uniform spinal curvature, posterior weight shift  within functional limits   Extension ASIS surpasses toes, spine of scapulae surpasses heels, uniform spinal curve within functional limits   Side glide Right  within functional limits   Side glide Left  within functional limits   Rotation Right PT observes contralateral shoulder within functional limits   Rotation Left PT observes contralateral shoulder within functional limits     Lower Extremity Strength  Right LE  Left LE    Hip flexion: 5/5 Hip flexion: 5/5   Hip extension:  5/5 Hip extension: 5/5   Hip abduction: 5/5 Hip abduction: 5/5   Hip adduction:  5/5 Hip adduction:  5/5   Knee Flexion 5/5 Knee Flexion 5/5   Knee Extension 5/5 Knee Extension 5/5   Ankle dorsiflexion: 5/5 Ankle dorsiflexion: 5/5   Ankle plantarflexion: 5/5 Ankle plantarflexion: 5/5             Assessment   Patient has attended 20 visits of the [facility] program in May but for various reasons was unable to attend wellness.  We saw her today for evaluation, and she presents with excellent lumbar range of motion.  She is a very compliant and motivated person and  "has been compliant with her home exercise program.  She maintained her back and leg strength and was able to resume previous weights on machines.  She is appropriate to transition to healthy back wellness to complete her year of care where she will be taught how to set up machines, perform exercise at home, and be given weekly wellness information.  She is agreeable with this plan.    Goals:  1. Assess ability to transition to wellness and set patient up (MET 8/26/22)        Plan   Patient did miss 3 months from therapy until now, but has done an excellent job maintaining spinal health, range of motion, strength, back care habits, posture.  She is ready to transition to wellness.      "I certify the need for these services furnished under this plan of treatment and while under my care."    ____________________________________  Physician/Referring Practitioner    _______________  Date of Signature            Deedee Hermosillo, PT  8/26/2022      "

## 2022-08-26 NOTE — PROGRESS NOTES
"  [facility] Treatment -eval and transition to wellness     Name: Dasia Henning  Clinic Number: [id number]    Therapy Diagnosis:   Encounter Diagnoses   Name Primary?    Spondylolisthesis of lumbar region     Decreased strength of trunk and back Yes     Physician: Silvia Isbell, *    Visit Date: 2022       Physician Orders: PT Eval and Treat   Medical Diagnosis from Referral: M43.16 (ICD-10-CM) - Spondylolisthesis of lumbar region  Evaluation Date: 22  Authorization Period Expiration: 23  Plan of Care Expiration:  22 for therapy, transition to wellness  Visit # / Visits authorized:        Time In  1:55 pm  Time Out: 2:59 pm  Total Billable Time: 60 minutes     Precautions: Standard /L spiral ankle fracture (surgical intervention)  Pattern of pain determined: 1PEP    Subjective   Dasia attended healthy back from 2022 through May of 2022.  She had planned to attend wellness but for various reasons was unable to do so.  She attends today for evaluation and transition to wellness.  She reports she continues to watch her posture, use a lumbar roll, lift correctly, and exercise at a gym in [place] that has [employer] equipment.  She has been consistent with her exercise program at home and stretches daily.  She had no back pain today. Her goal is to attend wellness until the end of her year of care and become independent with setting up machines, for the next stage of her wellbeing and exercise goals.    Patient reports their pain to be 0/10 on a 0-10 scale with 0 being no pain and 10 being the worst pain imaginable.  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  Leisure: tennis/walking /works out with a  2x/wk    Pts goals: "learn how to set up machines and be indep with exercise"    Objective     Baseline Isometric Testing on Med X equipment: Testing administered by PT  Date of testin21  ROM 12-42 deg   Max Peak Torque 168 "    Min Peak Torque 72    Flex/Ext Ratio 2.3:1   % above  normative data 19   17% below at 12 degrees      Midpoint Isometric Testing on Med X equipment: Testing administered by PT  Date of testin22  ROM 3-45 deg   Max Peak Torque 163   Min Peak Torque 73   Flex/Ext Ratio    Total ROM gain from eval  12 degrees   % below average  % loss from initial 3  14%   % above  normative data 12    *14% gain at 12 deg  *Curve appeared much organized, more linear     Final Isometric Testing on Med X equipment: Testing administered by PT  Date of testin2022   ROM 0-51deg   Max Peak Torque 167   Min Peak Torque 97   Flex/Ext Ratio 1.7   Total ROM gain from eval  21 degrees   % strength increase 22   % above  normative data 39       MOVEMENT LOSS back   Norms ROM Loss 22   Flexion Fingers touch toes, sacral angle >/= 70 deg, uniform spinal curvature, posterior weight shift  within functional limits   Extension ASIS surpasses toes, spine of scapulae surpasses heels, uniform spinal curve within functional limits   Side glide Right  within functional limits   Side glide Left  within functional limits   Rotation Right PT observes contralateral shoulder within functional limits   Rotation Left PT observes contralateral shoulder within functional limits     Lower Extremity Strength  Right LE  Left LE    Hip flexion: 5/5 Hip flexion: 5/5   Hip extension:  5/5 Hip extension: 5/5   Hip abduction: 5/5 Hip abduction: 5/5   Hip adduction:  5/5 Hip adduction:  5/5   Knee Flexion 5/5 Knee Flexion 5/5   Knee Extension 5/5 Knee Extension 5/5   Ankle dorsiflexion: 5/5 Ankle dorsiflexion: 5/5   Ankle plantarflexion: 5/5 Ankle plantarflexion: 5/5           Treatment    Pt was instructed in and performed the following:     Dasia received therapeutic exercises to develop/improved posture, cardiovascular endurance, muscular endurance, lumbar  ROM, strength and muscular endurance for 45 minutes including the following exercises:      Review home exercise program-stretching daily  Ext in standing  Ext in lie  Open books  Knees to  Chest  Lower trunk rotation  Piriformis stretch  Posterior pelvic tilts    Review home exercise program-strengthening few times/week  Wellness  She works out at a gym with [employer] 1/week  Nicole Wright    Cardio:  Walking    Fridge magnet with HEP and tips for back care given       Dasia completed peripheral muscle strengthening which included 1 set of 15-20 repetitions at a slow, controlled 10-13 second per rep pace focused on strengthening supporting musculature for improved body mechanics and functional mobility.  Pt and therapist focused on proper form during treatment to ensure optimal strengthening of each targeted muscle group.  Machines were utilized including back medx machine,  torso rotation,  leg curl, chest press, upright row. Tricep extension, bicep curl, leg press, and hip abduction added visit 3    Home Exercises Provided and Patient Education Provided   Fridge magnet and HEP for home given and reviewed   Lumbar roll: obtained and uses in home chair   Do's and Don'ts of lifting given previously        Assessment   Patient has attended 20 visits of the [facility] program in May but for various reasons was unable to attend wellness.  We saw her today for evaluation, and she presents with excellent lumbar range of motion.  She is a very compliant and motivated person and has been compliant with her home exercise program.  She maintained her back and leg strength and was able to resume previous weights on machines.  She is appropriate to transition to healthy back wellness to complete her year of care where she will be taught how to set up machines, perform exercise at home, and be given weekly wellness information.  She is agreeable with this plan.    Goals:  1. Assess ability to transition to wellness and set patient up (MET 8/26/22)        Plan   Patient did miss 3 months from therapy until now, but  has done an excellent job maintaining spinal health, range of motion, strength, back care habits, posture.  She is ready to transition to wellness.      Deedee Hermosillo, PT  8/26/2022

## 2022-08-26 NOTE — PATIENT INSTRUCTIONS
"HEALTHY BACK TOOLS        KEEP YOUR SPINE FEELING FINE   HEALTHY HABITS   Do your "GO TO" stretches 2/day   Get a good night's REST   Watch your POSTURE in sitting/standing Drink PLENTY of water   Use a lumbar roll Eat LOTS of fruits & vegetables   GET UP often (walk and/or stretch) Manage your STRESS   Make your workplace IDEAL FOR YOU  Don't smoke   Lift correctly EXERCISE                           WHAT TO DO WHEN SYMPTOMS FLARE UP  Back and neck pain may occasionally flare up.  If you experience a flare   up, remember your tools. Be encouraged, by remembering that flare-ups will   usually pass.   My Tools:    ~Use your "Go To" Stretches/Positions   ~Keep Moving-pain usually gets better if you move  ~Z lie (with or without ice)  10 min several times a day until symptoms reduce  ~Slowly resume normal activities   ~Practice Deep Breathing and Relaxation techniques                                                 MY EXERCISE PLAN  GO TO STRETCHES  2/day (like brushing your teeth) STRENGTHENING  2-3 times/week CARDIO PROGRAM     Backward bend in standing Wellness 1/week Walk    Back stretch lying down on stomach  Gym 1/week     Knees to chest lying down on back  Clams      Posterior pelvic tilts lying on back  Bridge      Lower trunk rotation lying on back       Open books     Piriformis stretches         "

## 2022-09-08 ENCOUNTER — LAB VISIT (OUTPATIENT)
Dept: LAB | Facility: OTHER | Age: 67
End: 2022-09-08
Attending: INTERNAL MEDICINE
Payer: COMMERCIAL

## 2022-09-08 DIAGNOSIS — F51.01 PRIMARY INSOMNIA: ICD-10-CM

## 2022-09-08 DIAGNOSIS — M85.89 OSTEOPENIA OF MULTIPLE SITES: ICD-10-CM

## 2022-09-08 DIAGNOSIS — E78.5 HYPERLIPIDEMIA, UNSPECIFIED HYPERLIPIDEMIA TYPE: ICD-10-CM

## 2022-09-08 LAB
25(OH)D3+25(OH)D2 SERPL-MCNC: 37 NG/ML (ref 30–96)
ALBUMIN SERPL BCP-MCNC: 3.8 G/DL (ref 3.5–5.2)
ALP SERPL-CCNC: 84 U/L (ref 55–135)
ALT SERPL W/O P-5'-P-CCNC: 23 U/L (ref 10–44)
ANION GAP SERPL CALC-SCNC: 8 MMOL/L (ref 8–16)
AST SERPL-CCNC: 18 U/L (ref 10–40)
BILIRUB SERPL-MCNC: 0.6 MG/DL (ref 0.1–1)
BUN SERPL-MCNC: 18 MG/DL (ref 8–23)
CALCIUM SERPL-MCNC: 9.6 MG/DL (ref 8.7–10.5)
CHLORIDE SERPL-SCNC: 107 MMOL/L (ref 95–110)
CHOLEST SERPL-MCNC: 249 MG/DL (ref 120–199)
CHOLEST/HDLC SERPL: 4.7 {RATIO} (ref 2–5)
CO2 SERPL-SCNC: 25 MMOL/L (ref 23–29)
CREAT SERPL-MCNC: 0.8 MG/DL (ref 0.5–1.4)
EST. GFR  (NO RACE VARIABLE): >60 ML/MIN/1.73 M^2
GLUCOSE SERPL-MCNC: 106 MG/DL (ref 70–110)
HDLC SERPL-MCNC: 53 MG/DL (ref 40–75)
HDLC SERPL: 21.3 % (ref 20–50)
LDLC SERPL CALC-MCNC: 168.2 MG/DL (ref 63–159)
NONHDLC SERPL-MCNC: 196 MG/DL
POTASSIUM SERPL-SCNC: 4.1 MMOL/L (ref 3.5–5.1)
PROT SERPL-MCNC: 7 G/DL (ref 6–8.4)
SODIUM SERPL-SCNC: 140 MMOL/L (ref 136–145)
TRIGL SERPL-MCNC: 139 MG/DL (ref 30–150)
TSH SERPL DL<=0.005 MIU/L-ACNC: 1.07 UIU/ML (ref 0.4–4)

## 2022-09-08 PROCEDURE — 80061 LIPID PANEL: CPT | Performed by: INTERNAL MEDICINE

## 2022-09-08 PROCEDURE — 84443 ASSAY THYROID STIM HORMONE: CPT | Performed by: INTERNAL MEDICINE

## 2022-09-08 PROCEDURE — 80053 COMPREHEN METABOLIC PANEL: CPT | Performed by: INTERNAL MEDICINE

## 2022-09-08 PROCEDURE — 36415 COLL VENOUS BLD VENIPUNCTURE: CPT | Performed by: INTERNAL MEDICINE

## 2022-09-08 PROCEDURE — 82306 VITAMIN D 25 HYDROXY: CPT | Performed by: INTERNAL MEDICINE

## 2022-09-22 ENCOUNTER — DOCUMENTATION ONLY (OUTPATIENT)
Dept: REHABILITATION | Facility: OTHER | Age: 67
End: 2022-09-22
Attending: INTERNAL MEDICINE
Payer: COMMERCIAL

## 2022-09-22 NOTE — PROGRESS NOTES
Health  Wellness Visit Note    Name: Dasia Henning  Clinic Number: [id number]  Physician: No ref. provider found  Diagnosis: No diagnosis found.  Past Medical History:   Diagnosis Date    Anxiety     Depression     Fracture of lower leg      Visit Number: 22  Precautions: L Sprained ankle/fracture      1st PT visit:  11/18/2022  Year of care end date:  TBD (awaiting extension approval)  6 Month test  performed: TBD  12 Month test performed: TBD  Mind body plan:2F  Patient level:NP    Time In: 7:52 AM   Time Out: 8:50 AM  Total Treatment Time: 48 minutes    Wellness [employer] 2022  Handout on this week's wellness topic Taking Ownership of your Overall Wellness was provided along with a discussion on what it means, the benefits, and suggestions for practice.  Reviewed last week's topic of NA-first wellness visit.    Subjective:   Patient reports for her first wellness visit loosening her back as the day goes on. She sees a  1x per week for strengthening. She stretches every morning. She also walks around the neighborhood during the week.  She signed the liability waiver.  HC messaged Deedee to see if a [facility] extension will be granted since pt missed a couple months due to medical complications: skin cancer and diverticulitis.    Objective:   Dasia completed therapeutic stretches (EIL, MARCIO) and the following MedX exercise machines: core lumbar, torso rotation l/r, leg extension, leg curl, upright row, chest press, biceps curl, triceps extension, leg press    See exercise log in patient folder for rate of exertion and repetitions completed.       Fitness Machine Education Key:  E=education on equipment initiated and further follow up and education needed  I=independent with  and exercise.  The patient:  Adjusts machines to his/her settings  Uses equipment levers, pins, weights safely  Maintains safe and correct posture while exercising  Moves through exercise with correct pace and  control  Gets on and off equipment safely      Lumbar/Cervical Ext.  Torso Rotation  Leg Press    Leg Extension  Seated Leg Curl  Chest Press    Seated Row  Hip ADD  Hip ABD    Triceps Extension  Bicep Curl  Other:        Assessment:   Patient tolerated Patient tolerated MedX Core Lumbar Strength and all other peripheral exercises without an increase in symptoms. Patient warmed up on Recumbent Bike for 5 minutes, stretched, and iced low back for 5 minutes after the workout.     Plan:  Continue with established plan of care towards wellness goals.     Health  : Haley Leavitt  9/22/2022

## 2022-09-30 ENCOUNTER — DOCUMENTATION ONLY (OUTPATIENT)
Dept: REHABILITATION | Facility: OTHER | Age: 67
End: 2022-09-30
Attending: INTERNAL MEDICINE
Payer: COMMERCIAL

## 2022-09-30 NOTE — PROGRESS NOTES
Health  Wellness Visit Note    Name: Dasia Henning  Clinic Number: [id number]  Physician: No ref. provider found  Diagnosis: No diagnosis found.  Past Medical History:   Diagnosis Date    Anxiety     Depression     Fracture of lower leg      Visit Number: 22  Precautions: L Sprained ankle/fracture      1st PT visit:  11/18/2022  Year of care end date:  11/18/2023  6 Month test  performed: N/A  12 Month test performed: November 2023  Mind body plan:2F  Patient level:NP    Time In: 12:20 PM   Time Out: 1:00 PM  Total Treatment Time: 40 minutes    Wellness [employer] 2022  Handout on this week's wellness topic Addiction was provided along with a discussion on what it means, the benefits, and suggestions for practice.  Reviewed last week's topic of Taking Ownership of your Overall Wellness    Subjective:   Patient reports that her low back feels good today. Her exercise includes walking regularly in her neighborhood and seeing a  1x/week.  Objective:   Dasia completed therapeutic stretches (EIL, MARCIO) and the following MedX exercise machines: core lumbar, torso rotation l/r, leg extension, leg curl, upright row, chest press, biceps curl, triceps extension, leg press    See exercise log in patient folder for rate of exertion and repetitions completed.       Fitness Machine Education Key:  E=education on equipment initiated and further follow up and education needed  I=independent with  and exercise.  The patient:  Adjusts machines to his/her settings  Uses equipment levers, pins, weights safely  Maintains safe and correct posture while exercising  Moves through exercise with correct pace and control  Gets on and off equipment safely      Lumbar/Cervical Ext.  Torso Rotation  Leg Press    Leg Extension  Seated Leg Curl  Chest Press    Seated Row  Hip ADD  Hip ABD    Triceps Extension  Bicep Curl  Other:        Assessment:   Patient tolerated Patient tolerated MedX Core Lumbar Strength and all  other peripheral exercises without an increase in symptoms. Patient warmed up on Recumbent Bike for 5 minutes, stretched, and iced low back for 5 minutes after the workout.     Plan:  Continue with established plan of care towards wellness goals.     Health  : Dian Javier  9/30/2022

## 2022-10-05 ENCOUNTER — DOCUMENTATION ONLY (OUTPATIENT)
Dept: REHABILITATION | Facility: OTHER | Age: 67
End: 2022-10-05
Attending: INTERNAL MEDICINE
Payer: COMMERCIAL

## 2022-10-05 NOTE — PROGRESS NOTES
Health  Wellness Visit Note    Name: Dasia Henning  Clinic Number: [id number]  Physician: No ref. provider found  Diagnosis: No diagnosis found.  Past Medical History:   Diagnosis Date    Anxiety     Depression     Fracture of lower leg      Visit Number: 24  Precautions: L Sprained ankle/fracture      1st PT visit:  11/18/2022  Year of care end date:  11/18/2023  6 Month test  performed: N/A  12 Month test performed: December 2023  Mind body plan: Plan A-2 months  Patient level:NP    Time In: 3:00 PM   Time Out: 3:40 PM  Total Treatment Time: 40 minutes    Wellness [employer] 2022  Handout on this week's wellness topic Relationships was provided along with a discussion on what it means, the benefits, and suggestions for practice.  Reviewed last week's topic of Addiction.    Subjective:   Patient reports that her low back feels good today. Her exercise includes walking regularly in her neighborhood and seeing a  1x/week.  Entered CC into [email or web address] [id number].    Objective:   Dasia completed therapeutic stretches (EIL, MARCIO) and the following MedX exercise machines: core lumbar, torso rotation l/r, leg extension, leg curl, upright row, chest press, biceps curl, triceps extension, leg press    See exercise log in patient folder for rate of exertion and repetitions completed.       Fitness Machine Education Key:  E=education on equipment initiated and further follow up and education needed  I=independent with  and exercise.  The patient:  Adjusts machines to his/her settings  Uses equipment levers, pins, weights safely  Maintains safe and correct posture while exercising  Moves through exercise with correct pace and control  Gets on and off equipment safely      Lumbar/Cervical Ext.  Torso Rotation  Leg Press    Leg Extension  Seated Leg Curl  Chest Press    Seated Row  Hip ADD  Hip ABD    Triceps Extension  Bicep Curl  Other:        Assessment:   Patient tolerated Patient tolerated MedX Core Lumbar  Strength and all other peripheral exercises without an increase in symptoms. Patient warmed up on Recumbent Bike for 5 minutes, stretched, and iced low back for 5 minutes after the workout.     Plan:  Continue with established plan of care towards wellness goals.     Health  : Haley Leavitt  10/5/2022

## 2022-10-12 ENCOUNTER — DOCUMENTATION ONLY (OUTPATIENT)
Dept: REHABILITATION | Facility: OTHER | Age: 67
End: 2022-10-12
Payer: COMMERCIAL

## 2022-10-19 ENCOUNTER — DOCUMENTATION ONLY (OUTPATIENT)
Dept: REHABILITATION | Facility: OTHER | Age: 67
End: 2022-10-19
Attending: INTERNAL MEDICINE
Payer: COMMERCIAL

## 2022-10-19 NOTE — PROGRESS NOTES
Health  Wellness Visit Note    Name: Dasia Henning  Clinic Number: [id number]  Physician: No ref. provider found  Diagnosis: No diagnosis found.  Past Medical History:   Diagnosis Date    Anxiety     Depression     Fracture of lower leg      Visit Number: 25  Precautions: L Sprained ankle/fracture      1st PT visit:  11/18/2022  Year of care end date:  11/18/2023  6 Month test  performed: N/A  12 Month test performed: December 2023  Mind body plan: Plan A-2 months  Patient level:NP    Time In: 8:30 AM   Time Out: 9:20 AM  Total Treatment Time: 50 minutes    Wellness [employer] 2022  Handout on this week's wellness topic Self-Assessment was provided along with a discussion on what it means, the benefits, and suggestions for practice.  Reviewed last week's topic of NA-missed last week (no show).    Subjective:   Patient reports that her low back feels good today and her knee bothers when she does leg extensions. She sees a  1x/week and also goes on walks.       Objective:   Dasia completed therapeutic stretches (EIL, MARCIO) and the following MedX exercise machines: core lumbar, torso rotation l/r, leg extension, leg curl, upright row, chest press, biceps curl, triceps extension, leg press    See exercise log in patient folder for rate of exertion and repetitions completed.       Fitness Machine Education Key:  E=education on equipment initiated and further follow up and education needed  I=independent with  and exercise.  The patient:  Adjusts machines to his/her settings  Uses equipment levers, pins, weights safely  Maintains safe and correct posture while exercising  Moves through exercise with correct pace and control  Gets on and off equipment safely      Lumbar Ext.  Torso Rotation  Leg Press    Leg Extension E Seated Leg Curl E Chest Press    Seated Row  Hip ADD X Hip ABD E   Triceps Extension  Bicep Curl  Other:        Assessment:   Patient tolerated Patient tolerated MedX Core Lumbar  Strength and all other peripheral exercises without an increase in symptoms. Patient warmed up on Recumbent Bike for 5 minutes, stretched, and skipped ice.     Plan:  Continue with established plan of care towards wellness goals.     Health  : Haley Leavitt  10/19/2022

## 2022-10-26 ENCOUNTER — DOCUMENTATION ONLY (OUTPATIENT)
Dept: REHABILITATION | Facility: OTHER | Age: 67
End: 2022-10-26
Attending: INTERNAL MEDICINE
Payer: COMMERCIAL

## 2022-11-04 ENCOUNTER — DOCUMENTATION ONLY (OUTPATIENT)
Dept: REHABILITATION | Facility: OTHER | Age: 67
End: 2022-11-04
Attending: INTERNAL MEDICINE
Payer: COMMERCIAL

## 2022-11-04 NOTE — PROGRESS NOTES
Health  Wellness Visit Note    Name: Dasia Henning  Clinic Number: [id number]  Physician: No ref. provider found  Diagnosis: No diagnosis found.  Past Medical History:   Diagnosis Date    Anxiety     Depression     Fracture of lower leg      Visit Number: 27  Precautions: L Sprained ankle/fracture      1st PT visit:  11/18/2022  Year of care end date:  11/18/2023  6 Month test  performed: N/A  12 Month test performed: December 2023  Mind body plan: Plan A-2 months  Patient level: B    Time In: 12:35 PM   Time Out: 1:26  PM  Total Treatment Time: 51 minutes    Wellness [facility] 2022  Handout on this week's wellness topic Requirements was provided along with a discussion on what it means, the benefits, and suggestions for practice.  Reviewed last week's topic of Boundaries.    Subjective:   Patient reports that her low back feels sore from a fall she had when she was out of town. She is still seeing a  1x/week, goes for walks daily and stretches everyday.      Objective:   Dasia completed therapeutic stretches (EIL, MARCIO) and the following MedX exercise machines: core lumbar, leg extension, leg curl, upright row, chest press, biceps curl, triceps extension, leg press. Did not do torso rotation, leg press and core lumbar.    See exercise log in patient folder for rate of exertion and repetitions completed.       Fitness Machine Education Key:  E=education on equipment initiated and further follow up and education needed  I=independent with  and exercise.  The patient:  Adjusts machines to his/her settings  Uses equipment levers, pins, weights safely  Maintains safe and correct posture while exercising  Moves through exercise with correct pace and control  Gets on and off equipment safely      Lumbar Ext.  Torso Rotation  Leg Press    Leg Extension E Seated Leg Curl E Chest Press E   Seated Row E Hip ADD X Hip ABD E   Triceps Extension E Bicep Curl E Other:        Assessment:   Patient  tolerated Patient tolerated MedX Core Lumbar Strength and all other peripheral exercises without an increase in symptoms. Patient warmed up on Recumbent Bike for 5 minutes, stretched, and skipped ice.     Plan:  Continue with established plan of care towards wellness goals.     Health  : Vasyl Skelton  11/4/2022

## 2022-11-30 ENCOUNTER — DOCUMENTATION ONLY (OUTPATIENT)
Dept: REHABILITATION | Facility: OTHER | Age: 67
End: 2022-11-30
Payer: COMMERCIAL

## 2022-11-30 NOTE — PROGRESS NOTES
Health  Wellness Visit Note    Name: Dasia Henning  Clinic Number: [id number]  Physician: No ref. provider found  Diagnosis: No diagnosis found.  Past Medical History:   Diagnosis Date    Anxiety     Depression     Fracture of lower leg      Visit Number: 28  Precautions: L Sprained ankle/fracture      1st PT visit:  11/18/2022  Year of care end date:  11/18/2023  6 Month test  performed: N/A  12 Month test performed: December 2023  Mind body plan: Plan A-2 months  Patient level: B    Time In: 8:58 PM   Time Out: 9:05  PM  Total Treatment Time: 7 minutes    Wellness [employer] 2022  Handout on this week's wellness topic Move More, Sleep Better was provided along with a discussion on what it means, the benefits, and suggestions for practice.  Reviewed last week's topic of NA (patient was out last week)    Subjective:   Patient reports that her low back feels sore from a fall she had when she was out of town. She is still seeing a  1x/week, goes for walks daily and stretches everyday. Patient was given a list of all the Wellness Machines and she took pictures of the machines.       Objective:   Dasia completed therapeutic stretches (EIL, MARCIO) and the following MedX exercise machines: core lumbar, leg extension, leg curl, upright row, chest press, biceps curl, triceps extension, leg press. Did not do torso rotation, leg press and core lumbar.    See exercise log in patient folder for rate of exertion and repetitions completed.       Fitness Machine Education Key:  E=education on equipment initiated and further follow up and education needed  I=independent with  and exercise.  The patient:  Adjusts machines to his/her settings  Uses equipment levers, pins, weights safely  Maintains safe and correct posture while exercising  Moves through exercise with correct pace and control  Gets on and off equipment safely      Lumbar Ext.  Torso Rotation  Leg Press    Leg Extension E Seated Leg Curl E  Chest Press E   Seated Row E Hip ADD X Hip ABD E   Triceps Extension E Bicep Curl E Other:        Assessment:   Patient tolerated Patient tolerated MedX Core Lumbar Strength and all other peripheral exercises without an increase in symptoms. Patient warmed up on Recumbent Bike for 5 minutes, stretched, and skipped ice.     Plan:  Continue with established plan of care towards wellness goals.     Health  : Vasyl Skelton  11/30/2022

## 2022-11-30 NOTE — PATIENT INSTRUCTIONS
My Home Exercise Program      Lumbar Extension      Cervical Extension      Torso      Chest Press      Seated Row      Triceps Extension      Bicep Curls      Leg Press      Leg Extension      Leg Curls      Hip Abduction      Hip Adduction

## 2022-12-08 ENCOUNTER — LAB VISIT (OUTPATIENT)
Dept: LAB | Facility: OTHER | Age: 67
End: 2022-12-08
Attending: INTERNAL MEDICINE
Payer: COMMERCIAL

## 2022-12-08 DIAGNOSIS — R10.32 LEFT LOWER QUADRANT ABDOMINAL PAIN: ICD-10-CM

## 2022-12-08 LAB
ANION GAP SERPL CALC-SCNC: 8 MMOL/L (ref 8–16)
BASOPHILS # BLD AUTO: 0.04 K/UL (ref 0–0.2)
BASOPHILS NFR BLD: 0.4 % (ref 0–1.9)
BUN SERPL-MCNC: 16 MG/DL (ref 8–23)
CALCIUM SERPL-MCNC: 9.5 MG/DL (ref 8.7–10.5)
CHLORIDE SERPL-SCNC: 106 MMOL/L (ref 95–110)
CO2 SERPL-SCNC: 27 MMOL/L (ref 23–29)
CREAT SERPL-MCNC: 0.8 MG/DL (ref 0.5–1.4)
DIFFERENTIAL METHOD: NORMAL
EOSINOPHIL # BLD AUTO: 0.1 K/UL (ref 0–0.5)
EOSINOPHIL NFR BLD: 1 % (ref 0–8)
ERYTHROCYTE [DISTWIDTH] IN BLOOD BY AUTOMATED COUNT: 13.5 % (ref 11.5–14.5)
EST. GFR  (NO RACE VARIABLE): >60 ML/MIN/1.73 M^2
GLUCOSE SERPL-MCNC: 82 MG/DL (ref 70–110)
HCT VFR BLD AUTO: 38.8 % (ref 37–48.5)
HGB BLD-MCNC: 13 G/DL (ref 12–16)
IMM GRANULOCYTES # BLD AUTO: 0.03 K/UL (ref 0–0.04)
IMM GRANULOCYTES NFR BLD AUTO: 0.3 % (ref 0–0.5)
LYMPHOCYTES # BLD AUTO: 2.6 K/UL (ref 1–4.8)
LYMPHOCYTES NFR BLD: 29.2 % (ref 18–48)
MCH RBC QN AUTO: 28.8 PG (ref 27–31)
MCHC RBC AUTO-ENTMCNC: 33.5 G/DL (ref 32–36)
MCV RBC AUTO: 86 FL (ref 82–98)
MONOCYTES # BLD AUTO: 0.6 K/UL (ref 0.3–1)
MONOCYTES NFR BLD: 6.9 % (ref 4–15)
NEUTROPHILS # BLD AUTO: 5.5 K/UL (ref 1.8–7.7)
NEUTROPHILS NFR BLD: 62.2 % (ref 38–73)
NRBC BLD-RTO: 0 /100 WBC
PLATELET # BLD AUTO: 301 K/UL (ref 150–450)
PMV BLD AUTO: 9.2 FL (ref 9.2–12.9)
POTASSIUM SERPL-SCNC: 4.1 MMOL/L (ref 3.5–5.1)
RBC # BLD AUTO: 4.51 M/UL (ref 4–5.4)
SODIUM SERPL-SCNC: 141 MMOL/L (ref 136–145)
WBC # BLD AUTO: 8.93 K/UL (ref 3.9–12.7)

## 2022-12-08 PROCEDURE — 80048 BASIC METABOLIC PNL TOTAL CA: CPT | Performed by: INTERNAL MEDICINE

## 2022-12-08 PROCEDURE — 36415 COLL VENOUS BLD VENIPUNCTURE: CPT | Performed by: INTERNAL MEDICINE

## 2022-12-08 PROCEDURE — 85025 COMPLETE CBC W/AUTO DIFF WBC: CPT | Performed by: INTERNAL MEDICINE

## 2022-12-09 ENCOUNTER — HOSPITAL ENCOUNTER (OUTPATIENT)
Dept: RADIOLOGY | Facility: OTHER | Age: 67
Discharge: HOME | End: 2022-12-09
Attending: INTERNAL MEDICINE
Payer: COMMERCIAL

## 2022-12-09 DIAGNOSIS — R10.32 LEFT LOWER QUADRANT PAIN: ICD-10-CM

## 2022-12-09 PROCEDURE — 25500020 PHARM REV CODE 255: Performed by: INTERNAL MEDICINE

## 2022-12-09 PROCEDURE — 74177 CT ABD & PELVIS W/CONTRAST: CPT | Mod: TC

## 2022-12-09 PROCEDURE — 74177 CT ABD & PELVIS W/CONTRAST: CPT | Mod: 26,,, | Performed by: RADIOLOGY

## 2022-12-09 PROCEDURE — 74177 CT ABDOMEN PELVIS WITH CONTRAST: ICD-10-PCS | Mod: 26,,, | Performed by: RADIOLOGY

## 2022-12-09 RX ADMIN — IOHEXOL 25 ML: 350 INJECTION, SOLUTION INTRAVENOUS at 07:12

## 2022-12-09 RX ADMIN — IOHEXOL 75 ML: 350 INJECTION, SOLUTION INTRAVENOUS at 07:12

## 2023-05-04 ENCOUNTER — HOSPITAL ENCOUNTER (EMERGENCY)
Facility: OTHER | Age: 68
Discharge: HOME | End: 2023-05-04
Attending: EMERGENCY MEDICINE
Payer: COMMERCIAL

## 2023-05-04 VITALS
TEMPERATURE: 98 F | HEIGHT: 67 IN | RESPIRATION RATE: 16 BRPM | SYSTOLIC BLOOD PRESSURE: 172 MMHG | WEIGHT: 188 LBS | HEART RATE: 75 BPM | OXYGEN SATURATION: 95 % | BODY MASS INDEX: 29.51 KG/M2 | DIASTOLIC BLOOD PRESSURE: 78 MMHG

## 2023-05-04 DIAGNOSIS — R07.89 ATYPICAL CHEST PAIN: Primary | ICD-10-CM

## 2023-05-04 DIAGNOSIS — R07.9 CHEST PAIN: ICD-10-CM

## 2023-05-04 DIAGNOSIS — R68.84 JAW PAIN: ICD-10-CM

## 2023-05-04 LAB
ALBUMIN SERPL BCP-MCNC: 3.9 G/DL (ref 3.5–5.2)
ALP SERPL-CCNC: 77 U/L (ref 55–135)
ALT SERPL W/O P-5'-P-CCNC: 48 U/L (ref 10–44)
ANION GAP SERPL CALC-SCNC: 8 MMOL/L (ref 8–16)
AST SERPL-CCNC: 30 U/L (ref 10–40)
BASOPHILS # BLD AUTO: 0.04 K/UL (ref 0–0.2)
BASOPHILS NFR BLD: 0.5 % (ref 0–1.9)
BILIRUB SERPL-MCNC: 0.2 MG/DL (ref 0.1–1)
BNP SERPL-MCNC: 22 PG/ML (ref 0–99)
BUN SERPL-MCNC: 20 MG/DL (ref 8–23)
CALCIUM SERPL-MCNC: 9.8 MG/DL (ref 8.7–10.5)
CHLORIDE SERPL-SCNC: 108 MMOL/L (ref 95–110)
CO2 SERPL-SCNC: 23 MMOL/L (ref 23–29)
CREAT SERPL-MCNC: 0.8 MG/DL (ref 0.5–1.4)
DIFFERENTIAL METHOD: NORMAL
EOSINOPHIL # BLD AUTO: 0.2 K/UL (ref 0–0.5)
EOSINOPHIL NFR BLD: 1.7 % (ref 0–8)
ERYTHROCYTE [DISTWIDTH] IN BLOOD BY AUTOMATED COUNT: 13.5 % (ref 11.5–14.5)
EST. GFR  (NO RACE VARIABLE): >60 ML/MIN/1.73 M^2
GLUCOSE SERPL-MCNC: 95 MG/DL (ref 70–110)
HCT VFR BLD AUTO: 39 % (ref 37–48.5)
HGB BLD-MCNC: 13 G/DL (ref 12–16)
IMM GRANULOCYTES # BLD AUTO: 0.02 K/UL (ref 0–0.04)
IMM GRANULOCYTES NFR BLD AUTO: 0.2 % (ref 0–0.5)
LYMPHOCYTES # BLD AUTO: 2.9 K/UL (ref 1–4.8)
LYMPHOCYTES NFR BLD: 33.2 % (ref 18–48)
MCH RBC QN AUTO: 29.2 PG (ref 27–31)
MCHC RBC AUTO-ENTMCNC: 33.3 G/DL (ref 32–36)
MCV RBC AUTO: 88 FL (ref 82–98)
MONOCYTES # BLD AUTO: 0.6 K/UL (ref 0.3–1)
MONOCYTES NFR BLD: 6.7 % (ref 4–15)
NEUTROPHILS # BLD AUTO: 5.1 K/UL (ref 1.8–7.7)
NEUTROPHILS NFR BLD: 57.7 % (ref 38–73)
NRBC BLD-RTO: 0 /100 WBC
PLATELET # BLD AUTO: 313 K/UL (ref 150–450)
PMV BLD AUTO: 9.3 FL (ref 9.2–12.9)
POTASSIUM SERPL-SCNC: 4.4 MMOL/L (ref 3.5–5.1)
PROT SERPL-MCNC: 7.2 G/DL (ref 6–8.4)
RBC # BLD AUTO: 4.45 M/UL (ref 4–5.4)
SODIUM SERPL-SCNC: 139 MMOL/L (ref 136–145)
TROPONIN I SERPL DL<=0.01 NG/ML-MCNC: <0.006 NG/ML (ref 0–0.03)
WBC # BLD AUTO: 8.85 K/UL (ref 3.9–12.7)

## 2023-05-04 PROCEDURE — 85025 COMPLETE CBC W/AUTO DIFF WBC: CPT | Performed by: PHYSICIAN ASSISTANT

## 2023-05-04 PROCEDURE — 93005 ELECTROCARDIOGRAM TRACING: CPT

## 2023-05-04 PROCEDURE — 99285 EMERGENCY DEPT VISIT HI MDM: CPT | Mod: 25

## 2023-05-04 PROCEDURE — 25000003 PHARM REV CODE 250: Performed by: PHYSICIAN ASSISTANT

## 2023-05-04 PROCEDURE — 93010 ELECTROCARDIOGRAM REPORT: CPT | Mod: ,,, | Performed by: INTERNAL MEDICINE

## 2023-05-04 PROCEDURE — 80053 COMPREHEN METABOLIC PANEL: CPT | Performed by: PHYSICIAN ASSISTANT

## 2023-05-04 PROCEDURE — 93010 EKG 12-LEAD: ICD-10-PCS | Mod: ,,, | Performed by: INTERNAL MEDICINE

## 2023-05-04 PROCEDURE — 84484 ASSAY OF TROPONIN QUANT: CPT | Performed by: PHYSICIAN ASSISTANT

## 2023-05-04 PROCEDURE — 83880 ASSAY OF NATRIURETIC PEPTIDE: CPT | Performed by: PHYSICIAN ASSISTANT

## 2023-05-04 RX ORDER — ASPIRIN 325 MG
325 TABLET ORAL
Status: COMPLETED | OUTPATIENT
Start: 2023-05-04 | End: 2023-05-04

## 2023-05-04 RX ADMIN — ASPIRIN 325 MG ORAL TABLET 325 MG: 325 PILL ORAL at 06:05

## 2023-05-04 NOTE — ED TRIAGE NOTES
Patient states she has been on penicillin x 1 week for c/o left jaw pain, thought to be r/t dental problem. She has had no relief after 7 days of therapy and when she mentioned that she was also having intermittent chest pain, she was advised to come to the ED. Appears well. States she has no major health problems and has been very healthy. No recent illness. Describes CP as intermittent and only lasting a few seconds at a time.

## 2023-05-04 NOTE — FIRST PROVIDER EVALUATION
" Emergency Department TeleTriage Encounter Note      CHIEF COMPLAINT    Chief Complaint   Patient presents with    Jaw Pain     Pt reports left side jaw pain and "twinges" of chest pain x 1 week. On anb for possible tooth infection.     Chest Pain       VITAL SIGNS   Initial Vitals [05/04/23 1729]   BP Pulse Resp Temp SpO2   (!) 167/84 81 20 98.4 °F (36.9 °C) 97 %      MAP       --            ALLERGIES    Review of patient's allergies indicates:   Allergen Reactions    Morphine Nausea And Vomiting    Sulfa (sulfonamide antibiotics)        PROVIDER TRIAGE NOTE  Patient presents with left jaw pain for a few days. She has had a few twinges of chest pain so advised to come to the ED to rule out cardio involvement.       ORDERS  Labs Reviewed - No data to display    ED Orders (720h ago, onward)      None              Virtual Visit Note: The provider triage portion of this emergency department evaluation and documentation was performed via [employer], a HIPAA-compliant telemedicine application, in concert with a tele-presenter in the room. A face to face patient evaluation with one of my colleagues will occur once the patient is placed in an emergency department room.      DISCLAIMER: This note was prepared with [employer] voice recognition transcription software. Garbled syntax, mangled pronouns, and other bizarre constructions may be attributed to that software system.    "

## 2023-05-05 NOTE — ED NOTES
Acknowledge transfer of care of patient. Patient resting in bed with no acute distress noted. Alert and oriented x 3,  and follows commands. Respirations easy and unlabored. Able to make needs known, updated on plan of care. Cart in low position, side rails up x 2 and call bell in reach. Will continue to monitor

## 2023-05-05 NOTE — ED PROVIDER NOTES
"SCRIBE #1 NOTE: I, Princess Greenfield, am scribing for, and in the presence of,  Armand Meyer DO.       Source of History:  Patient.    Chief complaint:  Jaw Pain (Pt reports left side jaw pain and "twinges" of chest pain x 1 week. On abx for possible tooth infection. ) and Chest Pain      HPI:  Dasia Henning is a 67 y.o. female presenting with constant left-sided jaw pain and episodic chest pain for the past 1 week. She saw her dentist on  and received an X-ray showing no evidence of fracture. She states Advil relieves the pain but she took Aleve today with no relief. She has brief episodes of left-sided chest pain lasting a few seconds, without clear inciting factors. She denies nausea or vomiting. She denies shortness of breath, chills, or recent cold or cough. She denies appetite changes.     This is the extent to the patients complaints today here in the emergency department.    ROS:   See HPI.    Review of patient's allergies indicates:   Allergen Reactions    Morphine Nausea And Vomiting    Sulfa (sulfonamide antibiotics)        PMH:  As per HPI and below:  Past Medical History:   Diagnosis Date    Anxiety     Depression     Fracture of lower leg      Past Surgical History:   Procedure Laterality Date    ANKLE FRACTURE SURGERY      APPENDECTOMY       SECTION      knee ascope      left    OVARIAN CYST REMOVAL         Social History     Tobacco Use    Smoking status: Former     Types: Cigarettes    Smokeless tobacco: Never   Substance Use Topics    Alcohol use: Yes     Alcohol/week: 5.8 standard drinks     Types: 7 Standard drinks or equivalent per week    Drug use: No       Physical Exam:    BP (!) 172/78 (BP Location: Right arm, Patient Position: Sitting)   Pulse 75   Temp 98.3 °F (36.8 °C) (Oral)   Resp 16   Ht 5' 7" (1.702 m)   Wt 85.3 kg (188 lb)   SpO2 95%   BMI 29.44 kg/m²   Nursing note and vital signs reviewed.  Constitutional: No acute distress.  Nontoxic  Cardiovascular:  Regular " rate and rhythm no murmurs gallops or rubs  Chest/ Respiratory:  Clear to auscultation bilaterally without wheezing rhonchi or rales. No reproducible chest tenderness.   Abdomen: Soft.  Not distended.  Nontender.  No guarding.  No rebound. Non-peritoneal.  Extremities: No pitting edema  Neuro: Alert. No focal deficits.  Skin: no rash noted  HENT: Tenderness to palpation to left TM joint.     I decided to obtain the patient's medical records.  Summary of Medical Records:      MDM/ Differential Dx:  67-year-old female presents with chest pain.  Pain is atypical.  It is very intermittent in midsternal.  Not exacerbated by anything.  She has no red flags such as diaphoresis, exertional shortness of breath, radiation or vomiting.  She has complained of jaw pain for the last week that this is been constant has not been related to the chest pain.  I suspect TMJ.  She is a history of TMJ and has not been using her bite block.  No evidence of infection.  I have considered but have extremely low suspicion of acute coronary syndrome.  Heart score is 3.    Additional MDM:   Heart Score:    History:          Slightly suspicious.  ECG:             Normal  Age:               >65 years  Risk factors: 1-2 risk factors  Troponin:       Less than or equal to normal limit  Final Score: 3       Social Concerns:      ED Course as of 05/04/23 1940   Thu May 04, 2023   1844 X-Ray Chest AP Portable  Chest x-ray independently interpreted by myself shows normal cardiac size, no infiltrate or focal consolidation, no pneumothorax, no bony abnormalities.  Overall impression negative chest x-ray. [SM]   1904 Sodium: 139 [SM]   1904 Potassium: 4.4 [SM]   1904 Creatinine: 0.8 [SM]   1904 BNP: 22 [SM]   1904 Troponin I: <0.006 [SM]   1904 CBC auto differential  Normal []   1910 I spoke with the patient's doctor Dr. Reeves and will discharge the patient.  Dr. Reeves will arrange outpatient stress test. [SM]   1910 No further workup is  indicated in the emergency department today.  I updated pt regarding results and I counseled pt regarding supportive care measures.  Diagnosis and treatment plan explained to patient. I have answered all questions and the patient is satisfied with the plan of care. Patient discharged home in stable condition.  [SM]      ED Course User Index  [SM] Armand Meyer DO              Scribotis Attestation:   Scribe #1: I performed the above scribed service and the documentation accurately describes the services I performed. I attest to the accuracy of the note.  Physician Attestation for Scribe: I, Dr Armand Meyer, reviewed documentation as scribed in my presence, which is both accurate and complete.    Diagnostic Impression:    1. Atypical chest pain    2. Chest pain    3. Jaw pain         ED Disposition Condition    Discharge Stable            ED Prescriptions    None       Follow-up Information       Follow up With Specialties Details Why Contact Info    Silvia Isbell MD Internal Medicine Call in 1 day she will set up outpatient stress test. [address] [address]  [phone]               Armand Meyer DO  05/04/23 1940

## 2023-07-05 PROBLEM — Z87.19 H/O DIVERTICULITIS OF COLON: Status: ACTIVE | Noted: 2023-07-05

## 2023-07-05 PROBLEM — N95.2 VAGINAL ATROPHY: Status: ACTIVE | Noted: 2023-07-05

## 2023-09-12 ENCOUNTER — HOSPITAL ENCOUNTER (OUTPATIENT)
Dept: RADIOLOGY | Facility: OTHER | Age: 68
Discharge: HOME | End: 2023-09-12
Attending: ORTHOPAEDIC SURGERY
Payer: COMMERCIAL

## 2023-09-12 DIAGNOSIS — M25.562 PAIN IN JOINT OF LEFT KNEE: ICD-10-CM

## 2023-09-12 PROCEDURE — 73721 MRI JNT OF LWR EXTRE W/O DYE: CPT | Mod: 26,LT,, | Performed by: RADIOLOGY

## 2023-09-12 PROCEDURE — 73721 MRI KNEE WITHOUT CONTRAST LEFT: ICD-10-PCS | Mod: 26,LT,, | Performed by: RADIOLOGY

## 2023-09-12 PROCEDURE — 73721 MRI JNT OF LWR EXTRE W/O DYE: CPT | Mod: TC,LT

## 2023-10-25 ENCOUNTER — ANESTHESIA EVENT (OUTPATIENT)
Dept: SURGERY | Facility: OTHER | Age: 68
End: 2023-10-25
Payer: COMMERCIAL

## 2023-10-25 ENCOUNTER — HOSPITAL ENCOUNTER (OUTPATIENT)
Dept: PREADMISSION TESTING | Facility: OTHER | Age: 68
Discharge: HOME | End: 2023-10-25
Attending: ORTHOPAEDIC SURGERY
Payer: COMMERCIAL

## 2023-10-25 VITALS
HEART RATE: 78 BPM | RESPIRATION RATE: 18 BRPM | SYSTOLIC BLOOD PRESSURE: 129 MMHG | WEIGHT: 187 LBS | OXYGEN SATURATION: 96 % | BODY MASS INDEX: 29.35 KG/M2 | TEMPERATURE: 98 F | HEIGHT: 67 IN | DIASTOLIC BLOOD PRESSURE: 71 MMHG

## 2023-10-25 DIAGNOSIS — S83.242A ACUTE MEDIAL MENISCUS TEAR OF LEFT KNEE, INITIAL ENCOUNTER: Primary | ICD-10-CM

## 2023-10-25 RX ORDER — FAMOTIDINE 20 MG/1
20 TABLET, FILM COATED ORAL
Status: CANCELLED | OUTPATIENT
Start: 2023-10-25 | End: 2023-10-25

## 2023-10-25 RX ORDER — SODIUM CHLORIDE, SODIUM LACTATE, POTASSIUM CHLORIDE, CALCIUM CHLORIDE 600; 310; 30; 20 MG/100ML; MG/100ML; MG/100ML; MG/100ML
INJECTION, SOLUTION INTRAVENOUS CONTINUOUS
Status: CANCELLED | OUTPATIENT
Start: 2023-10-25

## 2023-10-25 RX ORDER — ACETAMINOPHEN 325 MG/1
975 TABLET ORAL
Status: CANCELLED | OUTPATIENT
Start: 2023-10-25 | End: 2023-10-25

## 2023-10-25 RX ORDER — LIDOCAINE HYDROCHLORIDE 10 MG/ML
0.5 INJECTION, SOLUTION EPIDURAL; INFILTRATION; INTRACAUDAL; PERINEURAL ONCE
Status: CANCELLED | OUTPATIENT
Start: 2023-10-25 | End: 2023-10-25

## 2023-10-25 NOTE — ANESTHESIA PREPROCEDURE EVALUATION
10/25/2023  Dasia Henning is a 68 y.o., female.      Pre-op Assessment    I have reviewed the Patient Summary Reports.     I have reviewed the Nursing Notes. I have reviewed the NPO Status.   I have reviewed the Medications.     Review of Systems  Anesthesia Hx:  No problems with previous Anesthesia    Social:  Non-Smoker    Hematology/Oncology:  Hematology Normal   Oncology Normal     EENT/Dental:EENT/Dental Normal   Cardiovascular:   Exercise tolerance: good    Pulmonary:   Sleep Apnea, CPAP    Hepatic/GI:  Hepatic/GI Normal    Musculoskeletal:  Musculoskeletal Normal    Neurological:  Neurology Normal    Endocrine:  Obesity / BMI > 30  Psych:   Psychiatric History anxiety depression          Physical Exam  General: Cooperative and Alert    Airway:  Mallampati: II   Mouth Opening: Normal  TM Distance: Normal  Tongue: Normal  Neck ROM: Normal ROM    Dental:  Intact        Anesthesia Plan  Type of Anesthesia, risks & benefits discussed:    Anesthesia Type: Gen Supraglottic Airway  Intra-op Monitoring Plan: Standard ASA Monitors  Post Op Pain Control Plan: multimodal analgesia  Induction:  IV  Informed Consent: Informed consent signed with the Patient and all parties understand the risks and agree with anesthesia plan.  All questions answered.   ASA Score: 2  Anesthesia Plan Notes: Pt had her annual wellness exam 6/21/23. All issues addressed. Has recent labs.     Ready For Surgery From Anesthesia Perspective.     .

## 2023-10-25 NOTE — DISCHARGE INSTRUCTIONS
Information to Prepare you for your Surgery    PRE-ADMIT TESTING -  [phone]    [address] [facility]          Your surgery has been scheduled at [facility]. We are pleased to have the opportunity to serve you. For Further Information please call [phone].    On the day of surgery please report to the Information Desk on the 1st floor.    CONTACT YOUR PHYSICIAN'S OFFICE THE DAY PRIOR TO YOUR SURGERY TO OBTAIN YOUR ARRIVAL TIME.     The evening before surgery do not eat anything after 9 p.m. ( this includes hard candy, chewing gum and mints).  You may only have GATORADE, POWERADE AND WATER  from 9 p.m. until you leave your home.   DO NOT DRINK ANY LIQUIDS ON THE WAY TO THE HOSPITAL.      Why does your anesthesiologist allow you to drink Gatorade/Powerade before surgery?  Gatorade/Powerade helps to increase your comfort before surgery and to decrease your nausea after surgery. The carbohydrates in Gatorade/Powerade help reduce your body's stress response to surgery.  If you are a diabetic-drink only water prior to surgery.       Patients may have 2 visitors pre and post procedure. Only 2 visitors will be allowed in the Surgical building with the patient. No one under the age of 12 will be allowed into the facility.    SPECIAL MEDICATION INSTRUCTIONS: TAKE medications checked off by the Anesthesiologist on your Medication List.    Angiogram Patients: Take medications as instructed by your physician, including aspirin.     Surgery Patients:  If you take ASPIRIN - Your PHYSICIAN/SURGEON will need to inform you IF/OR when you need to stop taking aspirin prior to your surgery.     The week prior to surgery do not ot take any medications containing IBUPROFEN or NSAIDS ( Advil, Motrin, Goodys, BC, Aleve, Naproxen etc) If you are not sure if you should take a medicine please call your surgeon's office.  You may take Tylenol.     Do Not Wear any make-up (especially eye make-up) to  surgery. Please remove any false eyelashes or eyelash extensions. If you arrive the day of surgery with makeup/eyelashes on you will be required to remove prior to surgery. (There is a risk of corneal abrasions if eye makeup/eyelash extensions are not removed)      Leave all valuables at home.   Do Not wear any jewelry or watches, including any metal in body piercings. Jewelry must be removed prior to coming to the hospital.  There is a possibility that rings that are unable to be removed may be cut off if they are on the surgical extremity.    Please remove all hair extensions, wigs, clips and any other metal accessories/ ornaments from your hair.  These items may pose a flammable/fire risk in Surgery and must be removed.    Do not shave your surgical area at least 5 days prior to your surgery. The surgical prep will be performed at the hospital according to Infection Control regulations.    Contact Lens must be removed before surgery. Either do not wear the contact lens or bring a case and solution for storage.  Please bring a container for eyeglasses or dentures as required.  Bring any paperwork your physician has provided, such as consent forms,  history and physicals, doctor's orders, etc.   Bring comfortable clothes that are loose fitting to wear upon discharge. Take into consideration the type of surgery being performed.  Maintain your diet as advised per your physician the day prior to surgery.      Adequate rest the night before surgery is advised.   Park in the Parking lot behind the hospital or in the [facility] across the street from the parking lot. Parking is complimentary.  If you will be discharged the same day as your procedure, please arrange for a responsible adult to drive you home or to accompany you if traveling by taxi.   YOU WILL NOT BE PERMITTED TO DRIVE OR TO LEAVE THE HOSPITAL ALONE AFTER SURGERY.   If you are being discharged the same day, it is strongly recommended that you  arrange for someone to remain with you for the first 24 hrs following your surgery.    The Surgeon will speak to your family/visitor after your surgery regarding the outcome of your surgery and post op care.  The Surgeon may speak to you after your surgery, but there is a possibility you may not remember the details.  Please check with your family members regarding the conversation with the Surgeon.    We strongly recommend whoever is bringing you home be present for discharge instructions.  This will ensure a thorough understanding for your post op home care.    ALL CHILDREN MUST ALWAYS BE ACCOMPANIED BY AN ADULT.    Visitors-Refer to current Visitor policy handouts.    Thank you for your cooperation.  The Staff of [facility].            Bathing Instructions with Hibiclens    Shower the evening before and morning of your procedure with Chlorhexidine (Hibiclens)  do not use Chlorhexidine on your face or genitals. Do not get in your eyes.  Wash your face with water and your regular face wash/soap  Use your regular shampoo  Apply Chlorhexidine (Hibiclens) directly on your skin or on a wet washcloth and wash gently. When showering: Move away from the shower stream when applying Chlorhexidine (Hibiclens) to avoid rinsing off too soon.  Rinse thoroughly with warm water  Do not dilute Chlorhexidine (Hibiclens)   Dry off as usual, do not use any deodorant, powder, body lotions, perfume, after shave or cologne.

## 2023-10-27 ENCOUNTER — ANESTHESIA (OUTPATIENT)
Dept: SURGERY | Facility: OTHER | Age: 68
End: 2023-10-27
Payer: COMMERCIAL

## 2023-10-27 ENCOUNTER — HOSPITAL ENCOUNTER (OUTPATIENT)
Facility: OTHER | Age: 68
Discharge: HOME | End: 2023-10-27
Attending: ORTHOPAEDIC SURGERY | Admitting: ORTHOPAEDIC SURGERY
Payer: COMMERCIAL

## 2023-10-27 VITALS
OXYGEN SATURATION: 97 % | RESPIRATION RATE: 20 BRPM | SYSTOLIC BLOOD PRESSURE: 141 MMHG | HEART RATE: 87 BPM | TEMPERATURE: 98 F | DIASTOLIC BLOOD PRESSURE: 63 MMHG

## 2023-10-27 DIAGNOSIS — S83.242A ACUTE MEDIAL MENISCUS TEAR OF LEFT KNEE, INITIAL ENCOUNTER: ICD-10-CM

## 2023-10-27 PROCEDURE — D9220A PRA ANESTHESIA: Mod: CRNA,,, | Performed by: NURSE ANESTHETIST, CERTIFIED REGISTERED

## 2023-10-27 PROCEDURE — 63600175 PHARM REV CODE 636 W HCPCS: Performed by: NURSE ANESTHETIST, CERTIFIED REGISTERED

## 2023-10-27 PROCEDURE — 71000015 HC POSTOP RECOV 1ST HR: Performed by: ORTHOPAEDIC SURGERY

## 2023-10-27 PROCEDURE — 25000003 PHARM REV CODE 250: Performed by: ANESTHESIOLOGY

## 2023-10-27 PROCEDURE — 63600175 PHARM REV CODE 636 W HCPCS: Performed by: ORTHOPAEDIC SURGERY

## 2023-10-27 PROCEDURE — 36000710: Performed by: ORTHOPAEDIC SURGERY

## 2023-10-27 PROCEDURE — 71000033 HC RECOVERY, INTIAL HOUR: Performed by: ORTHOPAEDIC SURGERY

## 2023-10-27 PROCEDURE — 27201423 OPTIME MED/SURG SUP & DEVICES STERILE SUPPLY: Performed by: ORTHOPAEDIC SURGERY

## 2023-10-27 PROCEDURE — D9220A PRA ANESTHESIA: ICD-10-PCS | Mod: CRNA,,, | Performed by: NURSE ANESTHETIST, CERTIFIED REGISTERED

## 2023-10-27 PROCEDURE — D9220A PRA ANESTHESIA: Mod: ANES,,, | Performed by: ANESTHESIOLOGY

## 2023-10-27 PROCEDURE — 63600175 PHARM REV CODE 636 W HCPCS: Performed by: ANESTHESIOLOGY

## 2023-10-27 PROCEDURE — 25000003 PHARM REV CODE 250: Performed by: NURSE ANESTHETIST, CERTIFIED REGISTERED

## 2023-10-27 PROCEDURE — 37000008 HC ANESTHESIA 1ST 15 MINUTES: Performed by: ORTHOPAEDIC SURGERY

## 2023-10-27 PROCEDURE — 71000039 HC RECOVERY, EACH ADD'L HOUR: Performed by: ORTHOPAEDIC SURGERY

## 2023-10-27 PROCEDURE — 37000009 HC ANESTHESIA EA ADD 15 MINS: Performed by: ORTHOPAEDIC SURGERY

## 2023-10-27 PROCEDURE — 71000016 HC POSTOP RECOV ADDL HR: Performed by: ORTHOPAEDIC SURGERY

## 2023-10-27 PROCEDURE — 36000711: Performed by: ORTHOPAEDIC SURGERY

## 2023-10-27 PROCEDURE — 25000003 PHARM REV CODE 250: Performed by: ORTHOPAEDIC SURGERY

## 2023-10-27 PROCEDURE — D9220A PRA ANESTHESIA: ICD-10-PCS | Mod: ANES,,, | Performed by: ANESTHESIOLOGY

## 2023-10-27 RX ORDER — CEFAZOLIN SODIUM 1 G/3ML
2 INJECTION, POWDER, FOR SOLUTION INTRAMUSCULAR; INTRAVENOUS
Status: COMPLETED | OUTPATIENT
Start: 2023-10-27 | End: 2023-10-27

## 2023-10-27 RX ORDER — KETOROLAC TROMETHAMINE 30 MG/ML
INJECTION, SOLUTION INTRAMUSCULAR; INTRAVENOUS
Status: DISCONTINUED | OUTPATIENT
Start: 2023-10-27 | End: 2023-10-27

## 2023-10-27 RX ORDER — ACETAMINOPHEN 325 MG/1
975 TABLET ORAL
Status: COMPLETED | OUTPATIENT
Start: 2023-10-27 | End: 2023-10-27

## 2023-10-27 RX ORDER — DEXAMETHASONE SODIUM PHOSPHATE 4 MG/ML
INJECTION, SOLUTION INTRA-ARTICULAR; INTRALESIONAL; INTRAMUSCULAR; INTRAVENOUS; SOFT TISSUE
Status: DISCONTINUED | OUTPATIENT
Start: 2023-10-27 | End: 2023-10-27

## 2023-10-27 RX ORDER — PROCHLORPERAZINE EDISYLATE 5 MG/ML
5 INJECTION INTRAMUSCULAR; INTRAVENOUS EVERY 30 MIN PRN
Status: DISCONTINUED | OUTPATIENT
Start: 2023-10-27 | End: 2023-10-27 | Stop reason: HOSPADM

## 2023-10-27 RX ORDER — SODIUM CHLORIDE 0.9 % (FLUSH) 0.9 %
3 SYRINGE (ML) INJECTION
Status: DISCONTINUED | OUTPATIENT
Start: 2023-10-27 | End: 2023-10-27 | Stop reason: HOSPADM

## 2023-10-27 RX ORDER — HYDROCODONE BITARTRATE AND ACETAMINOPHEN 5; 325 MG/1; MG/1
1 TABLET ORAL EVERY 4 HOURS PRN
Qty: 10 TABLET | Refills: 0 | Status: SHIPPED | OUTPATIENT
Start: 2023-10-27 | End: 2023-12-18 | Stop reason: SDUPTHER

## 2023-10-27 RX ORDER — ONDANSETRON 2 MG/ML
INJECTION INTRAMUSCULAR; INTRAVENOUS
Status: DISCONTINUED | OUTPATIENT
Start: 2023-10-27 | End: 2023-10-27

## 2023-10-27 RX ORDER — HYDROMORPHONE HYDROCHLORIDE 2 MG/ML
0.4 INJECTION, SOLUTION INTRAMUSCULAR; INTRAVENOUS; SUBCUTANEOUS EVERY 5 MIN PRN
Status: DISCONTINUED | OUTPATIENT
Start: 2023-10-27 | End: 2023-10-27 | Stop reason: HOSPADM

## 2023-10-27 RX ORDER — SODIUM CHLORIDE, SODIUM LACTATE, POTASSIUM CHLORIDE, CALCIUM CHLORIDE 600; 310; 30; 20 MG/100ML; MG/100ML; MG/100ML; MG/100ML
INJECTION, SOLUTION INTRAVENOUS CONTINUOUS
Status: DISCONTINUED | OUTPATIENT
Start: 2023-10-27 | End: 2023-10-27 | Stop reason: HOSPADM

## 2023-10-27 RX ORDER — PROPOFOL 10 MG/ML
VIAL (ML) INTRAVENOUS
Status: DISCONTINUED | OUTPATIENT
Start: 2023-10-27 | End: 2023-10-27

## 2023-10-27 RX ORDER — MIDAZOLAM HYDROCHLORIDE 1 MG/ML
INJECTION INTRAMUSCULAR; INTRAVENOUS
Status: DISCONTINUED | OUTPATIENT
Start: 2023-10-27 | End: 2023-10-27

## 2023-10-27 RX ORDER — FAMOTIDINE 20 MG/1
20 TABLET, FILM COATED ORAL
Status: COMPLETED | OUTPATIENT
Start: 2023-10-27 | End: 2023-10-27

## 2023-10-27 RX ORDER — EPHEDRINE SULFATE 50 MG/ML
INJECTION, SOLUTION INTRAVENOUS
Status: DISCONTINUED | OUTPATIENT
Start: 2023-10-27 | End: 2023-10-27

## 2023-10-27 RX ORDER — FENTANYL CITRATE 50 UG/ML
INJECTION, SOLUTION INTRAMUSCULAR; INTRAVENOUS
Status: DISCONTINUED | OUTPATIENT
Start: 2023-10-27 | End: 2023-10-27

## 2023-10-27 RX ORDER — MEPERIDINE HYDROCHLORIDE 25 MG/ML
12.5 INJECTION INTRAMUSCULAR; INTRAVENOUS; SUBCUTANEOUS ONCE AS NEEDED
Status: DISCONTINUED | OUTPATIENT
Start: 2023-10-27 | End: 2023-10-27 | Stop reason: HOSPADM

## 2023-10-27 RX ORDER — LIDOCAINE HYDROCHLORIDE 20 MG/ML
INJECTION INTRAVENOUS
Status: DISCONTINUED | OUTPATIENT
Start: 2023-10-27 | End: 2023-10-27

## 2023-10-27 RX ORDER — LIDOCAINE HYDROCHLORIDE 10 MG/ML
0.5 INJECTION, SOLUTION EPIDURAL; INFILTRATION; INTRACAUDAL; PERINEURAL ONCE
Status: DISCONTINUED | OUTPATIENT
Start: 2023-10-27 | End: 2023-10-27 | Stop reason: HOSPADM

## 2023-10-27 RX ORDER — BUPIVACAINE HYDROCHLORIDE AND EPINEPHRINE 5; 5 MG/ML; UG/ML
INJECTION, SOLUTION EPIDURAL; INTRACAUDAL; PERINEURAL
Status: DISCONTINUED | OUTPATIENT
Start: 2023-10-27 | End: 2023-10-27 | Stop reason: HOSPADM

## 2023-10-27 RX ORDER — OXYCODONE HYDROCHLORIDE 5 MG/1
5 TABLET ORAL
Status: DISCONTINUED | OUTPATIENT
Start: 2023-10-27 | End: 2023-10-27 | Stop reason: HOSPADM

## 2023-10-27 RX ORDER — MUPIROCIN 20 MG/G
OINTMENT TOPICAL 2 TIMES DAILY
Status: DISCONTINUED | OUTPATIENT
Start: 2023-10-27 | End: 2023-10-27 | Stop reason: HOSPADM

## 2023-10-27 RX ADMIN — HYDROMORPHONE HYDROCHLORIDE 0.4 MG: 2 INJECTION INTRAMUSCULAR; INTRAVENOUS; SUBCUTANEOUS at 11:10

## 2023-10-27 RX ADMIN — CARBOXYMETHYLCELLULOSE SODIUM 2 DROP: 2.5 SOLUTION/ DROPS OPHTHALMIC at 10:10

## 2023-10-27 RX ADMIN — LIDOCAINE HYDROCHLORIDE 100 MG: 20 INJECTION, SOLUTION INTRAVENOUS at 09:10

## 2023-10-27 RX ADMIN — DEXAMETHASONE SODIUM PHOSPHATE 4 MG: 4 INJECTION, SOLUTION INTRAMUSCULAR; INTRAVENOUS at 10:10

## 2023-10-27 RX ADMIN — HYDROMORPHONE HYDROCHLORIDE 0.4 MG: 2 INJECTION INTRAMUSCULAR; INTRAVENOUS; SUBCUTANEOUS at 10:10

## 2023-10-27 RX ADMIN — CEFAZOLIN 2 G: 330 INJECTION, POWDER, FOR SOLUTION INTRAMUSCULAR; INTRAVENOUS at 10:10

## 2023-10-27 RX ADMIN — ACETAMINOPHEN 975 MG: 325 TABLET, FILM COATED ORAL at 08:10

## 2023-10-27 RX ADMIN — ONDANSETRON HYDROCHLORIDE 4 MG: 2 INJECTION INTRAMUSCULAR; INTRAVENOUS at 10:10

## 2023-10-27 RX ADMIN — EPHEDRINE SULFATE 10 MG: 50 INJECTION INTRAVENOUS at 10:10

## 2023-10-27 RX ADMIN — KETOROLAC TROMETHAMINE 30 MG: 30 INJECTION, SOLUTION INTRAMUSCULAR; INTRAVENOUS at 10:10

## 2023-10-27 RX ADMIN — SODIUM CHLORIDE, SODIUM LACTATE, POTASSIUM CHLORIDE, AND CALCIUM CHLORIDE: 600; 310; 30; 20 INJECTION, SOLUTION INTRAVENOUS at 09:10

## 2023-10-27 RX ADMIN — FENTANYL CITRATE 100 MCG: 50 INJECTION, SOLUTION INTRAMUSCULAR; INTRAVENOUS at 09:10

## 2023-10-27 RX ADMIN — OXYCODONE HYDROCHLORIDE 5 MG: 5 TABLET ORAL at 10:10

## 2023-10-27 RX ADMIN — MIDAZOLAM HYDROCHLORIDE 2 MG: 1 INJECTION, SOLUTION INTRAMUSCULAR; INTRAVENOUS at 09:10

## 2023-10-27 RX ADMIN — PROPOFOL 200 MG: 10 INJECTION, EMULSION INTRAVENOUS at 09:10

## 2023-10-27 RX ADMIN — FAMOTIDINE 20 MG: 20 TABLET ORAL at 08:10

## 2023-10-27 NOTE — DISCHARGE INSTRUCTIONS
Knee Athroscopy Discharge Instructions    Pain: After surgery your knee will be sore. The knee will likely have been injected with a numbing medicine (Exparel) prior to completion of surgery for pain control. This is indicated on a green bracelet that you will continue to wear for 4 days after surgery. You will   also get a prescription for pain control before you leave the hospital. Ice and elevation will assist with pain control.    Apply ice as much as possible for the first 72 hours. After 72 hours, apply ice for 20minutes after therapy,after exercising or whenever experiencing pain. Avoid direct skin contact with ice to prevent frostbit.          Elevate the affected leg with the pillow the length of the leg higher than your heart to assist with swelling and pain.  Incision Care:  Some drainage from the incision in the first 72 hours is normal. If drainage is excessive,remove bandage, clean with mild soap and water, pat dry, cover with sterile gauze and secure with tape. Notify physician about excessive drainage. Staples will be removed 14-21 days after surgery   Activity:  Perform exercises 2-3 x day.  You may shower 48 hours after surgery providing the dressing is waterproof. No tub or hot tub usage. Support help is mandatory during showering. If the dressing becomes wet, replace with a new dressing.   Wear thigh high juanita hose stockings for 3 weeks after surgery .You may remove stockings for 1- 2 hours during the day only.  If your physician orders the CPM machine you are to use it for 2 hours in the am and 2 hours in the pm.Increase the flexion 5 degrees each session if tolerated. This is not to replace your exercise program.  For lifetime after your replacement surgery, you may need antibiotic coverage before dental or minor surgical procedures.  Possible Complication:  Infection: Report these signs and symptoms to your surgeon.  Unexpected redness around incision   Persistent drainage from wound after 72  hours.  Temperature greater than 101 degrees F  Additional swelling  Pain not controlled with current pain medication  Blood Clot: Report theses signs and symptoms to your surgeon  Unusual pain  Red or discolored skin  Swelling in the leg  Unusual warm skin

## 2023-10-27 NOTE — ANESTHESIA PROCEDURE NOTES
Intubation    Date/Time: 10/27/2023 10:01 AM    Performed by: Varun Travis CRNA  Authorized by: Eulalio Coughlin MD    Intubation:     Induction:  Intravenous    Intubated:  Postinduction    Mask Ventilation:  Easy mask    Attempts:  1    Attempted By:  CRNA    Difficult Airway Encountered?: No      Complications:  None    Airway Device:  Supraglottic airway/LMA    Airway Device Size:  4.0    Style/Cuff Inflation:  Uncuffed    Secured at:  The lips    Placement Verified By:  Capnometry    Complicating Factors:  None    Findings Post-Intubation:  BS equal bilateral and atraumatic/condition of teeth unchanged

## 2023-10-27 NOTE — ANESTHESIA POSTPROCEDURE EVALUATION
Anesthesia Post Evaluation    Patient: Dasia Henning    Procedure(s) Performed: Procedure(s) (LRB):  ARTHROSCOPY, KNEE (Left)    Final Anesthesia Type: general      Patient location during evaluation: PACU  Patient participation: Yes- Able to Participate  Level of consciousness: awake and alert  Post-procedure vital signs: reviewed and stable  Pain management: adequate  Airway patency: patent  AMOL mitigation strategies: Extubation while patient is awake  PONV status at discharge: No PONV  Anesthetic complications: no      Cardiovascular status: hemodynamically stable  Respiratory status: unassisted  Hydration status: euvolemic  Follow-up not needed.          Vitals Value Taken Time   /65 10/27/23 1131   Temp 36.6 °C (97.8 °F) 10/27/23 1047   Pulse 87 10/27/23 1138   Resp 16 10/27/23 1130   SpO2 96 % 10/27/23 1138   Vitals shown include unvalidated device data.      Event Time   Out of Recovery 11:39:00         Pain/Jj Score: Pain Rating Prior to Med Admin: 7 (10/27/2023 11:17 AM)  Pain Rating Post Med Admin: 4 (10/27/2023 11:23 AM)  Jj Score: 10 (10/27/2023 11:17 AM)

## 2023-10-27 NOTE — BRIEF OP NOTE
[facility]   Operative Note     SUMMARY     Surgery Date: 10/27/2023     Surgeon(s) and Role:     * Williams, Claude S. IV, MD - Primary    Assisting Surgeon: None    Pre-op Diagnosis:  Acute medial meniscus tear of left knee, initial encounter [S83.242A]    Post-op Diagnosis:  Post-Op Diagnosis Codes:     * Acute medial meniscus tear of left knee, initial encounter [S83.242A]    Procedure(s) (LRB):  ARTHROSCOPY, KNEE (Left)    Anesthesia: General    Description of the findings of the procedure:  Left knee medial Meniscus tear  Left knee osteoarthritis anemia.  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  Chondrocalcinosis      Findings/Key Components:  As above    Procedure in detail:  After proper informed consent was obtained the patient was transported to the operative suite placed supine on the operating table.  Preoperative antibiotics were administered and the patient underwent general endotracheal anesthesia without difficulty.  The left lower extremity was sterilely prepped with alcohol ChloraPrep in a well-padded thigh tourniquet and arthroscopic leg medellin.  Examination of the left knee under anesthesia demonstrated trace effusion.  She lacks about 5° of extension and flexes 120°.  There is no pathologic instability with 1+ Lachman's, stable varus and valgus stress in extension and flexion.  A standard anterolateral portal was established and a comprehensive arthroscopy of the left knee was carried out.  Operative findings include grade 4 chondromalacia of the patella and trochlear groove completely eburnated.  The medial and lateral compartments had some grade 2 chondromalacia of the medial and lateral femoral condyle and tibial plateaus.  There was degenerative tearing of the lateral meniscus and tear of the medial meniscus posterior body.  There was  chondrocalcinosis diffusely.  A medial portal was established and the anterior and posterior cruciate ligaments were probed and found to have some laxity of the ACL with no acute tear and intact posterior cruciate ligament.  The arthroscopic shaver was used to debride the medial meniscus torn portion to a smooth stable base as well as to debride the lateral meniscus tear.  The arthroscopic shaver was used to perform a synovectomy as well removing the chondrocalcinosis deposits.  Thorough lavage.  No other loose bodies or pathology was noted.  After further lavage the knee was evacuated and the portals were closed with nylon interrupted suture and the portals were injected with 0.25% Marcaine with epinephrine approximately 10 cc.  A sterile soft dressing was applied.  Lap instrument and needle counts were correct.  Blood loss minimal.  Normal circulation after application of the dressing.  Lap instrument and needle counts were correct.  Blood loss minimal.  Complications none.    Estimated Blood Loss: * No values recorded between 10/27/2023 10:12 AM and 10/27/2023 10:47 AM *         Specimens:   Specimen (24h ago, onward)      None            Discharge Note    SUMMARY     Admit Date: 10/27/2023    Discharge Date and Time:  10/27/2023 10:51 AM    Hospital Course (synopsis of major diagnoses, care, treatment, and services provided during the course of the hospital stay):  Uneventful     Final Diagnosis: Post-Op Diagnosis Codes:     * Acute medial meniscus tear of left knee, initial encounter [S83.242A]    Disposition: Home or Self Care    Follow Up/Patient Instructions:     Medications:  Reconciled Home Medications:      Medication List        START taking these medications      HYDROcodone-acetaminophen 5-325 mg per tablet  Commonly known as: NORCO  Take 1 tablet by mouth every 4 (four) hours as needed for Pain.            CONTINUE taking these medications      albuterol 90 mcg/actuation inhaler  Commonly known as:  PROVENTIL/VENTOLIN HFA  Inhale 2 puffs into the lungs every 6 (six) hours as needed for Wheezing. Cough and Shortness of Breath. Rescue     ALPRAZolam 0.5 MG tablet  Commonly known as: XANAX  Take 1 tablet (0.5 mg total) by mouth nightly as needed for Anxiety.     cholecalciferol (vitamin D3) 25 mcg (1,000 unit) capsule  Commonly known as: VITAMIN D3  Take 1,000 Units by mouth once daily.     COQ-10 ORAL  Take by mouth.     * FLUoxetine 40 MG capsule     * FLUoxetine 20 MG capsule  Take 20 mg by mouth every morning.     GLUCOSAMINE-CHONDROITIN ORAL  Take by mouth.     IMVEXXY MAINTENANCE PACK 10 mcg Inst  Generic drug: estradioL  Place 1 suppository vaginally twice a week.     meloxicam 15 MG tablet  Commonly known as: MOBIC  Take 1 tablet (15 mg total) by mouth once daily.     methocarbamoL 750 MG Tab  Commonly known as: ROBAXIN  Take 1 tablet (750 mg total) by mouth 3 (three) times daily as needed (spasms).     RED YEAST RICE ORAL  Take by mouth.     traZODone 50 MG tablet  Commonly known as: DESYREL  Take 50 mg by mouth nightly.           * This list has 2 medication(s) that are the same as other medications prescribed for you. Read the directions carefully, and ask your doctor or other care provider to review them with you.                Discharge Procedure Orders   CRUTCHES FOR HOME USE     Order Specific Question Answer Comments   Type: Axillary    Height: 170    Weight: 85    Length of need (1-99 months): 1      Diet general     Activity as tolerated     Sponge bath only until clinic visit     Ice to affected area     Weight bearing restrictions (specify)     Leave dressing on - Keep it clean, dry, and intact until clinic visit     Call MD for:  temperature >100.4     Call MD for:  persistent nausea and vomiting     Call MD for:  severe uncontrolled pain     Call MD for:  difficulty breathing, headache or visual disturbances     Call MD for:  redness, tenderness, or signs of infection (pain, swelling,  redness, odor or green/yellow discharge around incision site)     Call MD for:  hives     Call MD for:  persistent dizziness or light-headedness     Call MD for:  extreme fatigue     Keep surgical extremity elevated     No driving, operating heavy equipment or signing legal documents while taking pain medication     SLR (straight leg raises), Quad Sets, ROM - active and passive   Standing Status: Future Standing Exp. Date: 10/27/24      Follow-up Information       Williams, Claude S. IV, MD Follow up on 10/30/2023.    Specialty: Orthopedic Surgery  Why: For wound re-check, For suture removal  Contact information:  [address]  [facility] [id number] [phone]

## 2023-10-27 NOTE — PLAN OF CARE
Dasia Henning has met all discharge criteria from Phase II. Vital Signs are stable, ambulating  without difficulty. Discharge instructions given, patient verbalized understanding. Discharged from facility via wheelchair in stable condition.

## 2023-10-27 NOTE — TRANSFER OF CARE
Anesthesia Transfer of Care Note    Patient: Dasia Henning    Procedure(s) Performed: Procedure(s) (LRB):  ARTHROSCOPY, KNEE (Left)    Patient location: PACU    Anesthesia Type: general    Transport from OR: Transported from OR on 2-3 L/min O2 by NC with adequate spontaneous ventilation    Post pain: adequate analgesia    Post assessment: no apparent anesthetic complications    Post vital signs: stable    Level of consciousness: awake    Nausea/Vomiting: no nausea/vomiting    Complications: none    Transfer of care protocol was followed      Last vitals:   Visit Vitals  BP (!) 169/74 (BP Location: Right arm, Patient Position: Lying)   Pulse 100   Temp 36.6 °C (97.8 °F) (Temporal)   Resp 16   SpO2 100%   Breastfeeding No

## 2024-01-12 ENCOUNTER — LAB VISIT (OUTPATIENT)
Dept: LAB | Facility: OTHER | Age: 69
End: 2024-01-12
Attending: INTERNAL MEDICINE
Payer: COMMERCIAL

## 2024-01-12 DIAGNOSIS — E78.5 HYPERLIPIDEMIA, UNSPECIFIED HYPERLIPIDEMIA TYPE: ICD-10-CM

## 2024-01-12 DIAGNOSIS — Z79.899 MEDICATION MANAGEMENT: ICD-10-CM

## 2024-01-12 DIAGNOSIS — N95.2 VAGINAL ATROPHY: ICD-10-CM

## 2024-01-12 DIAGNOSIS — F32.A ANXIETY AND DEPRESSION: ICD-10-CM

## 2024-01-12 DIAGNOSIS — F51.01 PRIMARY INSOMNIA: ICD-10-CM

## 2024-01-12 DIAGNOSIS — F41.9 ANXIETY AND DEPRESSION: ICD-10-CM

## 2024-01-12 LAB
25(OH)D3+25(OH)D2 SERPL-MCNC: 44 NG/ML (ref 30–96)
ALBUMIN SERPL BCP-MCNC: 3.9 G/DL (ref 3.5–5.2)
ALP SERPL-CCNC: 75 U/L (ref 55–135)
ALT SERPL W/O P-5'-P-CCNC: 20 U/L (ref 10–44)
ANION GAP SERPL CALC-SCNC: 10 MMOL/L (ref 8–16)
AST SERPL-CCNC: 19 U/L (ref 10–40)
BASOPHILS # BLD AUTO: 0.03 K/UL (ref 0–0.2)
BASOPHILS NFR BLD: 0.4 % (ref 0–1.9)
BILIRUB SERPL-MCNC: 0.6 MG/DL (ref 0.1–1)
BUN SERPL-MCNC: 15 MG/DL (ref 8–23)
CALCIUM SERPL-MCNC: 9.4 MG/DL (ref 8.7–10.5)
CHLORIDE SERPL-SCNC: 104 MMOL/L (ref 95–110)
CHOLEST SERPL-MCNC: 255 MG/DL (ref 120–199)
CHOLEST/HDLC SERPL: 5 {RATIO} (ref 2–5)
CO2 SERPL-SCNC: 23 MMOL/L (ref 23–29)
CREAT SERPL-MCNC: 0.8 MG/DL (ref 0.5–1.4)
DIFFERENTIAL METHOD BLD: NORMAL
EOSINOPHIL # BLD AUTO: 0.1 K/UL (ref 0–0.5)
EOSINOPHIL NFR BLD: 1.4 % (ref 0–8)
ERYTHROCYTE [DISTWIDTH] IN BLOOD BY AUTOMATED COUNT: 12.5 % (ref 11.5–14.5)
EST. GFR  (NO RACE VARIABLE): >60 ML/MIN/1.73 M^2
GLUCOSE SERPL-MCNC: 91 MG/DL (ref 70–110)
HCT VFR BLD AUTO: 40.1 % (ref 37–48.5)
HDLC SERPL-MCNC: 51 MG/DL (ref 40–75)
HDLC SERPL: 20 % (ref 20–50)
HGB BLD-MCNC: 13.2 G/DL (ref 12–16)
IMM GRANULOCYTES # BLD AUTO: 0.02 K/UL (ref 0–0.04)
IMM GRANULOCYTES NFR BLD AUTO: 0.3 % (ref 0–0.5)
LDLC SERPL CALC-MCNC: 178.2 MG/DL (ref 63–159)
LYMPHOCYTES # BLD AUTO: 2.2 K/UL (ref 1–4.8)
LYMPHOCYTES NFR BLD: 29.2 % (ref 18–48)
MCH RBC QN AUTO: 29.1 PG (ref 27–31)
MCHC RBC AUTO-ENTMCNC: 32.9 G/DL (ref 32–36)
MCV RBC AUTO: 89 FL (ref 82–98)
MONOCYTES # BLD AUTO: 0.6 K/UL (ref 0.3–1)
MONOCYTES NFR BLD: 8.6 % (ref 4–15)
NEUTROPHILS # BLD AUTO: 4.4 K/UL (ref 1.8–7.7)
NEUTROPHILS NFR BLD: 60.1 % (ref 38–73)
NONHDLC SERPL-MCNC: 204 MG/DL
NRBC BLD-RTO: 0 /100 WBC
PLATELET # BLD AUTO: 307 K/UL (ref 150–450)
PMV BLD AUTO: 9.6 FL (ref 9.2–12.9)
POTASSIUM SERPL-SCNC: 4.4 MMOL/L (ref 3.5–5.1)
PROT SERPL-MCNC: 7.2 G/DL (ref 6–8.4)
RBC # BLD AUTO: 4.53 M/UL (ref 4–5.4)
SODIUM SERPL-SCNC: 137 MMOL/L (ref 136–145)
TRIGL SERPL-MCNC: 129 MG/DL (ref 30–150)
VIT B12 SERPL-MCNC: 777 PG/ML (ref 210–950)
WBC # BLD AUTO: 7.36 K/UL (ref 3.9–12.7)

## 2024-01-12 PROCEDURE — 80053 COMPREHEN METABOLIC PANEL: CPT | Performed by: INTERNAL MEDICINE

## 2024-01-12 PROCEDURE — 80061 LIPID PANEL: CPT | Performed by: INTERNAL MEDICINE

## 2024-01-12 PROCEDURE — 82306 VITAMIN D 25 HYDROXY: CPT | Performed by: INTERNAL MEDICINE

## 2024-01-12 PROCEDURE — 82607 VITAMIN B-12: CPT | Performed by: INTERNAL MEDICINE

## 2024-01-12 PROCEDURE — 85025 COMPLETE CBC W/AUTO DIFF WBC: CPT | Performed by: INTERNAL MEDICINE

## 2024-01-12 PROCEDURE — 36415 COLL VENOUS BLD VENIPUNCTURE: CPT | Performed by: INTERNAL MEDICINE

## 2024-03-13 DIAGNOSIS — M25.562 LEFT KNEE PAIN: Primary | ICD-10-CM

## 2024-03-15 NOTE — PROGRESS NOTES
Health  Wellness Visit Note    Name: Dasia Henning  Clinic Number: [id number]  Physician: No ref. provider found  Diagnosis: No diagnosis found.  Past Medical History:   Diagnosis Date    Anxiety     Depression     Fracture of lower leg      Visit Number: 26  Precautions: L Sprained ankle/fracture      1st PT visit:  11/18/2022  Year of care end date:  11/18/2023  6 Month test  performed: N/A  12 Month test performed: December 2023  Mind body plan: Plan A-2 months  Patient level:NP    Time In: 8:32 AM   Time Out: 9:20 AM  Total Treatment Time: 48 minutes    Wellness [employer] 2022  Handout on this week's wellness topic Boundaries was provided along with a discussion on what it means, the benefits, and suggestions for practice.  Reviewed last week's topic of Self-Assessment     Subjective:   Patient reports that her low back feels sore today. She sees a  1x/week, goes for walks daily and stretches everyday.      Objective:   Dasia completed therapeutic stretches (EIL, MARCIO) and the following MedX exercise machines: core lumbar, leg extension, leg curl, upright row, chest press, biceps curl, triceps extension, leg press. Did not do torso rotation because of soreness in back.    See exercise log in patient folder for rate of exertion and repetitions completed.       Fitness Machine Education Key:  E=education on equipment initiated and further follow up and education needed  I=independent with  and exercise.  The patient:  Adjusts machines to his/her settings  Uses equipment levers, pins, weights safely  Maintains safe and correct posture while exercising  Moves through exercise with correct pace and control  Gets on and off equipment safely      Lumbar Ext.  Torso Rotation  Leg Press    Leg Extension E Seated Leg Curl E Chest Press E   Seated Row  Hip ADD X Hip ABD E   Triceps Extension E Bicep Curl E Other:        Assessment:   Patient tolerated Patient tolerated MedX Core Lumbar Strength  and all other peripheral exercises without an increase in symptoms. Patient warmed up on Recumbent Bike for 5 minutes, stretched, and skipped ice.     Plan:  Continue with established plan of care towards wellness goals.     Health  : Vasyl Skelton  10/26/2022

## 2024-03-17 NOTE — PROGRESS NOTES
[facility]   Physical Therapy Initial Evaluation      Name: Dasia Henning  Clinic Number: [id number]    Therapy Diagnosis:   Encounter Diagnoses   Name Primary?    Weakness of both hips Yes    Decreased range of motion of both knees         Physician: Self, Aaareferral    Physician Orders: PT Eval and Treat   Medical Diagnosis from Referral:   M25.562 (ICD-10-CM) - Left knee pain   Evaluation Date: 3/18/2024  Authorization Period Expiration: 12/31/24  Plan of Care Expiration: 6/18/24  Progress Note Due: 4/18/24  Visit # / Visits authorized: 1/ 1   FOTO: 0/5    Precautions: Standard     Surgery: Knee Arthroscopy Performed by Claude Williams 10/27/23  Days Post-Op: 20 Weeks and 3 Days (3/18/24)     Time In: 7:01 am   Time Out: 8:06 am   Total Billable Time: 65 minutes    Subjective     Date of onset:     History of current condition - Dasia reports: That received a menisectomy in October after dealing with increased pain in the knee and mechanical symptoms.  She had a previous meniscus surgery approximately 20 years ago as well as a traumatic event leading to significant fracturing of the ankle and foot that was externally fixated.  Originally after her meniscus she had slight improvement her pain and physical therapy appeared to be doing well, but she felt like she was hitting a plateau before regressing into increased pain and sensations of weakness.  The pain presents on the outside of the kneecap to the top of the patella and in the back of the knee in the popliteal fossa.  She has been having significant difficulty with the pain and feels that the leg is weak and gives her a lot of difficulty throughout each day.  This is limiting her exercise which is aggravating her and she feels she is gaining weight which she doesn't like.  She feels a lot of creaking and general crepitus with various movements.  Over the past 2 weeks she has begun to feel a numbness and tingling sensation radiating  down the shin, but it isn't constant and she can't pin point when it occurs.  She feels that her calves are tight and stretching them hurts in a good way.  She has tried a cortisone shot which provided great relief for 2 days and then provided no further improvements.  She really wants to build strength aggressively.  She saw her doctor last week and will provide faxed information.       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  Subchondral insufficiency fracture medial femoral condyle with extensive osseous edema.  No evidence for subchondral collapse.     Severe patellofemoral degenerative change as noted above.     Mucoid degeneration of the ACL and PCL    Prior Therapy: Yes - Post-Op for Knee Surgery and Healthy Back Program ([facility])   Social History: Lives in multi-story home with stairs   Occupation: Works at [employer]  Prior Level of Function: Independent - Playing tennis, exercising, walking, no limitations   Current Level of Function: Pain daily, unable to exercise or walk, stairs are painful     Pain:  Current 2/10, worst 7/10, best 1/10   Location: Left Lateral Knee Around the Patella and in the Popliteal Space   Description: Throbbing   Aggravating Factors: Constant, Walking, Changing Positions   Easing Factors: Ice, Tylenol    Patients goals: Get back to working [employer], improve muscle activation and strength, return to  walking and exercise.        Medical History:   Past Medical History:   Diagnosis Date    Anxiety     Depression     Diverticulitis     Fracture of lower leg     Sleep apnea     uses CPAP       Surgical History:   Dasia Henning  has a past surgical history that includes Appendectomy; Ovarian cyst removal; Ankle fracture surgery; knee ascope;  section; and Arthroscopy of knee (Left, 10/27/2023).    Medications:   Dasia has a current medication list which includes the following prescription(s): albuterol, alprazolam, cholecalciferol (vitamin d3), ciprofloxacin hcl, imvexxy maintenance pack, fluoxetine, fluoxetine, glucosamine hcl/chondroitin greer, hydrocodone-acetaminophen, meloxicam, methocarbamol, neomycin-polymyxin-hydrocortisone, rosuvastatin, trazodone, and ubidecarenone.    Allergies:   Review of patient's allergies indicates:   Allergen Reactions    Sulfa (sulfonamide antibiotics)      Itchy    Morphine Nausea And Vomiting     Tolerates all other pain meds        Objective      Postural Assessment: Patient stands with right hip hiked compared to left side with bilateral ankle pronation.    Observation: Patient shows significant hesitation with rising from a seated position with weight shifting off of the left side naturally.  With cueing she was able to balance her lower extremity pressures.  Patient has significant pain with movement from flexion to extension.  Gait reveals prominent hip hiking of right side and slightly limited push-off of left ankle.        Measurements:    Right ROM Right MMT Left ROM Left MMT Notes:   Knee Extension:  (0 Degrees)  -4 4+/5 -10 4/5    Knee Flexion:  (145 Degrees)  132 4-/5 115 4-/5    Ankle DF:  (20 Degrees)  12 4+/5 11 4/5    Ankle PF:  (50 Degrees)  52 5/5 56 4+/5    *Patient demonstrates approximately 65 degrees of external rotation and 15 degrees of internal rotation bilaterally       Additional Strength Testing:    Right MMT Left MMT   Hamstrings: 4-/5 4-/5   Hip  flexion (supine): 4-/5 4/5   Hip extension:  3/5 3/5   Glut Max  3-/5 3-/5   PGM: 2/5 2/5   Hip Abduction  4-/5 4/5   Hip ER:  4-/5 4/5   Hip IR: 3+/5 3+/5     Leg Length Measurements (ASIS to Medial Malleolus): Left = 88 cm and Right = 89 cm     Special Tests:   Tests Right LE:  Left LE:    Single Leg Balance  Severe pronation of ankle and unable to balance without 1 finger upper extremity support Severe pronation of ankle and unable to balance without 4 finger upper extremity support   Double Leg Squat  Dynamic knee valgus, ankle pronation, and poor depth with increased pain.  Quad dominant upright trunk posture   Valgus Stress 0/30 Degrees NT NT   Varus Stress 0/30 Degrees NT NT   Anterior Drawer Negative Negative   Posterior Drawer Negative Negative   Rod Negative Negative   Thessaly  NT NT   Patellar Apprehension Negative Negative    Patellar Grind NT NT   Patellar Tilt NT NT     Joint Mobility:   Tibiofemoral Joint: Limited posterior and anterior glide of the joint compared to right side - patient reported relief  Fibular Head Mobilization: Significantly limited and painful   Tibial Rotation: Equal internal and external rotation tested   Patellofemoral: Significantly limited with high levels of pain with inferior gliding   Infrapatellar Fat Pad: Very limited mobility    Adverse Neural Tension:   Negative SLR for all biases     Palpation:  Tenderness to lateral patella, posterior knee/popliteal fossa, fibular head, and superolateral area to the knee (femoral epicondyle)     Edema:  Girth Measurement Joint line Mid calf   Right NT cm NT cm   Left NT cm NT cm     Sensation: Patient reported intermittent numbness and tingling down the shin that has recently started approximately in the past 10 days, but it was not present or reproducible today.      Intake Outcome Measure for FOTO TBD Survey    Therapist reviewed FOTO scores for Dasia DE GUZMAN Milady on 3/18/2024.   FOTO documents entered into EPIC - see Media  section.    Intake Score: TBD%         Treatment     Total Treatment time (time-based codes) separate from Evaluation: 26 minutes     Dasia received the treatments listed below:      Therapeutic Exercises to develop strength, endurance, ROM, posture, and core stabilization for 0 minutes including:      Manual Therapy Techniques: The techniques below were applied for 14 minutes:    Patellar Mobilizations 4 Directions Grades II-III  Infrapatellar Fat Mobilizations Grade III  Tibiofemoral Mobilizations Posterior and Anterior Grades II-III  Tibiofemoral Extension Hinge Mobilization Grades I-II  Fibular Head Mobilizations Grade III    Neuromuscular Re-Education activities to improve:  for 0 minutes. The following activities were included:      Therapeutic Activities to improve functional performance for 12  minutes, including:  Patient Education (See Below)     hot pack for 0 minutes to .    cold pack for 0 minutes to .    Patient Education and Home Exercises     Education provided:   - HEP  - Plan of Care   - Explanation of Findings  - Relation of Proximal and Distal Joints to the Knee   - Anatomy and Physiology of the Knee Joint     Written Home Exercises Provided: yes. Exercises were reviewed and Dasia was able to demonstrate them prior to the end of the session.  Dasia demonstrated good  understanding of the education provided. See EMR under Patient Instructions for exercises provided during therapy sessions.    Assessment     Dasia is a 68 y.o. female referred to outpatient Physical Therapy with a medical diagnosis of   M25.562 (ICD-10-CM) - Left knee pain   . Patient presents with residual knee pain following a menisectomy in October, making her 20 weeks post-operative.  She initially showed good progress with improving strengthening and decreasing pain before regressing into increased pain and decreased functioning.  Increased loads with single leg balance and stair ascending/descending produce significant pain  and she attempts to avoid stairs when possible which is difficult due to stairs in home.  She presents with prominent limitation of extension and notable limitation of flexion that is highly provocative to switch between.  Manual overpressure to work towards extension is high irritating and patient did not tolerate well.  Lower extremity length was measured and found to have a 1 cm difference which could correlate to her healing from past traumatic fractures and does correlate to her history of low back pain on the side of the lengthened limb.  Joint mobility in the knee is limited, but mobilizations do provide a relieving sensation.  Infrapatellar fat pad and patellofemoral mobility deficits coupled with her extension provides prominent increased force through the anterior knee and could be irritating to the knee with decreased intrinsic support in the joint.  Patient had negative adverse neural tension testing but will continue to be assessed throughout treatment.  Patient has show good individualized testing strength, but has deficits with motor control particularly with extension as she is extremely hamstring dominant with almost no glut activation which correlates with her quad dominant squat form.  She will benefit from individualized strengthening following improvements in joint mobility and motor coordination.      Patient prognosis is Good.   Patient will benefit from skilled outpatient Physical Therapy to address the deficits stated above and in the chart below, provide patient /family education, and to maximize patientt's level of independence.     Plan of care discussed with patient: Yes  Patient's spiritual, cultural and educational needs considered and patient is agreeable to the plan of care and goals as stated below:     Anticipated Barriers for therapy: None at this time.     Medical Necessity is demonstrated by the following  History  Co-morbidities and personal factors that may impact the plan of  care [] LOW: no personal factors / co-morbidities  [x] MODERATE: 1-2 personal factors / co-morbidities  [] HIGH: 3+ personal factors / co-morbidities    Moderate / High Support Documentation:   Co-morbidities affecting plan of care: Anxiety/Depression    Personal Factors:        Examination  Body Structures and Functions, activity limitations and participation restrictions that may impact the plan of care [x] LOW: addressing 1-2 elements  [] MODERATE: 3+ elements  [] HIGH: 4+ elements (please support below)    Moderate / High Support Documentation:      Clinical Presentation [x] LOW: stable  [] MODERATE: Evolving  [] HIGH: Unstable     Decision Making/ Complexity Score: low       Goals:  Short Term Goals (4 Weeks):   1. Pt will be compliant with HEP to supplement PT in restoring pain free function.  2. Pt will squat form to improve posterior chain loading and glut activation and decrease anterior knee loading.   3. Pt will improve impaired LE MMTs by 1/2 grade  to improve strength for functional tasks  4. Pt improve impaired knee extension ROM by 5 deg to improve mobility for normal movement patterns.   Long Term Goals (8 Weeks):  1. Pt will improve FOTO score to </= TBD% limited to decrease perceived limitation with mobility  2. Pt will demonstrate improved single leg loading without pain limitation to facilitate return to exercise classes.   3. Pt will improve impaired LE MMTs by 1 grade to improve strength for functional tasks.  4. Pt improve impaired knee ROM to WNL in all planes to improve mobility for normal movement patterns.   5. Pt will report pain of </=1/10 with ADLs to improve QOL and tolerance to activity.   Plan     Plan of care Certification: 3/18/2024 to 6/18/24.    Outpatient Physical Therapy 3 times weekly for 12 weeks to include the following interventions: Cervical/Lumbar Traction, Electrical Stimulation  , Gait Training, Manual Therapy, Moist Heat/ Ice, Neuromuscular Re-ed, Patient Education,  Self Care, Therapeutic Activities, Therapeutic Exercise, and Ultrasound.     Mian Jade, PT

## 2024-03-18 ENCOUNTER — CLINICAL SUPPORT (OUTPATIENT)
Dept: REHABILITATION | Facility: HOSPITAL | Age: 69
End: 2024-03-18
Payer: COMMERCIAL

## 2024-03-18 DIAGNOSIS — R29.898 WEAKNESS OF BOTH HIPS: Primary | ICD-10-CM

## 2024-03-18 DIAGNOSIS — M25.661 DECREASED RANGE OF MOTION OF BOTH KNEES: ICD-10-CM

## 2024-03-18 DIAGNOSIS — M25.662 DECREASED RANGE OF MOTION OF BOTH KNEES: ICD-10-CM

## 2024-03-18 PROCEDURE — 97140 MANUAL THERAPY 1/> REGIONS: CPT

## 2024-03-18 PROCEDURE — 97161 PT EVAL LOW COMPLEX 20 MIN: CPT

## 2024-03-18 PROCEDURE — 97530 THERAPEUTIC ACTIVITIES: CPT

## 2024-03-19 PROBLEM — M25.661 DECREASED RANGE OF MOTION OF BOTH KNEES: Status: ACTIVE | Noted: 2024-03-19

## 2024-03-19 PROBLEM — R29.898 WEAKNESS OF BOTH HIPS: Status: ACTIVE | Noted: 2024-03-19

## 2024-03-19 PROBLEM — M25.662 DECREASED RANGE OF MOTION OF BOTH KNEES: Status: ACTIVE | Noted: 2024-03-19

## 2024-03-20 ENCOUNTER — CLINICAL SUPPORT (OUTPATIENT)
Dept: REHABILITATION | Facility: HOSPITAL | Age: 69
End: 2024-03-20
Payer: COMMERCIAL

## 2024-03-20 DIAGNOSIS — R29.898 WEAKNESS OF BOTH HIPS: Primary | ICD-10-CM

## 2024-03-20 DIAGNOSIS — M25.662 DECREASED RANGE OF MOTION OF BOTH KNEES: ICD-10-CM

## 2024-03-20 DIAGNOSIS — M25.661 DECREASED RANGE OF MOTION OF BOTH KNEES: ICD-10-CM

## 2024-03-20 PROCEDURE — 97112 NEUROMUSCULAR REEDUCATION: CPT

## 2024-03-20 PROCEDURE — 97140 MANUAL THERAPY 1/> REGIONS: CPT

## 2024-03-20 NOTE — PLAN OF CARE
[facility]   Physical Therapy Initial Evaluation      Name: Dasia Henning  Clinic Number: [id number]    Therapy Diagnosis:   Encounter Diagnoses   Name Primary?    Weakness of both hips Yes    Decreased range of motion of both knees         Physician: Self, Aaareferral    Physician Orders: PT Eval and Treat   Medical Diagnosis from Referral:   M25.562 (ICD-10-CM) - Left knee pain   Evaluation Date: 3/18/2024  Authorization Period Expiration: 12/31/24  Plan of Care Expiration: 6/18/24  Progress Note Due: 4/18/24  Visit # / Visits authorized: 1/ 1   FOTO: 0/5    Precautions: Standard     Surgery: Knee Arthroscopy Performed by Claude Williams 10/27/23  Days Post-Op: 20 Weeks and 3 Days (3/18/24)     Time In: 7:01 am   Time Out: 8:06 am   Total Billable Time: 65 minutes    Subjective     Date of onset:     History of current condition - Dasia reports: That received a menisectomy in October after dealing with increased pain in the knee and mechanical symptoms.  She had a previous meniscus surgery approximately 20 years ago as well as a traumatic event leading to significant fracturing of the ankle and foot that was externally fixated.  Originally after her meniscus she had slight improvement her pain and physical therapy appeared to be doing well, but she felt like she was hitting a plateau before regressing into increased pain and sensations of weakness.  The pain presents on the outside of the kneecap to the top of the patella and in the back of the knee in the popliteal fossa.  She has been having significant difficulty with the pain and feels that the leg is weak and gives her a lot of difficulty throughout each day.  This is limiting her exercise which is aggravating her and she feels she is gaining weight which she doesn't like.  She feels a lot of creaking and general crepitus with various movements.  Over the past 2 weeks she has begun to feel a numbness and tingling sensation radiating  down the shin, but it isn't constant and she can't pin point when it occurs.  She feels that her calves are tight and stretching them hurts in a good way.  She has tried a cortisone shot which provided great relief for 2 days and then provided no further improvements.  She really wants to build strength aggressively.  She saw her doctor last week and will provide faxed information.       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  Subchondral insufficiency fracture medial femoral condyle with extensive osseous edema.  No evidence for subchondral collapse.     Severe patellofemoral degenerative change as noted above.     Mucoid degeneration of the ACL and PCL    Prior Therapy: Yes - Post-Op for Knee Surgery and Healthy Back Program ([facility])   Social History: Lives in multi-story home with stairs   Occupation: Works at [employer]  Prior Level of Function: Independent - Playing tennis, exercising, walking, no limitations   Current Level of Function: Pain daily, unable to exercise or walk, stairs are painful     Pain:  Current 2/10, worst 7/10, best 1/10   Location: Left Lateral Knee Around the Patella and in the Popliteal Space   Description: Throbbing   Aggravating Factors: Constant, Walking, Changing Positions   Easing Factors: Ice, Tylenol    Patients goals: Get back to working [employer], improve muscle activation and strength, return to  walking and exercise.        Medical History:   Past Medical History:   Diagnosis Date    Anxiety     Depression     Diverticulitis     Fracture of lower leg     Sleep apnea     uses CPAP       Surgical History:   Dasia Henning  has a past surgical history that includes Appendectomy; Ovarian cyst removal; Ankle fracture surgery; knee ascope;  section; and Arthroscopy of knee (Left, 10/27/2023).    Medications:   Dasia has a current medication list which includes the following prescription(s): albuterol, alprazolam, cholecalciferol (vitamin d3), ciprofloxacin hcl, imvexxy maintenance pack, fluoxetine, fluoxetine, glucosamine hcl/chondroitin greer, hydrocodone-acetaminophen, meloxicam, methocarbamol, neomycin-polymyxin-hydrocortisone, rosuvastatin, trazodone, and ubidecarenone.    Allergies:   Review of patient's allergies indicates:   Allergen Reactions    Sulfa (sulfonamide antibiotics)      Itchy    Morphine Nausea And Vomiting     Tolerates all other pain meds        Objective      Postural Assessment: Patient stands with right hip hiked compared to left side with bilateral ankle pronation.    Observation: Patient shows significant hesitation with rising from a seated position with weight shifting off of the left side naturally.  With cueing she was able to balance her lower extremity pressures.  Patient has significant pain with movement from flexion to extension.  Gait reveals prominent hip hiking of right side and slightly limited push-off of left ankle.        Measurements:    Right ROM Right MMT Left ROM Left MMT Notes:   Knee Extension:  (0 Degrees)  -4 4+/5 -10 4/5    Knee Flexion:  (145 Degrees)  132 4-/5 115 4-/5    Ankle DF:  (20 Degrees)  12 4+/5 11 4/5    Ankle PF:  (50 Degrees)  52 5/5 56 4+/5    *Patient demonstrates approximately 65 degrees of external rotation and 15 degrees of internal rotation bilaterally       Additional Strength Testing:    Right MMT Left MMT   Hamstrings: 4-/5 4-/5   Hip  flexion (supine): 4-/5 4/5   Hip extension:  3/5 3/5   Glut Max  3-/5 3-/5   PGM: 2/5 2/5   Hip Abduction  4-/5 4/5   Hip ER:  4-/5 4/5   Hip IR: 3+/5 3+/5     Leg Length Measurements (ASIS to Medial Malleolus): Left = 88 cm and Right = 89 cm     Special Tests:   Tests Right LE:  Left LE:    Single Leg Balance  Severe pronation of ankle and unable to balance without 1 finger upper extremity support Severe pronation of ankle and unable to balance without 4 finger upper extremity support   Double Leg Squat  Dynamic knee valgus, ankle pronation, and poor depth with increased pain.  Quad dominant upright trunk posture   Valgus Stress 0/30 Degrees NT NT   Varus Stress 0/30 Degrees NT NT   Anterior Drawer Negative Negative   Posterior Drawer Negative Negative   Rod Negative Negative   [place]ssaly  NT NT   Patellar Apprehension Negative Negative    Patellar Grind NT NT   Patellar Tilt NT NT     Joint Mobility:   Tibiofemoral Joint: Limited posterior and anterior glide of the joint compared to right side - patient reported relief  Fibular Head Mobilization: Significantly limited and painful   Tibial Rotation: Equal internal and external rotation tested   Patellofemoral: Significantly limited with high levels of pain with inferior gliding   Infrapatellar Fat Pad: Very limited mobility    Adverse Neural Tension:   Negative SLR for all biases     Palpation:  Tenderness to lateral patella, posterior knee/popliteal fossa, fibular head, and superolateral area to the knee (femoral epicondyle)     Edema:  Girth Measurement Joint line Mid calf   Right NT cm NT cm   Left NT cm NT cm     Sensation: Patient reported intermittent numbness and tingling down the shin that has recently started approximately in the past 10 days, but it was not present or reproducible today.      Intake Outcome Measure for FOTO TBD Survey    Therapist reviewed FOTO scores for Dasia DE GUZMAN Milady on 3/18/2024.   FOTO documents entered into EPIC - see Media  section.    Intake Score: TBD%         Treatment     Total Treatment time (time-based codes) separate from Evaluation: 26 minutes     Dasia received the treatments listed below:      Therapeutic Exercises to develop strength, endurance, ROM, posture, and core stabilization for 0 minutes including:      Manual Therapy Techniques: The techniques below were applied for 14 minutes:    Patellar Mobilizations 4 Directions Grades II-III  Infrapatellar Fat Mobilizations Grade III  Tibiofemoral Mobilizations Posterior and Anterior Grades II-III  Tibiofemoral Extension Hinge Mobilization Grades I-II  Fibular Head Mobilizations Grade III    Neuromuscular Re-Education activities to improve:  for 0 minutes. The following activities were included:      Therapeutic Activities to improve functional performance for 12  minutes, including:  Patient Education (See Below)     hot pack for 0 minutes to .    cold pack for 0 minutes to .    Patient Education and Home Exercises     Education provided:   - HEP  - Plan of Care   - Explanation of Findings  - Relation of Proximal and Distal Joints to the Knee   - Anatomy and Physiology of the Knee Joint     Written Home Exercises Provided: yes. Exercises were reviewed and Dasia was able to demonstrate them prior to the end of the session.  Dasia demonstrated good  understanding of the education provided. See EMR under Patient Instructions for exercises provided during therapy sessions.    Assessment     Dasia is a 68 y.o. female referred to outpatient Physical Therapy with a medical diagnosis of   M25.562 (ICD-10-CM) - Left knee pain   . Patient presents with residual knee pain following a menisectomy in October, making her 20 weeks post-operative.  She initially showed good progress with improving strengthening and decreasing pain before regressing into increased pain and decreased functioning.  Increased loads with single leg balance and stair ascending/descending produce significant pain  and she attempts to avoid stairs when possible which is difficult due to stairs in home.  She presents with prominent limitation of extension and notable limitation of flexion that is highly provocative to switch between.  Manual overpressure to work towards extension is high irritating and patient did not tolerate well.  Lower extremity length was measured and found to have a 1 cm difference which could correlate to her healing from past traumatic fractures and does correlate to her history of low back pain on the side of the lengthened limb.  Joint mobility in the knee is limited, but mobilizations do provide a relieving sensation.  Infrapatellar fat pad and patellofemoral mobility deficits coupled with her extension provides prominent increased force through the anterior knee and could be irritating to the knee with decreased intrinsic support in the joint.  Patient had negative adverse neural tension testing but will continue to be assessed throughout treatment.  Patient has show good individualized testing strength, but has deficits with motor control particularly with extension as she is extremely hamstring dominant with almost no glut activation which correlates with her quad dominant squat form.  She will benefit from individualized strengthening following improvements in joint mobility and motor coordination.      Patient prognosis is Good.   Patient will benefit from skilled outpatient Physical Therapy to address the deficits stated above and in the chart below, provide patient /family education, and to maximize patientt's level of independence.     Plan of care discussed with patient: Yes  Patient's spiritual, cultural and educational needs considered and patient is agreeable to the plan of care and goals as stated below:     Anticipated Barriers for therapy: None at this time.     Medical Necessity is demonstrated by the following  History  Co-morbidities and personal factors that may impact the plan of  care [] LOW: no personal factors / co-morbidities  [x] MODERATE: 1-2 personal factors / co-morbidities  [] HIGH: 3+ personal factors / co-morbidities    Moderate / High Support Documentation:   Co-morbidities affecting plan of care: Anxiety/Depression    Personal Factors:        Examination  Body Structures and Functions, activity limitations and participation restrictions that may impact the plan of care [x] LOW: addressing 1-2 elements  [] MODERATE: 3+ elements  [] HIGH: 4+ elements (please support below)    Moderate / High Support Documentation:      Clinical Presentation [x] LOW: stable  [] MODERATE: Evolving  [] HIGH: Unstable     Decision Making/ Complexity Score: low       Goals:  Short Term Goals (4 Weeks):   1. Pt will be compliant with HEP to supplement PT in restoring pain free function.  2. Pt will squat form to improve posterior chain loading and glut activation and decrease anterior knee loading.   3. Pt will improve impaired LE MMTs by 1/2 grade  to improve strength for functional tasks  4. Pt improve impaired knee extension ROM by 5 deg to improve mobility for normal movement patterns.   Long Term Goals (8 Weeks):  1. Pt will improve FOTO score to </= TBD% limited to decrease perceived limitation with mobility  2. Pt will demonstrate improved single leg loading without pain limitation to facilitate return to exercise classes.   3. Pt will improve impaired LE MMTs by 1 grade to improve strength for functional tasks.  4. Pt improve impaired knee ROM to WNL in all planes to improve mobility for normal movement patterns.   5. Pt will report pain of </=1/10 with ADLs to improve QOL and tolerance to activity.   Plan     Plan of care Certification: 3/18/2024 to 6/18/24.    Outpatient Physical Therapy 3 times weekly for 12 weeks to include the following interventions: Cervical/Lumbar Traction, Electrical Stimulation , Gait Training, Manual Therapy, Moist Heat/ Ice, Neuromuscular Re-ed, Patient Education,  Self Care, Therapeutic Activities, Therapeutic Exercise, and Ultrasound.     Mian Jade, PT

## 2024-03-20 NOTE — PROGRESS NOTES
[facility]   Physical Therapy Treatment Note      Name: Dasia Henning  Clinic Number: [id number]    Therapy Diagnosis:   Encounter Diagnoses   Name Primary?    Weakness of both hips Yes    Decreased range of motion of both knees      Physician: Williams, Claude S. IV,*    Visit Date: 3/20/2024  Physician Orders: PT Eval and Treat   Medical Diagnosis from Referral:   M25.562 (ICD-10-CM) - Left knee pain   Evaluation Date: 3/18/2024  Authorization Period Expiration: 12/31/24  Plan of Care Expiration: 6/18/24  Progress Note Due: 4/18/24  Visit # / Visits authorized: 1/ 1 1/20  FOTO: 1/5     Precautions: Standard      Surgery: Knee Arthroscopy Performed by Claude Williams 10/27/23  Days Post-Op: 20 Weeks and 5 Days (3/20/24)    PTA Visit #: 0/5     Time In: 7:01 am   Time Out: 8:08 am  Total Billable Time: 67 Minutes (One on One 38)     Subjective     Pt reports: That her knee is still pretty sore and she has been feeling a throbbing clicking on the top outside of her kneecap and she isn't sure what it is.     She was compliant with home exercise program.  Response to previous treatment: Initial aching   Functional change: No change     Pain: 4/10  Location:  Left Lateral Knee Around Patella and Popliteal Space      Objective      Objective Measures updated at progress report unless specified.     Treatment     Dasia received the treatments listed below:      therapeutic exercises to develop strength, endurance, ROM, posture, and core stabilization for 0 minutes including:      manual therapy techniques: The techniques below were applied for 23 minutes:     Patellar Mobilizations 4 Ways Grade II-III  Infrapatellar Fat Pad Mobilizations   Tibiofemoral Hinge Mobilizations for Extension Grade II-III  Posterior Tibial Mobilizations Grade II-III  Anterior Tibial Mobilizations Grade II-III  Medial and Lateral Gapping of the Knee Grade III  Anterior Hip Mobilizations Grade III-IV       neuromuscular  re-education activities to improve:  for 39 minutes. The following activities were included:    Short Arc Quads Over 1/2 Foam Roller 3 x 15 3 Sec Hold   Glut Sets 2 x 15 3 Sec Holds   Bridges GTB Around Knees 3 x 8 3 Sec Holds   Side Lying Clamshells GTB 2 x 12 2 Sec Holds   Bent Over Hip Extension Cueing for Glut Activation 2 x 12   Terminal Knee Extensions GTB 2 x 12 3 Sec Holds       therapeutic activities to improve functional performance for 5 minutes, including:    Patient Education    - Addition of Glut and Hip       hot pack for 0 minutes to .    cold pack for 0 minutes to .    Patient Education and Home Exercises       Education provided:   - HEP  - Plan of Care   - Relation of Proximal and Distal Joints to the Knee   - Anatomy and Physiology of the Knee Joint   - Patellar Tracking and Related Tissues     Written Home Exercises Provided: yes. Exercises were reviewed and Dasia was able to demonstrate them prior to the end of the session.  Dasia demonstrated good  understanding of the education provided. See EMR under Patient Instructions for exercises provided during therapy sessions    Assessment     Dasia presents to physical therapy with continued anterior knee pain surrounding the patella.  The combined decreased mobility of the patellofemoral joint, infrapatellar fat pad, knee extension deficits provides excessive loading through the anterior knee and is consistent with the development of patellofemoral pain syndrome that could be loading the joint creating resulting irritation and swelling further limiting quadriceps firing and knee mobility.  She had decreased hip mobility particularly with extension which compounded with weak gluts and quad dominant movement patterns that further leads to excessive loading of the anterior knee and joint surfaces that have become irritated.  Following glut and hip activation patient felt significant relief of pain that was short lasting, but significant.  She  previously had not addressed any glut or hip work which are her primary deficits present.  Manual intervention provided some improvement in joint mobility and extension.  She will continue to improve glut activation and working to improve optimal quad function to offload the anterior knee and improve mobility.      Dasia Is progressing well towards her goals.   Pt prognosis is Good.     Pt will continue to benefit from skilled outpatient physical therapy to address the deficits listed in the problem list box on initial evaluation, provide pt/family education and to maximize pt's level of independence in the home and community environment.     Pt's spiritual, cultural and educational needs considered and pt agreeable to plan of care and goals.     Anticipated barriers to physical therapy: None at this time     Goals:   Short Term Goals (4 Weeks):   1. Pt will be compliant with HEP to supplement PT in restoring pain free function.  2. Pt will squat form to improve posterior chain loading and glut activation and decrease anterior knee loading.   3. Pt will improve impaired LE MMTs by 1/2 grade  to improve strength for functional tasks  4. Pt improve impaired knee extension ROM by 5 deg to improve mobility for normal movement patterns.   Long Term Goals (8 Weeks):  1. Pt will improve FOTO score to </= TBD% limited to decrease perceived limitation with mobility  2. Pt will demonstrate improved single leg loading without pain limitation to facilitate return to exercise classes.   3. Pt will improve impaired LE MMTs by 1 grade to improve strength for functional tasks.  4. Pt improve impaired knee ROM to WNL in all planes to improve mobility for normal movement patterns.   5. Pt will report pain of </=1/10 with ADLs to improve QOL and tolerance to activity.   Plan      Plan of care Certification: 3/18/2024 to 6/18/24.     Outpatient Physical Therapy 3 times weekly for 12 weeks to include the following interventions:  Cervical/Lumbar Traction, Electrical Stimulation , Gait Training, Manual Therapy, Moist Heat/ Ice, Neuromuscular Re-ed, Patient Education, Self Care, Therapeutic Activities, Therapeutic Exercise, and Ultrasound.     Mian Jade, PT

## 2024-03-25 ENCOUNTER — CLINICAL SUPPORT (OUTPATIENT)
Dept: REHABILITATION | Facility: HOSPITAL | Age: 69
End: 2024-03-25
Payer: COMMERCIAL

## 2024-03-25 DIAGNOSIS — M25.661 DECREASED RANGE OF MOTION OF BOTH KNEES: ICD-10-CM

## 2024-03-25 DIAGNOSIS — R29.898 WEAKNESS OF BOTH HIPS: Primary | ICD-10-CM

## 2024-03-25 DIAGNOSIS — M25.662 DECREASED RANGE OF MOTION OF BOTH KNEES: ICD-10-CM

## 2024-03-25 PROCEDURE — 97140 MANUAL THERAPY 1/> REGIONS: CPT | Performed by: PHYSICAL THERAPIST

## 2024-03-25 PROCEDURE — 97110 THERAPEUTIC EXERCISES: CPT | Performed by: PHYSICAL THERAPIST

## 2024-03-25 PROCEDURE — 97112 NEUROMUSCULAR REEDUCATION: CPT | Performed by: PHYSICAL THERAPIST

## 2024-03-25 NOTE — PROGRESS NOTES
[facility]   Physical Therapy Treatment Note      Name: Dasia Henning  Clinic Number: [id number]    Therapy Diagnosis:   Encounter Diagnoses   Name Primary?    Weakness of both hips Yes    Decreased range of motion of both knees      Physician: Williams, Claude S. IV,*    Visit Date: 3/25/2024  Physician Orders: PT Eval and Treat   Medical Diagnosis from Referral:   M25.562 (ICD-10-CM) - Left knee pain   Evaluation Date: 3/18/2024  Authorization Period Expiration: 12/31/24  Plan of Care Expiration: 6/18/24  Progress Note Due: 4/18/24  Visit # / Visits authorized: 1/ 1 1/20  FOTO: 1/5     Precautions: Standard      Surgery: Knee Arthroscopy Performed by Claude Williams 10/27/23  Days Post-Op: 20 Weeks and 5 Days (3/20/24)    PTA Visit #: 0/5     Time In: 0658  am   Time Out: 8:03 am  Total Billable Time: 50 minutes      Subjective     Pt reports: Sore quads from working with exercises which she is encouraged about.    She was compliant with home exercise program.  Response to previous treatment: Initial aching   Functional change: No change     Pain: 4/10  Location:  Left Lateral Knee Around Patella and Popliteal Space      Objective      Objective Measures updated at progress report unless specified.     Treatment     Dasia received the treatments listed below:      therapeutic exercises to develop strength, endurance, ROM, posture, and core stabilization for 10 minutes including:  MH with passive knee extension 10 minutes    manual therapy techniques: The techniques below were applied for 15 minutes:     Patellar Mobilizations 4 Ways Grade II-III  Infrapatellar Fat Pad Mobilizations   Tibiofemoral Hinge Mobilizations for Extension Grade II-III  Posterior Tibial Mobilizations Grade II-III  Anterior Tibial Mobilizations Grade II-III  Medial and Lateral Gapping of the Knee Grade III  Tib/fib mobilizations P/A IV      neuromuscular re-education activities to improve:  for 25 minutes. The  "following activities were included:    Quad set with towel roll 10 x 10" (cues for   Bridges GTB Around Knees 2 x 10 5 Sec Holds   Side Lying Clamshells GTB 2 x 10 5 Sec Holds       therapeutic activities to improve functional performance for 5 minutes, including:    Patient Education    - Addition of Glut and Hip       hot pack for 0 minutes to .    cold pack for 0 minutes to .    Patient Education and Home Exercises       Education provided:   - HEP  - Plan of Care   - Relation of Proximal and Distal Joints to the Knee   - Anatomy and Physiology of the Knee Joint   - Patellar Tracking and Related Tissues     Written Home Exercises Provided: yes. Exercises were reviewed and Dasia was able to demonstrate them prior to the end of the session.  Dasia demonstrated good  understanding of the education provided. See EMR under Patient Instructions for exercises provided during therapy sessions    Assessment   Primary concern is for limited knee extension. Unable to diagnose cause of parestheisas in the lower extremity but possibly due to adaptive shortening of the sciatic N and its branches. Symptoms decrease with hip ER. Did well with low load long duration stretching and MH to improve knee extension.     Dasia Is progressing well towards her goals.   Pt prognosis is Good.     Pt will continue to benefit from skilled outpatient physical therapy to address the deficits listed in the problem list box on initial evaluation, provide pt/family education and to maximize pt's level of independence in the home and community environment.     Pt's spiritual, cultural and educational needs considered and pt agreeable to plan of care and goals.     Anticipated barriers to physical therapy: None at this time     Goals:   Short Term Goals (4 Weeks):   1. Pt will be compliant with HEP to supplement PT in restoring pain free function.  2. Pt will squat form to improve posterior chain loading and glut activation and decrease anterior " knee loading.   3. Pt will improve impaired LE MMTs by 1/2 grade  to improve strength for functional tasks  4. Pt improve impaired knee extension ROM by 5 deg to improve mobility for normal movement patterns.   Long Term Goals (8 Weeks):  1. Pt will improve FOTO score to </= TBD% limited to decrease perceived limitation with mobility  2. Pt will demonstrate improved single leg loading without pain limitation to facilitate return to exercise classes.   3. Pt will improve impaired LE MMTs by 1 grade to improve strength for functional tasks.  4. Pt improve impaired knee ROM to WNL in all planes to improve mobility for normal movement patterns.   5. Pt will report pain of </=1/10 with ADLs to improve QOL and tolerance to activity.   Plan      Plan of care Certification: 3/18/2024 to 6/18/24.     Outpatient Physical Therapy 3 times weekly for 12 weeks to include the following interventions: Cervical/Lumbar Traction, Electrical Stimulation , Gait Training, Manual Therapy, Moist Heat/ Ice, Neuromuscular Re-ed, Patient Education, Self Care, Therapeutic Activities, Therapeutic Exercise, and Ultrasound.     Tim Lynn, PT

## 2024-03-26 NOTE — PROGRESS NOTES
[facility]   Physical Therapy Treatment Note      Name: Dasia Henning  Clinic Number: [id number]    Therapy Diagnosis:   Encounter Diagnoses   Name Primary?    Weakness of both hips Yes    Decreased range of motion of both knees        Physician: Williams, Claude S. IV,*    Visit Date: 3/27/2024  Physician Orders: PT Eval and Treat   Medical Diagnosis from Referral:   M25.562 (ICD-10-CM) - Left knee pain   Evaluation Date: 3/18/2024  Authorization Period Expiration: 12/31/24  Plan of Care Expiration: 6/18/24  Progress Note Due: 4/18/24  Visit # / Visits authorized: 1/ 1 3/20  FOTO: 3/5     Precautions: Standard      Surgery: Knee Arthroscopy Performed by Claude Williams 10/27/23  Days Post-Op: 21 Weeks and 5 Days (3/27/24)    PTA Visit #: 0/5     Time In: 7:00  am   Time Out: 8:04 am  Total Billable Time: 64 minutes      Subjective     Pt reports: That she has felt better and had a good soreness in the muscle that made her feel like she did something right.  She feels like she is on the right path and no longer just letting the pain win.      She was compliant with home exercise program.  Response to previous treatment: Initial aching   Functional change: No change     Pain: 4/10  Location:  Left Lateral Knee Around Patella and Popliteal Space      Objective      Objective Measures updated at progress report unless specified.     Treatment     Dasia received the treatments listed below:      therapeutic exercises to develop strength, endurance, ROM, posture, and core stabilization for 18 minutes including:    MH with passive knee extension 18 minutes increasing heel prop     manual therapy techniques: The techniques below were applied for 11 minutes:     Patellar Mobilizations 4 Ways Grade II-III  Infrapatellar Fat Pad Mobilizations   Tibiofemoral Hinge Mobilizations for Extension Grade II-III    Not Today:   Posterior Tibial Mobilizations Grade II-III  Anterior Tibial Mobilizations Grade  II-III  Medial and Lateral Gapping of the Knee Grade III  Tib/fib mobilizations P/A IV      neuromuscular re-education activities to improve:  for 25 minutes. The following activities were included:    Quad set with Straight Leg Raise 5 Sec Hold 2 x 12   Short Arc Quad over Foam Roll 2 x 15 2 Sec Hold   Bridges GTB Around Knees 2 x 10 5 Sec Holds   Side Lying Clamshells GTB 2 x 10 5 Sec Holds   Prone Knee Extended Hip Extension 2 x 12 2 Sec Hold   Standing Heel Raises 2 x 12 3 Sec Holds       therapeutic activities to improve functional performance for 10 minutes, including:    Patient Education    - Addition of Glut and Hip       hot pack for 0 minutes to .    cold pack for 0 minutes to .    Patient Education and Home Exercises       Education provided:   - HEP  - Plan of Care   - Relation of Proximal and Distal Joints to the Knee   - Anatomy and Physiology of the Knee Joint   - Patellar Tracking and Related Tissues     Written Home Exercises Provided: yes. Exercises were reviewed and Dasia was able to demonstrate them prior to the end of the session.  Dasia demonstrated good  understanding of the education provided. See EMR under Patient Instructions for exercises provided during therapy sessions    Assessment     Dasia presented to physical therapy with positive reports and motivation following previous session feeling good soreness in the muscle without increased levels of pain in the knee.  She continues to have higher levels of irritability and fear which lead to gradual extension of the knee with moist hot packs to facilitate decreased guarding, capsule stretching, and muscle relaxation.  She showed marked improvement in extension following low load long duration.  She continued to work to improve quadriceps activation in her maximum extension and showed better tolerance today.  Prone hip extension was completed today to further activate glut musculature, but was placed in a position to allow for gravity to  assist knee into full extension.  She continued to work to improve full knee extension to improve joint congruency and improve biomechanics of the knee while decrease the chronic load/stress from the constantly flexed knee.      Dasia Is progressing well towards her goals.   Pt prognosis is Good.     Pt will continue to benefit from skilled outpatient physical therapy to address the deficits listed in the problem list box on initial evaluation, provide pt/family education and to maximize pt's level of independence in the home and community environment.     Pt's spiritual, cultural and educational needs considered and pt agreeable to plan of care and goals.     Anticipated barriers to physical therapy: None at this time     Goals:   Short Term Goals (4 Weeks):   1. Pt will be compliant with HEP to supplement PT in restoring pain free function.  2. Pt will squat form to improve posterior chain loading and glut activation and decrease anterior knee loading.   3. Pt will improve impaired LE MMTs by 1/2 grade  to improve strength for functional tasks  4. Pt improve impaired knee extension ROM by 5 deg to improve mobility for normal movement patterns.   Long Term Goals (8 Weeks):  1. Pt will improve FOTO score to </= TBD% limited to decrease perceived limitation with mobility  2. Pt will demonstrate improved single leg loading without pain limitation to facilitate return to exercise classes.   3. Pt will improve impaired LE MMTs by 1 grade to improve strength for functional tasks.  4. Pt improve impaired knee ROM to WNL in all planes to improve mobility for normal movement patterns.   5. Pt will report pain of </=1/10 with ADLs to improve QOL and tolerance to activity.   Plan      Plan of care Certification: 3/18/2024 to 6/18/24.     Outpatient Physical Therapy 3 times weekly for 12 weeks to include the following interventions: Cervical/Lumbar Traction, Electrical Stimulation , Gait Training, Manual Therapy, Moist Heat/  Ice, Neuromuscular Re-ed, Patient Education, Self Care, Therapeutic Activities, Therapeutic Exercise, and Ultrasound.     Mian Jade, PT

## 2024-03-27 ENCOUNTER — CLINICAL SUPPORT (OUTPATIENT)
Dept: REHABILITATION | Facility: HOSPITAL | Age: 69
End: 2024-03-27
Payer: COMMERCIAL

## 2024-03-27 DIAGNOSIS — R29.898 WEAKNESS OF BOTH HIPS: Primary | ICD-10-CM

## 2024-03-27 DIAGNOSIS — M25.662 DECREASED RANGE OF MOTION OF BOTH KNEES: ICD-10-CM

## 2024-03-27 DIAGNOSIS — M25.661 DECREASED RANGE OF MOTION OF BOTH KNEES: ICD-10-CM

## 2024-03-27 PROCEDURE — 97530 THERAPEUTIC ACTIVITIES: CPT

## 2024-03-27 PROCEDURE — 97112 NEUROMUSCULAR REEDUCATION: CPT

## 2024-03-27 PROCEDURE — 97110 THERAPEUTIC EXERCISES: CPT

## 2024-03-27 PROCEDURE — 97140 MANUAL THERAPY 1/> REGIONS: CPT

## 2024-04-03 ENCOUNTER — CLINICAL SUPPORT (OUTPATIENT)
Dept: REHABILITATION | Facility: HOSPITAL | Age: 69
End: 2024-04-03
Payer: COMMERCIAL

## 2024-04-03 DIAGNOSIS — R29.898 WEAKNESS OF BOTH HIPS: Primary | ICD-10-CM

## 2024-04-03 DIAGNOSIS — M25.661 DECREASED RANGE OF MOTION OF BOTH KNEES: ICD-10-CM

## 2024-04-03 DIAGNOSIS — M25.662 DECREASED RANGE OF MOTION OF BOTH KNEES: ICD-10-CM

## 2024-04-03 PROCEDURE — 97140 MANUAL THERAPY 1/> REGIONS: CPT

## 2024-04-03 PROCEDURE — 97110 THERAPEUTIC EXERCISES: CPT

## 2024-04-03 PROCEDURE — 97112 NEUROMUSCULAR REEDUCATION: CPT

## 2024-04-03 NOTE — PROGRESS NOTES
[facility]   Physical Therapy Treatment Note      Name: Dasia Henning  Clinic Number: [id number]    Therapy Diagnosis:   Encounter Diagnoses   Name Primary?    Weakness of both hips Yes    Decreased range of motion of both knees        Physician: Williams, Claude S. IV,*    Visit Date: 4/3/2024  Physician Orders: PT Eval and Treat   Medical Diagnosis from Referral:   M25.562 (ICD-10-CM) - Left knee pain   Evaluation Date: 3/18/2024  Authorization Period Expiration: 12/31/24  Plan of Care Expiration: 6/18/24  Progress Note Due: 4/18/24  Visit # / Visits authorized: 1/ 1 4/20  FOTO: 4/5     Precautions: Standard      Surgery: Knee Arthroscopy Performed by Claude Williams 10/27/23  Days Post-Op: 21 Weeks and 5 Days (3/27/24)    PTA Visit #: 0/5     Time In: 7:04 am   Time Out: 8:03 am  Total Billable Time: 59 minutes      Subjective     Pt reports: That she felt pretty good leaving last session and then completed her HEP the next day which created an intense soreness in the knee and she had to rest for a day and then the knee felt much better.  She was able to do an exercise class on Saturday and felt great with muscle soreness on Sunday and today she is feeling pretty good.      She was compliant with home exercise program.  Response to previous treatment: Initial aching   Functional change: No change     Pain: 4/10  Location:  Left Lateral Knee Around Patella and Popliteal Space      Objective      Objective Measures updated at progress report unless specified.     Treatment     Dasia received the treatments listed below:      therapeutic exercises to develop strength, endurance, ROM, posture, and core stabilization for 16 minutes including:    MH with passive knee extension 16 minutes increasing heel prop     manual therapy techniques: The techniques below were applied for 17 minutes:     Patellar Mobilizations 4 Ways Grade II-III  Infrapatellar Fat Pad Mobilizations   Tibiofemoral Hinge  Mobilizations for Extension Grade II-III  Tib/Fib Mobilizations P/A IV    Not Today:   Posterior Tibial Mobilizations Grade II-III  Anterior Tibial Mobilizations Grade II-III  Medial and Lateral Gapping of the Knee Grade III      neuromuscular re-education activities to improve:  for 26 minutes. The following activities were included:    Quad set with Straight Leg Raise 5 Sec Hold 2 x 12  Short Arc Quad over Foam Roll 2 x 15 2 Sec Hold   Bridges GTB Around Knees 2 x 10 5 Sec Holds   Side Lying Clamshells GTB 2 x 10 5 Sec Holds   Prone Knee Extended Hip Extension 2 x 12 2 Sec Hold     Not Today:   Standing Heel Raises 2 x 12 3 Sec Holds       therapeutic activities to improve functional performance for 0 minutes, including:    Patient Education    - Addition of Glut and Hip       hot pack for 0 minutes to .    cold pack for 0 minutes to .    Patient Education and Home Exercises       Education provided:   - HEP  - Plan of Care   - Relation of Proximal and Distal Joints to the Knee   - Anatomy and Physiology of the Knee Joint   - Patellar Tracking and Related Tissues     Written Home Exercises Provided: yes. Exercises were reviewed and Dasia was able to demonstrate them prior to the end of the session.  Dasia demonstrated good  understanding of the education provided. See EMR under Patient Instructions for exercises provided during therapy sessions    Assessment     Dasia presented to physical therapy with improvement in knee extension and decreased pain levels.  She continues to feel a tightness and throbbing in the knee with further propping into knee extension, but she is tolerating gradual extension with moist heat well with better in session improvements and showing good signs of carry over from previous session.  Patellofemoral and infrapatellar fat pad mobility remains limited but has decreased irritability today and allowed for improved tolerance of increased mobilizations grades which improved  superior/inferior joint mobilizations.  Medial/lateral mobility remains more irritable. She continues to work on improve quad control into her full range of terminal knee extension with continued focus on improve proximal hip strengthening to provide good stability and off loading of the knee joint.  She has medial joint line tenderness that resolves with strumming over the joint line and supporting of the joint with quad sets.  She will continue to work to improve full knee extension to improve joint congruency and improve biomechanics of the knee while decrease the chronic load/stress from the constantly flexed knee.      Dasia Is progressing well towards her goals.   Pt prognosis is Good.     Pt will continue to benefit from skilled outpatient physical therapy to address the deficits listed in the problem list box on initial evaluation, provide pt/family education and to maximize pt's level of independence in the home and community environment.     Pt's spiritual, cultural and educational needs considered and pt agreeable to plan of care and goals.     Anticipated barriers to physical therapy: None at this time     Goals:   Short Term Goals (4 Weeks):   1. Pt will be compliant with HEP to supplement PT in restoring pain free function.  2. Pt will squat form to improve posterior chain loading and glut activation and decrease anterior knee loading.   3. Pt will improve impaired LE MMTs by 1/2 grade  to improve strength for functional tasks  4. Pt improve impaired knee extension ROM by 5 deg to improve mobility for normal movement patterns.   Long Term Goals (8 Weeks):  1. Pt will improve FOTO score to </= TBD% limited to decrease perceived limitation with mobility  2. Pt will demonstrate improved single leg loading without pain limitation to facilitate return to exercise classes.   3. Pt will improve impaired LE MMTs by 1 grade to improve strength for functional tasks.  4. Pt improve impaired knee ROM to WNL in all  planes to improve mobility for normal movement patterns.   5. Pt will report pain of </=1/10 with ADLs to improve QOL and tolerance to activity.   Plan      Plan of care Certification: 3/18/2024 to 6/18/24.     Outpatient Physical Therapy 3 times weekly for 12 weeks to include the following interventions: Cervical/Lumbar Traction, Electrical Stimulation , Gait Training, Manual Therapy, Moist Heat/ Ice, Neuromuscular Re-ed, Patient Education, Self Care, Therapeutic Activities, Therapeutic Exercise, and Ultrasound.     Mian Jade, PT

## 2024-04-05 ENCOUNTER — CLINICAL SUPPORT (OUTPATIENT)
Dept: REHABILITATION | Facility: HOSPITAL | Age: 69
End: 2024-04-05
Payer: COMMERCIAL

## 2024-04-05 DIAGNOSIS — R29.898 WEAKNESS OF BOTH HIPS: Primary | ICD-10-CM

## 2024-04-05 DIAGNOSIS — M25.662 DECREASED RANGE OF MOTION OF BOTH KNEES: ICD-10-CM

## 2024-04-05 DIAGNOSIS — M25.661 DECREASED RANGE OF MOTION OF BOTH KNEES: ICD-10-CM

## 2024-04-05 PROCEDURE — 97112 NEUROMUSCULAR REEDUCATION: CPT | Performed by: PHYSICAL THERAPIST

## 2024-04-05 PROCEDURE — 97140 MANUAL THERAPY 1/> REGIONS: CPT | Performed by: PHYSICAL THERAPIST

## 2024-04-07 NOTE — PROGRESS NOTES
[facility]   Physical Therapy Treatment Note      Name: Dasia Henning  Clinic Number: [id number]    Therapy Diagnosis:   Encounter Diagnoses   Name Primary?    Weakness of both hips Yes    Decreased range of motion of both knees        Physician: Williams, Claude S. IV,*    Visit Date: 4/8/2024  Physician Orders: PT Eval and Treat   Medical Diagnosis from Referral:   M25.562 (ICD-10-CM) - Left knee pain   Evaluation Date: 3/18/2024  Authorization Period Expiration: 12/31/24  Plan of Care Expiration: 6/18/24  Progress Note Due: 4/18/24  Visit # / Visits authorized: 1/ 1 5/20  FOTO: 5/5 ** Next Session      Precautions: Standard      Surgery: Knee Arthroscopy Performed by Claude Williams 10/27/23  Days Post-Op: 23 Weeks and 3 Days (4/8/24)    PTA Visit #: 0/5     Time In: 7:00 am   Time Out: 8:04 am  Total Billable Time: 64 minutes      Subjective     Pt reports: That Friday she was in a lot of pain, but over the weekend everything has calmed down and she is feeling better today.  She feels like her knee is extended better today.  She still gets some pain in the back of the knee with extension overpressure and had significant tenderness in the outside of the knee cap.      She was compliant with home exercise program.  Response to previous treatment: Initial aching   Functional change: No change     Pain: 4/10  Location:  Left Lateral Knee Around Patella and Popliteal Space      Objective      Objective Measures updated at progress report unless specified.     Treatment     Dasia received the treatments listed below:      therapeutic exercises to develop strength, endurance, ROM, posture, and core stabilization for 0 minutes including:    MH with passive knee extension 0 minutes increasing heel prop     manual therapy techniques: The techniques below were applied for 21 minutes:     Patellar Mobilizations 4 Ways Grade II-III  Infrapatellar Fat Pad Mobilizations   Tibiofemoral Hinge  Mobilizations for Extension Grade II-III  Tib/Fib Mobilizations P/A IV  Medial and Lateral Gapping of the Knee Grade III  Anterior Hip Mobilizations Grade III-IV    neuromuscular re-education activities to improve:  for 43 minutes. The following activities were included:    TKE - Seated Press into TheraBall and GTB Around Knee 3 x 15 3 Sec Hold  Bridges GTB Around Knees 2 x 10 5 Sec Holds   Side Lying Clamshells GTB 2 x 10 5 Sec Holds   Prone Knee Extended Hip Extension 2 x 12 2 Sec Hold   Hip Hinging with Dowel 2 x 15   Hip Hinging Sit to Stand with Dowel 2 x 8   Gait Analysis    Not Today:   Standing Heel Raises 2 x 12 3 Sec Holds   Quad set with Straight Leg Raise 5 Sec Hold 2 x 12  Short Arc Quad over Foam Roll 2 x 15 2 Sec Hold     therapeutic activities to improve functional performance for 0 minutes, including:    Patient Education    - Addition of Glut and Hip       hot pack for 0 minutes to .    cold pack for 0 minutes to .    Patient Education and Home Exercises       Education provided:   - HEP  - Plan of Care   - Relation of Proximal and Distal Joints to the Knee   - Anatomy and Physiology of the Knee Joint   - Patellar Tracking and Related Tissues     Written Home Exercises Provided: yes. Exercises were reviewed and Dasia was able to demonstrate them prior to the end of the session.  Dasia demonstrated good  understanding of the education provided. See EMR under Patient Instructions for exercises provided during therapy sessions    Assessment     Dasia presented to physical therapy with decreased pain levels compared to previous session and overall improvements in subjective reports.  Her extension has shown improvements but she remains slightly flexed compared to her non-operative knee and it is irritable when provided overpressure.  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  Session increased focus on improving active knee extension that she tolerated very well with new active intervention and decreased hip compensatory motions.  She worked to improve glut activation and strength while minimizing lumbar extension and showed good ability to dissociate motions and work to improve functional movement patterns and interdependence. Patient finished session with hip hinge training to facilitate lumbar and hip interdependence to bias increased glut activation and functional strengthening with sit to stand training.  She will continue to work to improve full knee extension to improve joint congruency and improve biomechanics of the knee while decrease the chronic load/stress from the constantly flexed knee.      Dasia Is progressing well towards her goals.   Pt prognosis is Good.     Pt will continue to benefit from skilled outpatient physical therapy to address the deficits listed in the problem list box on initial evaluation, provide pt/family education and to maximize pt's level of independence in the home and community environment.     Pt's spiritual, cultural and educational needs considered and pt agreeable to plan of care and goals.     Anticipated barriers to physical therapy: None at this time     Goals:   Short Term Goals (4 Weeks):   1. Pt will be compliant with HEP to supplement PT in restoring pain free function.  2. Pt will squat form to improve posterior chain loading and glut activation and decrease anterior knee loading.   3. Pt will improve impaired LE MMTs by 1/2 grade  to improve strength for functional tasks  4. Pt improve impaired knee extension ROM by 5 deg to improve mobility for normal movement patterns.   Long Term Goals (8 Weeks):  1. Pt will improve FOTO score to </= TBD% limited to decrease perceived limitation with mobility  2. Pt will demonstrate improved single leg loading without pain limitation to facilitate return to exercise classes.   3. Pt will  improve impaired LE MMTs by 1 grade to improve strength for functional tasks.  4. Pt improve impaired knee ROM to WNL in all planes to improve mobility for normal movement patterns.   5. Pt will report pain of </=1/10 with ADLs to improve QOL and tolerance to activity.   Plan      Plan of care Certification: 3/18/2024 to 6/18/24.     Outpatient Physical Therapy 3 times weekly for 12 weeks to include the following interventions: Cervical/Lumbar Traction, Electrical Stimulation , Gait Training, Manual Therapy, Moist Heat/ Ice, Neuromuscular Re-ed, Patient Education, Self Care, Therapeutic Activities, Therapeutic Exercise, and Ultrasound.     Mian Jade, PT   Co-Treat Tim Lynn PT, DPT, OCS, FAAOMPT

## 2024-04-08 ENCOUNTER — CLINICAL SUPPORT (OUTPATIENT)
Dept: REHABILITATION | Facility: HOSPITAL | Age: 69
End: 2024-04-08
Payer: COMMERCIAL

## 2024-04-08 DIAGNOSIS — M25.662 DECREASED RANGE OF MOTION OF BOTH KNEES: ICD-10-CM

## 2024-04-08 DIAGNOSIS — M25.661 DECREASED RANGE OF MOTION OF BOTH KNEES: ICD-10-CM

## 2024-04-08 DIAGNOSIS — R29.898 WEAKNESS OF BOTH HIPS: Primary | ICD-10-CM

## 2024-04-08 PROCEDURE — 97112 NEUROMUSCULAR REEDUCATION: CPT | Performed by: PHYSICAL THERAPIST

## 2024-04-08 PROCEDURE — 97140 MANUAL THERAPY 1/> REGIONS: CPT | Performed by: PHYSICAL THERAPIST

## 2024-04-08 NOTE — PROGRESS NOTES
[facility]   Physical Therapy Treatment Note      Name: Dasia Henning  Clinic Number: [id number]    Therapy Diagnosis:   Encounter Diagnoses   Name Primary?    Weakness of both hips Yes    Decreased range of motion of both knees        Physician: Williams, Claude S. IV,*    Visit Date: 4/5/2024  Physician Orders: PT Eval and Treat   Medical Diagnosis from Referral:   M25.562 (ICD-10-CM) - Left knee pain   Evaluation Date: 3/18/2024  Authorization Period Expiration: 12/31/24  Plan of Care Expiration: 6/18/24  Progress Note Due: 4/18/24  Visit # / Visits authorized: 1/ 1 4/20  FOTO: 4/5     Precautions: Standard      Surgery: Knee Arthroscopy Performed by Claude Williams 10/27/23  Days Post-Op: 21 Weeks and 5 Days (3/27/24)    PTA Visit #: 0/5     Time In: 7:00 am   Time Out: 8:04 am  Total Billable Time: 49 minutes      Subjective     Pt reports: Having a very tough day today, not sure why. Felt very good after the previous PT appointment and had a lot of walking the previous 2 days. Is having increased back, leg, and neck pain.     She was compliant with home exercise program.  Response to previous treatment: Initial aching   Functional change: No change     Pain: 4/10  Location:  Left Lateral Knee Around Patella and Popliteal Space      Objective      Objective Measures updated at progress report unless specified.     Treatment     Dasia received the treatments listed below:      therapeutic exercises to develop strength, endurance, ROM, posture, and core stabilization for 00 minutes including:    manual therapy techniques: The techniques below were applied for 25 minutes:     Patellar Mobilizations 4 Ways Grade II-III  Infrapatellar Fat Pad Mobilizations   Tibiofemoral Hinge Mobilizations for Extension Grade II-III  Tib/Fib Mobilizations P/A IV  SL lumbar distraction, III/IV  Lower lumbar gapping mobilization R    Not Today:   Posterior Tibial Mobilizations Grade II-III  Anterior Tibial  Mobilizations Grade II-III  Medial and Lateral Gapping of the Knee Grade III      neuromuscular re-education activities to improve:  for 24 minutes. The following activities were included:    Quad set with Straight Leg Raise 5 Sec Hold 2 x 12  Short Arc Quad over Foam Roll 2 x 15 2 Sec Hold   Bridges GTB Around Knees 2 x 10 5 Sec Holds   Side Lying Clamshells GTB 2 x 10 5 Sec Holds   Supine SLR sliders 25 x    Not Today:   Standing Heel Raises 2 x 12 3 Sec Holds       therapeutic activities to improve functional performance for 0 minutes, including:    Patient Education    - Addition of Glut and Hip       hot pack for 0 minutes to .    cold pack for 0 minutes to .    Patient Education and Home Exercises       Education provided:   - HEP  - Plan of Care   - Relation of Proximal and Distal Joints to the Knee   - Anatomy and Physiology of the Knee Joint   - Patellar Tracking and Related Tissues     Written Home Exercises Provided: yes. Exercises were reviewed and Dasia was able to demonstrate them prior to the end of the session.  Dasia demonstrated good  understanding of the education provided. See EMR under Patient Instructions for exercises provided during therapy sessions    Assessment   Presented in increased distress today secondary to increased activity the previous 2 days. Lumbar irritation is driving some posterior leg irritation, tolerates extension better in supine. Did well with lumbar distraction and gapping mobilizations. SLR was negative however spinal positioning did effect her knee extension. Responded well to supine sliders. Will need to incorporate lumbo pelvic stability training and squat training.     Dasia Is progressing well towards her goals.   Pt prognosis is Good.     Pt will continue to benefit from skilled outpatient physical therapy to address the deficits listed in the problem list box on initial evaluation, provide pt/family education and to maximize pt's level of independence in the home  and community environment.     Pt's spiritual, cultural and educational needs considered and pt agreeable to plan of care and goals.     Anticipated barriers to physical therapy: None at this time     Goals:   Short Term Goals (4 Weeks):   1. Pt will be compliant with HEP to supplement PT in restoring pain free function.  2. Pt will squat form to improve posterior chain loading and glut activation and decrease anterior knee loading.   3. Pt will improve impaired LE MMTs by 1/2 grade  to improve strength for functional tasks  4. Pt improve impaired knee extension ROM by 5 deg to improve mobility for normal movement patterns.   Long Term Goals (8 Weeks):  1. Pt will improve FOTO score to </= TBD% limited to decrease perceived limitation with mobility  2. Pt will demonstrate improved single leg loading without pain limitation to facilitate return to exercise classes.   3. Pt will improve impaired LE MMTs by 1 grade to improve strength for functional tasks.  4. Pt improve impaired knee ROM to WNL in all planes to improve mobility for normal movement patterns.   5. Pt will report pain of </=1/10 with ADLs to improve QOL and tolerance to activity.   Plan      Plan of care Certification: 3/18/2024 to 6/18/24.     Outpatient Physical Therapy 3 times weekly for 12 weeks to include the following interventions: Cervical/Lumbar Traction, Electrical Stimulation , Gait Training, Manual Therapy, Moist Heat/ Ice, Neuromuscular Re-ed, Patient Education, Self Care, Therapeutic Activities, Therapeutic Exercise, and Ultrasound.     Tim Lynn, PT

## 2024-04-11 ENCOUNTER — CLINICAL SUPPORT (OUTPATIENT)
Dept: REHABILITATION | Facility: HOSPITAL | Age: 69
End: 2024-04-11
Payer: COMMERCIAL

## 2024-04-11 DIAGNOSIS — R29.898 WEAKNESS OF BOTH HIPS: Primary | ICD-10-CM

## 2024-04-11 DIAGNOSIS — M25.661 DECREASED RANGE OF MOTION OF BOTH KNEES: ICD-10-CM

## 2024-04-11 DIAGNOSIS — M25.662 DECREASED RANGE OF MOTION OF BOTH KNEES: ICD-10-CM

## 2024-04-11 PROCEDURE — 97112 NEUROMUSCULAR REEDUCATION: CPT | Performed by: PHYSICAL THERAPIST

## 2024-04-11 PROCEDURE — 97140 MANUAL THERAPY 1/> REGIONS: CPT | Performed by: PHYSICAL THERAPIST

## 2024-04-11 NOTE — PROGRESS NOTES
"[facility]   Physical Therapy Treatment Note      Name: Dasia Henning  Clinic Number: [id number]    Therapy Diagnosis:   Encounter Diagnoses   Name Primary?    Weakness of both hips Yes    Decreased range of motion of both knees        Physician: No ref. provider found    Visit Date: 4/11/2024  Physician Orders: PT Eval and Treat   Medical Diagnosis from Referral:   M25.562 (ICD-10-CM) - Left knee pain   Evaluation Date: 3/18/2024  Authorization Period Expiration: 12/31/24  Plan of Care Expiration: 6/18/24  Progress Note Due: 4/18/24  Visit # / Visits authorized: 1/ 1 5/20  FOTO: 1/5      Precautions: Standard      Surgery: Knee Arthroscopy Performed by Claude Williams 10/27/23  Days Post-Op: 23 Weeks and 3 Days (4/8/24)    PTA Visit #: 0/5     Time In: 1602  Time Out: 1715  Total Billable Time: 51 minutes      Subjective     Pt reports: Had a great day yesterday but is having more back and neck pain today.     She was compliant with home exercise program.  Response to previous treatment: Initial aching   Functional change: No change     Pain: 4/10  Location:  Left Lateral Knee Around Patella and Popliteal Space      Objective      Objective Measures updated at progress report unless specified.     Treatment     Dasia received the treatments listed below:      therapeutic exercises to develop strength, endurance, ROM, posture, and core stabilization for 0 minutes including:    manual therapy techniques: The techniques below were applied for 10 minutes:     Patellar Mobilizations 4 Ways Grade II-III  Infrapatellar Fat Pad Mobilizations   Tibiofemoral Hinge Mobilizations for Extension Grade II-III  Tib/Fib Mobilizations P/A IV  Medial and Lateral Gapping of the Knee Grade III  Anterior Hip Mobilizations Grade III-IV  Lumbar distraction, gross IV    neuromuscular re-education activities to improve:  for 40 minutes. The following activities were included:  Abdominal brace 10 x 10"  Bridges GTB " Around Knees 2 x 10 5 Sec Holds   Side Lying Clamshells GTB 2 x 10 5 Sec Holds   Prone Knee Extended Hip Extension 2 x 12 2 Sec Hold   Hip Hinging Sit to Stand with Dowel 2 x 8     Not Today:   Standing Heel Raises 2 x 12 3 Sec Holds   Quad set with Straight Leg Raise 5 Sec Hold 2 x 12  Short Arc Quad over Foam Roll 2 x 15 2 Sec Hold     therapeutic activities to improve functional performance for 0 minutes, including:    Patient Education    - Addition of Glut and Hip       hot pack for 0 minutes to .    cold pack for 0 minutes to .    Patient Education and Home Exercises       Education provided:   - HEP  - Plan of Care   - Relation of Proximal and Distal Joints to the Knee   - Anatomy and Physiology of the Knee Joint   - Patellar Tracking and Related Tissues     Written Home Exercises Provided: yes. Exercises were reviewed and Dasia was able to demonstrate them prior to the end of the session.  Dasia demonstrated good  understanding of the education provided. See EMR under Patient Instructions for exercises provided during therapy sessions    Assessment     Continues to present with a combination of knee and lumbar spine dysfunction complicating the case. Unfortunately we are not seeing significant carryover of knee extension ROM. Without improvements of knee extension it will be difficult to reduce her knee/low back pain. She does show improved hip strength and core control which is encouraging, though I am concerned that joint dysfunction in the knee will limit our overall progress.    Dasia Is progressing well towards her goals.   Pt prognosis is Good.     Pt will continue to benefit from skilled outpatient physical therapy to address the deficits listed in the problem list box on initial evaluation, provide pt/family education and to maximize pt's level of independence in the home and community environment.     Pt's spiritual, cultural and educational needs considered and pt agreeable to plan of care and  goals.     Anticipated barriers to physical therapy: None at this time     Goals:   Short Term Goals (4 Weeks):   1. Pt will be compliant with HEP to supplement PT in restoring pain free function.  2. Pt will squat form to improve posterior chain loading and glut activation and decrease anterior knee loading.   3. Pt will improve impaired LE MMTs by 1/2 grade  to improve strength for functional tasks  4. Pt improve impaired knee extension ROM by 5 deg to improve mobility for normal movement patterns.   Long Term Goals (8 Weeks):  1. Pt will improve FOTO score to </= TBD% limited to decrease perceived limitation with mobility  2. Pt will demonstrate improved single leg loading without pain limitation to facilitate return to exercise classes.   3. Pt will improve impaired LE MMTs by 1 grade to improve strength for functional tasks.  4. Pt improve impaired knee ROM to WNL in all planes to improve mobility for normal movement patterns.   5. Pt will report pain of </=1/10 with ADLs to improve QOL and tolerance to activity.   Plan      Plan of care Certification: 3/18/2024 to 6/18/24.     Outpatient Physical Therapy 3 times weekly for 12 weeks to include the following interventions: Cervical/Lumbar Traction, Electrical Stimulation , Gait Training, Manual Therapy, Moist Heat/ Ice, Neuromuscular Re-ed, Patient Education, Self Care, Therapeutic Activities, Therapeutic Exercise, and Ultrasound.     Tim Lynn, PT

## 2024-04-15 ENCOUNTER — CLINICAL SUPPORT (OUTPATIENT)
Dept: REHABILITATION | Facility: HOSPITAL | Age: 69
End: 2024-04-15
Payer: COMMERCIAL

## 2024-04-15 DIAGNOSIS — M25.662 DECREASED RANGE OF MOTION OF BOTH KNEES: ICD-10-CM

## 2024-04-15 DIAGNOSIS — M25.661 DECREASED RANGE OF MOTION OF BOTH KNEES: ICD-10-CM

## 2024-04-15 DIAGNOSIS — R29.898 WEAKNESS OF BOTH HIPS: Primary | ICD-10-CM

## 2024-04-15 PROCEDURE — 97112 NEUROMUSCULAR REEDUCATION: CPT | Performed by: PHYSICAL THERAPIST

## 2024-04-15 PROCEDURE — 97530 THERAPEUTIC ACTIVITIES: CPT | Performed by: PHYSICAL THERAPIST

## 2024-04-15 PROCEDURE — 97140 MANUAL THERAPY 1/> REGIONS: CPT | Performed by: PHYSICAL THERAPIST

## 2024-04-15 NOTE — PROGRESS NOTES
[facility]   Physical Therapy Treatment Note      Name: Dasia Henning  Clinic Number: [id number]    Therapy Diagnosis:   Encounter Diagnoses   Name Primary?    Weakness of both hips Yes    Decreased range of motion of both knees        Physician: Williams, Claude S. IV,*    Visit Date: 4/15/2024  Physician Orders: PT Eval and Treat   Medical Diagnosis from Referral:   M25.562 (ICD-10-CM) - Left knee pain   Evaluation Date: 3/18/2024  Authorization Period Expiration: 12/31/24  Plan of Care Expiration: 6/18/24  Progress Note Due: 4/18/24  Visit # / Visits authorized: 1/ 1 5/20  FOTO: 1/5      Precautions: Standard      Surgery: Knee Arthroscopy Performed by Claude Williams 10/27/23  Days Post-Op: 23 Weeks and 3 Days (4/8/24)    PTA Visit #: 0/5     Time In: 0658  Time Out: 0805  Total Billable Time: 60 minutes      Subjective     Pt reports: Overall feeling better. Has issues with stiffness after prolonged immobility. Wants to continue to exercise.     She was compliant with home exercise program.  Response to previous treatment: Initial aching   Functional change: No change     Pain: 4/10  Location:  Left Lateral Knee Around Patella and Popliteal Space      Objective      Objective Measures updated at progress report unless specified.     Treatment     Dasia received the treatments listed below:      therapeutic exercises to develop strength, endurance, ROM, posture, and core stabilization for 0 minutes including:    manual therapy techniques: The techniques below were applied for 10 minutes:     Patellar Mobilizations 4 Ways Grade II-III  Infrapatellar Fat Pad Mobilizations   Tibiofemoral Hinge Mobilizations for Extension Grade II-III  Tib/Fib Mobilizations P/A IV  Medial and Lateral Gapping of the Knee Grade III  Anterior Hip Mobilizations Grade III-IV  Lumbar distraction, gross IV    neuromuscular re-education activities to improve:  for 40 minutes. The following activities were  "included:  Abdominal brace 10 x 10"  Abdominal brace to march 2 x 10  SLR 2 x 10 (towel under knee)  Bridges GTB Around Knees 2 x 10 5 Sec Holds   Prone Knee Extended Hip Extension 2 x 12 2 Sec Hold   Lateral ABD walks GTB 10 laps at map      Not Today:   Standing Heel Raises 2 x 12 3 Sec Holds   Quad set with Straight Leg Raise 5 Sec Hold 2 x 12  Short Arc Quad over Foam Roll 2 x 15 2 Sec Hold     therapeutic activities to improve functional performance for 10 minutes, including:  Sit to stand training series  DL leg press 80 lbs 3 x 15      hot pack for 0 minutes to .    cold pack for 0 minutes to .    Patient Education and Home Exercises       Education provided:   - HEP  - Plan of Care   - Relation of Proximal and Distal Joints to the Knee   - Anatomy and Physiology of the Knee Joint   - Patellar Tracking and Related Tissues     Written Home Exercises Provided: yes. Exercises were reviewed and Dasia was able to demonstrate them prior to the end of the session.  Dasia demonstrated good  understanding of the education provided. See EMR under Patient Instructions for exercises provided during therapy sessions    Assessment     Able to progress with functional loading today which is encouraging. Knee extension ROM continues to be impaired, unlikely to improve significantly. Squat technique is improving and able to load through range. Will continue to focus on BLE and core strength. Mobility training where appropriate    Dasia Is progressing well towards her goals.   Pt prognosis is Good.     Pt will continue to benefit from skilled outpatient physical therapy to address the deficits listed in the problem list box on initial evaluation, provide pt/family education and to maximize pt's level of independence in the home and community environment.     Pt's spiritual, cultural and educational needs considered and pt agreeable to plan of care and goals.     Anticipated barriers to physical therapy: None at this time "     Goals:   Short Term Goals (4 Weeks):   1. Pt will be compliant with HEP to supplement PT in restoring pain free function.  2. Pt will squat form to improve posterior chain loading and glut activation and decrease anterior knee loading.   3. Pt will improve impaired LE MMTs by 1/2 grade  to improve strength for functional tasks  4. Pt improve impaired knee extension ROM by 5 deg to improve mobility for normal movement patterns.   Long Term Goals (8 Weeks):  1. Pt will improve FOTO score to </= TBD% limited to decrease perceived limitation with mobility  2. Pt will demonstrate improved single leg loading without pain limitation to facilitate return to exercise classes.   3. Pt will improve impaired LE MMTs by 1 grade to improve strength for functional tasks.  4. Pt improve impaired knee ROM to WNL in all planes to improve mobility for normal movement patterns.   5. Pt will report pain of </=1/10 with ADLs to improve QOL and tolerance to activity.   Plan      Plan of care Certification: 3/18/2024 to 6/18/24.     Outpatient Physical Therapy 3 times weekly for 12 weeks to include the following interventions: Cervical/Lumbar Traction, Electrical Stimulation , Gait Training, Manual Therapy, Moist Heat/ Ice, Neuromuscular Re-ed, Patient Education, Self Care, Therapeutic Activities, Therapeutic Exercise, and Ultrasound.     Tim Lynn, PT

## 2024-04-22 ENCOUNTER — CLINICAL SUPPORT (OUTPATIENT)
Dept: REHABILITATION | Facility: HOSPITAL | Age: 69
End: 2024-04-22
Payer: COMMERCIAL

## 2024-04-22 DIAGNOSIS — M25.662 DECREASED RANGE OF MOTION OF BOTH KNEES: ICD-10-CM

## 2024-04-22 DIAGNOSIS — R29.898 WEAKNESS OF BOTH HIPS: Primary | ICD-10-CM

## 2024-04-22 DIAGNOSIS — M25.661 DECREASED RANGE OF MOTION OF BOTH KNEES: ICD-10-CM

## 2024-04-22 PROCEDURE — 97112 NEUROMUSCULAR REEDUCATION: CPT

## 2024-04-22 PROCEDURE — 97140 MANUAL THERAPY 1/> REGIONS: CPT

## 2024-04-22 NOTE — PROGRESS NOTES
[facility]   Physical Therapy Treatment Note      Name: Dasia Henning  Clinic Number: [id number]    Therapy Diagnosis:   Encounter Diagnoses   Name Primary?    Weakness of both hips Yes    Decreased range of motion of both knees      Physician: Williams, Claude S. IV,*    Visit Date: 4/22/2024  Physician Orders: PT Eval and Treat   Medical Diagnosis from Referral:   M25.562 (ICD-10-CM) - Left knee pain   Evaluation Date: 3/18/2024  Authorization Period Expiration: 12/31/24  Plan of Care Expiration: 6/18/24  Progress Note Due: 4/18/24  Visit # / Visits authorized: 1/ 1 9/20  FOTO: 5/5 4/5      Precautions: Standard      Surgery: Knee Arthroscopy Performed by Claude Williams 10/27/23  Days Post-Op: 23 Weeks and 3 Days (4/8/24)    PTA Visit #: 0/5     Time In: 7:02 am   Time Out: 8:00 am   Total Billable Time: 58 minutes      Subjective     Pt reports: That she is feeling better and really is enjoying the progress she is making.  She is scheduled to start with the [facility] tomorrow to improve her fitness and general health.  She has switched to taking her medication at night and would like to see if that improves her distal symptoms.      She was compliant with home exercise program.  Response to previous treatment: Initial aching   Functional change: No change     Pain: 4/10  Location:  Left Lateral Knee Around Patella and Popliteal Space      Objective      Objective Measures updated at progress report unless specified.     Treatment     Dasia received the treatments listed below:      therapeutic exercises to develop strength, endurance, ROM, posture, and core stabilization for 0 minutes including:    manual therapy techniques: The techniques below were applied for 14 minutes:     Patellar Mobilizations 4 Ways Grade II-III  Infrapatellar Fat Pad Mobilizations   Tibiofemoral Hinge Mobilizations for Extension Grade II-III    Not Today:   Tib/Fib Mobilizations P/A IV  Medial and  "Lateral Gapping of the Knee Grade III  Anterior Hip Mobilizations Grade III-IV  Lumbar distraction, gross IV    neuromuscular re-education activities to improve:  for 44 minutes. The following activities were included:    Seated Hamstring Stretch for Nerve Mobility and Knee Extension   Box Squats (Cues for Hip Hinging) 3 x 12   Box Squat with TKE GTB (Cues for Hip Hinging) 3 x 10   Closed Chain TheraBall TKE GTB 2 x 15   Shuttle Squat 2 Black Bands with TKE GTB 2 x 15   Shuttle Squat Heel Raise 2 Black Bands 3 x 10   Lateral ABD Walks GTB 3 Laps of 15 Yards   6" Step Ups 2 x 12   Quad Sets on 1/2 Foam Roll 3 x 15 3 Sec Holds     Not Today:   Abdominal brace 10 x 10"  Abdominal brace to march 2 x 10  SLR 2 x 10 (towel under knee)  Bridges GTB Around Knees 2 x 10 5 Sec Holds   Prone Knee Extended Hip Extension 2 x 12 2 Sec Hold   Standing Heel Raises 2 x 12 3 Sec Holds   Quad set with Straight Leg Raise 5 Sec Hold 2 x 12  Short Arc Quad over Foam Roll 2 x 15 2 Sec Hold     therapeutic activities to improve functional performance for 0 minutes, including:  Sit to stand training series  DL leg press 80 lbs 3 x 15      hot pack for 0 minutes to .    cold pack for 0 minutes to .    Patient Education and Home Exercises       Education provided:   - HEP  - Plan of Care   - Relation of Proximal and Distal Joints to the Knee   - Anatomy and Physiology of the Knee Joint   - Patellar Tracking and Related Tissues     Written Home Exercises Provided: yes. Exercises were reviewed and Dasia was able to demonstrate them prior to the end of the session.  Dasia demonstrated good  understanding of the education provided. See EMR under Patient Instructions for exercises provided during therapy sessions    Assessment     Dasia presents to physical therapy with reports of overall improvement in symptoms and functional abilities.  She worked to improve knee extension and neural mobility with seated hamstring stretching without compromising " lumbar positioning and individual joint mechanics were performed to reinforce knee extension and patellar mobility which she tolerated better today than previous sessions with less stiffness present in the infrapatellar fat pad.  She worked to improve glut loading with hip hinging patterns in the box squats to facilitate cue of sitting back into the hips.  This was in conjunction with working resisted terminal knee extension.  She continued to increase loading to improve quadriceps strengthening with shuttle squatting with cueing and resistance into extension. The goal was to improve active contraction and loading into her maximal extension.  She completed the session with improvement of lateral hip strengthening that was progressed with standing band walks.  She will continue to work through improvements in lower extremity strengthening and return focus to core stabilization to combat adverse neural events.      Dasia Is progressing well towards her goals.   Pt prognosis is Good.     Pt will continue to benefit from skilled outpatient physical therapy to address the deficits listed in the problem list box on initial evaluation, provide pt/family education and to maximize pt's level of independence in the home and community environment.     Pt's spiritual, cultural and educational needs considered and pt agreeable to plan of care and goals.     Anticipated barriers to physical therapy: None at this time     Goals:   Short Term Goals (4 Weeks):   1. Pt will be compliant with HEP to supplement PT in restoring pain free function.  2. Pt will squat form to improve posterior chain loading and glut activation and decrease anterior knee loading.   3. Pt will improve impaired LE MMTs by 1/2 grade  to improve strength for functional tasks  4. Pt improve impaired knee extension ROM by 5 deg to improve mobility for normal movement patterns.   Long Term Goals (8 Weeks):  1. Pt will improve FOTO score to </= TBD% limited to  decrease perceived limitation with mobility  2. Pt will demonstrate improved single leg loading without pain limitation to facilitate return to exercise classes.   3. Pt will improve impaired LE MMTs by 1 grade to improve strength for functional tasks.  4. Pt improve impaired knee ROM to WNL in all planes to improve mobility for normal movement patterns.   5. Pt will report pain of </=1/10 with ADLs to improve QOL and tolerance to activity.   Plan      Plan of care Certification: 3/18/2024 to 6/18/24.     Outpatient Physical Therapy 3 times weekly for 12 weeks to include the following interventions: Cervical/Lumbar Traction, Electrical Stimulation , Gait Training, Manual Therapy, Moist Heat/ Ice, Neuromuscular Re-ed, Patient Education, Self Care, Therapeutic Activities, Therapeutic Exercise, and Ultrasound.     Mian Jade, PT

## 2024-04-23 DIAGNOSIS — M25.562 PAIN IN LEFT KNEE: Primary | ICD-10-CM

## 2024-04-24 ENCOUNTER — CLINICAL SUPPORT (OUTPATIENT)
Dept: REHABILITATION | Facility: HOSPITAL | Age: 69
End: 2024-04-24
Payer: COMMERCIAL

## 2024-04-24 DIAGNOSIS — M25.662 DECREASED RANGE OF MOTION OF BOTH KNEES: ICD-10-CM

## 2024-04-24 DIAGNOSIS — R29.898 WEAKNESS OF BOTH HIPS: Primary | ICD-10-CM

## 2024-04-24 DIAGNOSIS — M25.661 DECREASED RANGE OF MOTION OF BOTH KNEES: ICD-10-CM

## 2024-04-24 PROCEDURE — 97112 NEUROMUSCULAR REEDUCATION: CPT

## 2024-04-24 PROCEDURE — 97530 THERAPEUTIC ACTIVITIES: CPT

## 2024-04-24 NOTE — PROGRESS NOTES
[facility]   Physical Therapy Treatment Note      Name: Dasia Henning  Clinic Number: [id number]    Therapy Diagnosis:   Encounter Diagnoses   Name Primary?    Weakness of both hips Yes    Decreased range of motion of both knees      Physician: Williams, Claude S. IV,*    Visit Date: 4/24/2024  Physician Orders: PT Eval and Treat   Medical Diagnosis from Referral:   M25.562 (ICD-10-CM) - Left knee pain   Evaluation Date: 3/18/2024  Authorization Period Expiration: 12/31/24  Plan of Care Expiration: 6/18/24  Progress Note Due: 4/18/24  Visit # / Visits authorized: 1/ 1 10/20  FOTO: 5/5 5/5 ** Next Visit      Precautions: Standard      Surgery: Knee Arthroscopy Performed by Claude Williams 10/27/23  Days Post-Op: 23 Weeks and 3 Days (4/8/24)**    PTA Visit #: 0/5     Time In: 7:00 am   Time Out: 8:03 am   Total Billable Time: 63 minutes      Subjective     Pt reports: That she is doing well.  She feels some stiffness in the knee because she just woke up but overall she doesn't feel a lot of pain.  She started with the [facility] yesterday for a consultation and she is excited to begin but she would appreciate the coordination between PT and [facility].      She was compliant with home exercise program.  Response to previous treatment: Initial aching   Functional change: No change     Pain: 4/10  Location:  Left Lateral Knee Around Patella and Popliteal Space      Objective      Objective Measures updated at progress report unless specified.     Treatment     Dasia received the treatments listed below:      therapeutic exercises to develop strength, endurance, ROM, posture, and core stabilization for 0 minutes including:    manual therapy techniques: The techniques below were applied for 4 minutes:     Patellar Mobilizations 4 Ways Grade II-III  Infrapatellar Fat Pad Mobilizations   Tibiofemoral Hinge Mobilizations for Extension Grade II-III    Not Today:   Tib/Fib Mobilizations P/A  "IV  Medial and Lateral Gapping of the Knee Grade III  Anterior Hip Mobilizations Grade III-IV  Lumbar distraction, gross IV    neuromuscular re-education activities to improve:  for 51 minutes. The following activities were included:    Seated Hamstring Stretch for Nerve Mobility and Knee Extension   Standing TKE GTB 2 x 12 3 Sec   Quad Sets on 1/2 Foam Roll 1 x 16 5 Sec Holds   TA Activation with Biofeedback 10 x 10 Sec   TA Activation with Biofeedback with Hip Fall Outs 2 x 12   TA Activation with Biofeedback with Marches 2 x 12   Bridges GTB Around Knees 2 x 10 5 Sec Holds   Palloff Press GTB 2 x 10   Lateral ABD Walks GTB 6 Laps of 15 Yards   6" Step Ups 2 x 12     Not Today:   Box Squats (Cues for Hip Hinging) 3 x 12   Box Squat with TKE GTB (Cues for Hip Hinging) 3 x 10   Closed Chain TheraBall TKE GTB 2 x 15   Shuttle Squat 2 Black Bands with TKE GTB 2 x 15   Shuttle Squat Heel Raise 2 Black Bands 3 x 10   SLR 2 x 10 (towel under knee)  Prone Knee Extended Hip Extension 2 x 12 2 Sec Hold   Standing Heel Raises 2 x 12 3 Sec Holds   Quad set with Straight Leg Raise 5 Sec Hold 2 x 12  Short Arc Quad over Foam Roll 2 x 15 2 Sec Hold     therapeutic activities to improve functional performance for 8 minutes, including:    Upright Bike to facilitate active range of motion, endurance, and strengthening through reciprocal range of motion     Not Today:   Sit to stand training series  DL leg press 80 lbs 3 x 15      hot pack for 0 minutes to .    cold pack for 0 minutes to .    Patient Education and Home Exercises       Education provided:   - HEP  - Plan of Care   - Relation of Proximal and Distal Joints to the Knee   - Anatomy and Physiology of the Knee Joint   - Patellar Tracking and Related Tissues     Written Home Exercises Provided: yes. Exercises were reviewed and Dasia was able to demonstrate them prior to the end of the session.  Dasia demonstrated good  understanding of the education provided. See EMR under " Patient Instructions for exercises provided during therapy sessions    Assessment     Dasia presents to physical therapy with overall reports of improvement and motivation to continue to work to improve strength and overall fitness levels.  She continued to work to improve hamstring and sciatic nerve mobility with neutral spine to decrease neural tension.  She continued to have deficits in knee extension but she is showing improved tolerance into her available knee extension.  She continued to work into loading the knee extension and showing improved tolerance.  She returned to working on improving lumbar core stabilization with addition of biofeedback to improve quality of repetitions.  With solely working on TA activation patient showed good ability to maintain core contraction without significant fluctuation, however with addition of hip motions patient had significant difficulty and has very poor lumbar/hip interdependence that will continue to work to improve to decrease shearing through the low back and compromising optimal neural mobility.  This will additional include improvements in hip mobility to decrease strain through the anterior knee and low back.  She finished  upright cycling to improve active assistive range of motion with cyclical loading to improve mobility, functional muscle activity, build endurance, and break down lactic acid.  She will continue to work through improvements in lower extremity strengthening and return focus to core stabilization to combat adverse neural events.      Dasia Is progressing well towards her goals.   Pt prognosis is Good.     Pt will continue to benefit from skilled outpatient physical therapy to address the deficits listed in the problem list box on initial evaluation, provide pt/family education and to maximize pt's level of independence in the home and community environment.     Pt's spiritual, cultural and educational needs considered and pt agreeable to plan of  care and goals.     Anticipated barriers to physical therapy: None at this time     Goals:   Short Term Goals (4 Weeks):   1. Pt will be compliant with HEP to supplement PT in restoring pain free function.  2. Pt will squat form to improve posterior chain loading and glut activation and decrease anterior knee loading.   3. Pt will improve impaired LE MMTs by 1/2 grade  to improve strength for functional tasks  4. Pt improve impaired knee extension ROM by 5 deg to improve mobility for normal movement patterns.   Long Term Goals (8 Weeks):  1. Pt will improve FOTO score to </= TBD% limited to decrease perceived limitation with mobility  2. Pt will demonstrate improved single leg loading without pain limitation to facilitate return to exercise classes.   3. Pt will improve impaired LE MMTs by 1 grade to improve strength for functional tasks.  4. Pt improve impaired knee ROM to WNL in all planes to improve mobility for normal movement patterns.   5. Pt will report pain of </=1/10 with ADLs to improve QOL and tolerance to activity.   Plan      Plan of care Certification: 3/18/2024 to 6/18/24.     Outpatient Physical Therapy 3 times weekly for 12 weeks to include the following interventions: Cervical/Lumbar Traction, Electrical Stimulation , Gait Training, Manual Therapy, Moist Heat/ Ice, Neuromuscular Re-ed, Patient Education, Self Care, Therapeutic Activities, Therapeutic Exercise, and Ultrasound.     Mian Jade, PT

## 2024-04-29 ENCOUNTER — CLINICAL SUPPORT (OUTPATIENT)
Dept: REHABILITATION | Facility: HOSPITAL | Age: 69
End: 2024-04-29
Payer: COMMERCIAL

## 2024-04-29 DIAGNOSIS — M25.661 DECREASED RANGE OF MOTION OF BOTH KNEES: ICD-10-CM

## 2024-04-29 DIAGNOSIS — R29.898 WEAKNESS OF BOTH HIPS: Primary | ICD-10-CM

## 2024-04-29 DIAGNOSIS — M25.662 DECREASED RANGE OF MOTION OF BOTH KNEES: ICD-10-CM

## 2024-04-29 PROCEDURE — 97112 NEUROMUSCULAR REEDUCATION: CPT | Performed by: PHYSICAL THERAPIST

## 2024-04-29 PROCEDURE — 97530 THERAPEUTIC ACTIVITIES: CPT | Performed by: PHYSICAL THERAPIST

## 2024-04-29 NOTE — PROGRESS NOTES
[facility]   Physical Therapy Treatment Note      Name: Dasia Henning  Clinic Number: [id number]    Therapy Diagnosis:   Encounter Diagnoses   Name Primary?    Weakness of both hips Yes    Decreased range of motion of both knees      Physician: Williams, Claude S. IV,*    Visit Date: 4/29/2024  Physician Orders: PT Eval and Treat   Medical Diagnosis from Referral:   M25.562 (ICD-10-CM) - Left knee pain   Evaluation Date: 3/18/2024  Authorization Period Expiration: 12/31/24  Plan of Care Expiration: 6/18/24  Progress Note Due: 4/18/24  Visit # / Visits authorized: 1/ 1 11/20  FOTO: 5/5 5/5 ** Next Visit      Precautions: Standard      Surgery: Knee Arthroscopy Performed by Claude Williams 10/27/23  Days Post-Op: 23 Weeks and 3 Days (4/8/24)**    PTA Visit #: 0/5     Time In: 7:01 am   Time Out: 8:05 am   Total Billable Time: 63 minutes      Subjective     Pt reports: Woke up late so she is a bit stiff this morning.     She was compliant with home exercise program.  Response to previous treatment: Initial aching   Functional change: No change     Pain: 4/10  Location:  Left Lateral Knee Around Patella and Popliteal Space      Objective      Objective Measures updated at progress report unless specified.     Treatment     Dasia received the treatments listed below:      therapeutic exercises to develop strength, endurance, ROM, posture, and core stabilization for 0 minutes including:    manual therapy techniques: The techniques below were applied for 0 minutes:     Patellar Mobilizations 4 Ways Grade II-III  Infrapatellar Fat Pad Mobilizations   Tibiofemoral Hinge Mobilizations for Extension Grade II-III    Not Today:   Tib/Fib Mobilizations P/A IV  Medial and Lateral Gapping of the Knee Grade III  Anterior Hip Mobilizations Grade III-IV  Lumbar distraction, gross IV    neuromuscular re-education activities to improve:  for 25 minutes. The following activities were included:    Seated  Hamstring Stretch for Nerve Mobility and Knee Extension   Seated TKE 2 x 10  Standing TKE GTB 2 x 12 3 Sec   Quad Sets on 1/2 Foam Roll 1 x 16 5 Sec Holds       Not Today:   Box Squats (Cues for Hip Hinging) 3 x 12   Box Squat with TKE GTB (Cues for Hip Hinging) 3 x 10   Closed Chain TheraBall TKE GTB 2 x 15   Shuttle Squat 2 Black Bands with TKE GTB 2 x 15   Shuttle Squat Heel Raise 2 Black Bands 3 x 10   SLR 2 x 10 (towel under knee)  Prone Knee Extended Hip Extension 2 x 12 2 Sec Hold   Standing Heel Raises 2 x 12 3 Sec Holds   Quad set with Straight Leg Raise 5 Sec Hold 2 x 12  Short Arc Quad over Foam Roll 2 x 15 2 Sec Hold     therapeutic activities to improve functional performance for 25 minutes, including:    Upright Bike to facilitate active range of motion, endurance, and strengthening through reciprocal range of motion   Sit to stand training series  DL leg press 90 lbs 3 x 15      hot pack for 0 minutes to .    cold pack for 0 minutes to .    Patient Education and Home Exercises       Education provided:   - HEP  - Plan of Care   - Relation of Proximal and Distal Joints to the Knee   - Anatomy and Physiology of the Knee Joint   - Patellar Tracking and Related Tissues     Written Home Exercises Provided: yes. Exercises were reviewed and Dasia was able to demonstrate them prior to the end of the session.  Dasia demonstrated good  understanding of the education provided. See EMR under Patient Instructions for exercises provided during therapy sessions    Assessment     Able to work into knee extension in seated without issue. Improved sit to wilfrid/squat performance as she is learning to hip hinge. Still needs to focus on core and hip stability. Knee extension is unlikely to fully normalize.      Dasia Is progressing well towards her goals.   Pt prognosis is Good.     Pt will continue to benefit from skilled outpatient physical therapy to address the deficits listed in the problem list box on initial  evaluation, provide pt/family education and to maximize pt's level of independence in the home and community environment.     Pt's spiritual, cultural and educational needs considered and pt agreeable to plan of care and goals.     Anticipated barriers to physical therapy: None at this time     Goals:   Short Term Goals (4 Weeks):   1. Pt will be compliant with HEP to supplement PT in restoring pain free function.  2. Pt will squat form to improve posterior chain loading and glut activation and decrease anterior knee loading.   3. Pt will improve impaired LE MMTs by 1/2 grade  to improve strength for functional tasks  4. Pt improve impaired knee extension ROM by 5 deg to improve mobility for normal movement patterns.   Long Term Goals (8 Weeks):  1. Pt will improve FOTO score to </= TBD% limited to decrease perceived limitation with mobility  2. Pt will demonstrate improved single leg loading without pain limitation to facilitate return to exercise classes.   3. Pt will improve impaired LE MMTs by 1 grade to improve strength for functional tasks.  4. Pt improve impaired knee ROM to WNL in all planes to improve mobility for normal movement patterns.   5. Pt will report pain of </=1/10 with ADLs to improve QOL and tolerance to activity.   Plan      Plan of care Certification: 3/18/2024 to 6/18/24.     Outpatient Physical Therapy 3 times weekly for 12 weeks to include the following interventions: Cervical/Lumbar Traction, Electrical Stimulation , Gait Training, Manual Therapy, Moist Heat/ Ice, Neuromuscular Re-ed, Patient Education, Self Care, Therapeutic Activities, Therapeutic Exercise, and Ultrasound.     Tim Lynn, PT

## 2024-05-08 ENCOUNTER — CLINICAL SUPPORT (OUTPATIENT)
Dept: REHABILITATION | Facility: HOSPITAL | Age: 69
End: 2024-05-08
Payer: COMMERCIAL

## 2024-05-08 DIAGNOSIS — R29.898 WEAKNESS OF BOTH HIPS: Primary | ICD-10-CM

## 2024-05-08 DIAGNOSIS — M25.661 DECREASED RANGE OF MOTION OF BOTH KNEES: ICD-10-CM

## 2024-05-08 DIAGNOSIS — M25.662 DECREASED RANGE OF MOTION OF BOTH KNEES: ICD-10-CM

## 2024-05-08 PROCEDURE — 97112 NEUROMUSCULAR REEDUCATION: CPT

## 2024-05-08 PROCEDURE — 97530 THERAPEUTIC ACTIVITIES: CPT

## 2024-05-08 NOTE — PROGRESS NOTES
[facility]   Physical Therapy Treatment Note      Name: Dasia Henning  Clinic Number: [id number]    Therapy Diagnosis:   Encounter Diagnoses   Name Primary?    Weakness of both hips Yes    Decreased range of motion of both knees        Physician: Williams, Claude S. IV,*    Visit Date: 5/8/2024  Physician Orders: PT Eval and Treat   Medical Diagnosis from Referral:   M25.562 (ICD-10-CM) - Left knee pain   Evaluation Date: 3/18/2024  Authorization Period Expiration: 12/31/24  Plan of Care Expiration: 6/18/24  Progress Note Due: 4/18/24 ** Next Visit   Visit # / Visits authorized: 1/ 1 12/20  FOTO: 5/5 5/5 ** Next Visit      Precautions: Standard      Surgery: Knee Arthroscopy Performed by Claude Williams 10/27/23  Days Post-Op: 23 Weeks and 3 Days (4/8/24)**    PTA Visit #: 0/5     Time In: 7:00 am   Time Out: 8:00 am   Total Billable Time: 60 minutes      Subjective     Pt reports: That she got stuck in [place] on Monday, but she was able to walk around more without a lot of complications.  She had an exercise bike at her house in [place] and loved the way that made her feel and she wants to continue to build general strength through the leg.  She feels like her knee is straighter and she has been able to increase her exercise.      She was compliant with home exercise program.  Response to previous treatment: Initial aching   Functional change: No change     Pain: 4/10  Location:  Left Lateral Knee Around Patella and Popliteal Space      Objective      Objective Measures updated at progress report unless specified.     Reassessment Next Visit     Treatment     Dasia received the treatments listed below:      therapeutic exercises to develop strength, endurance, ROM, posture, and core stabilization for 0 minutes including:    manual therapy techniques: The techniques below were applied for 2 minutes:     Patellar Mobilizations 4 Ways Grade II-III  Infrapatellar Fat Pad Mobilizations    Tibiofemoral Hinge Mobilizations for Extension Grade II-III    Not Today:   Tib/Fib Mobilizations P/A IV  Medial and Lateral Gapping of the Knee Grade III  Anterior Hip Mobilizations Grade III-IV  Lumbar distraction, gross IV    neuromuscular re-education activities to improve:  for 19 minutes. The following activities were included:    Seated Quadriceps Sit to Stand Sciatic Nerve Glide 1 x 40   Quad Sets + SLR on 1/2 Foam Roll 3 x 10  Bridges Cue for Glut Activation GTB 3 x 8 3 Sec Holds   Clamshells Level 2 2 x 12      Not Today:   Seated TKE 2 x 10  Standing TKE GTB 2 x 12 3 Sec   Closed Chain TheraBall TKE GTB 2 x 15   Shuttle Squat Heel Raise 2 Black Bands 3 x 10   Prone Knee Extended Hip Extension 2 x 12 2 Sec Hold   Standing Heel Raises 2 x 12 3 Sec Holds   Short Arc Quad over Foam Roll 2 x 15 2 Sec Hold     therapeutic activities to improve functional performance for 39 minutes, including:    Upright Bike to facilitate active range of motion, endurance, and strengthening through reciprocal range of motion   Hip Hinging Progression      - Seated Hip Hinges with Dowel 1 x 18      - Standing Hip Hinges with Dowel 1 x 18      - Sit to Stand Hip Hinges with Dowel 1 x 18   Shuttle Double Leg Press 3 x 10 2  Black Bands GTB Around Knees   Shuttle Squat Heel Raise 2 Black Bands 2 x 12     hot pack for 0 minutes to .    cold pack for 0 minutes to .    Patient Education and Home Exercises       Education provided:   - HEP  - Plan of Care   - Relation of Proximal and Distal Joints to the Knee   - Anatomy and Physiology of the Knee Joint   - Patellar Tracking and Related Tissues     Written Home Exercises Provided: yes. Exercises were reviewed and Dasia was able to demonstrate them prior to the end of the session.  Dasia demonstrated good  understanding of the education provided. See EMR under Patient Instructions for exercises provided during therapy sessions    Assessment     Dasia presents to physical therapy with  positive subjective reports of improvement and improved confidence in strengthening and exercise.  She remains very tender through the lateral edge of the patella but is better able to tolerate active interventions into her terminal knee extension.  Her progression into full knee extension may be reaching a plateau but she is better able to load into her full knee extension and will continue to build strengthening, control, and tolerance into full knee extension.  Glut activation and strengthening is still vital for stability and offloading of the knee and low back for improving patient's daily function and pain levels and continues to be addressed with isolated strengthening and movement pattern control.  Patient is improving with hip hinging but continues to have challenge with hinging into glut activation.       Able to work into knee extension in seated without issue. Improved sit to wilfrid/squat performance as she is learning to hip hinge. Still needs to focus on core and hip stability. Knee extension is unlikely to fully normalize.      Dasia Is progressing well towards her goals.   Pt prognosis is Good.     Pt will continue to benefit from skilled outpatient physical therapy to address the deficits listed in the problem list box on initial evaluation, provide pt/family education and to maximize pt's level of independence in the home and community environment.     Pt's spiritual, cultural and educational needs considered and pt agreeable to plan of care and goals.     Anticipated barriers to physical therapy: None at this time     Goals:   Short Term Goals (4 Weeks):   1. Pt will be compliant with HEP to supplement PT in restoring pain free function.  2. Pt will squat form to improve posterior chain loading and glut activation and decrease anterior knee loading.   3. Pt will improve impaired LE MMTs by 1/2 grade  to improve strength for functional tasks  4. Pt improve impaired knee extension ROM by 5 deg to  improve mobility for normal movement patterns.   Long Term Goals (8 Weeks):  1. Pt will improve FOTO score to </= TBD% limited to decrease perceived limitation with mobility  2. Pt will demonstrate improved single leg loading without pain limitation to facilitate return to exercise classes.   3. Pt will improve impaired LE MMTs by 1 grade to improve strength for functional tasks.  4. Pt improve impaired knee ROM to WNL in all planes to improve mobility for normal movement patterns.   5. Pt will report pain of </=1/10 with ADLs to improve QOL and tolerance to activity.   Plan      Plan of care Certification: 3/18/2024 to 6/18/24.     Outpatient Physical Therapy 3 times weekly for 12 weeks to include the following interventions: Cervical/Lumbar Traction, Electrical Stimulation , Gait Training, Manual Therapy, Moist Heat/ Ice, Neuromuscular Re-ed, Patient Education, Self Care, Therapeutic Activities, Therapeutic Exercise, and Ultrasound.     Mian Jade, PT

## 2024-05-15 ENCOUNTER — CLINICAL SUPPORT (OUTPATIENT)
Dept: REHABILITATION | Facility: HOSPITAL | Age: 69
End: 2024-05-15
Payer: COMMERCIAL

## 2024-05-15 DIAGNOSIS — R29.898 WEAKNESS OF BOTH HIPS: Primary | ICD-10-CM

## 2024-05-15 DIAGNOSIS — M25.661 DECREASED RANGE OF MOTION OF BOTH KNEES: ICD-10-CM

## 2024-05-15 DIAGNOSIS — M25.662 DECREASED RANGE OF MOTION OF BOTH KNEES: ICD-10-CM

## 2024-05-15 PROCEDURE — 97112 NEUROMUSCULAR REEDUCATION: CPT

## 2024-05-15 PROCEDURE — 97530 THERAPEUTIC ACTIVITIES: CPT

## 2024-05-15 NOTE — PROGRESS NOTES
[facility]   Physical Therapy Treatment Note      Name: Dasia Henning  Clinic Number: [id number]    Therapy Diagnosis:   Encounter Diagnoses   Name Primary?    Weakness of both hips Yes    Decreased range of motion of both knees      Physician: Williams, Claude S. IV,*    Visit Date: 5/15/2024  Physician Orders: PT Eval and Treat   Medical Diagnosis from Referral:   M25.562 (ICD-10-CM) - Left knee pain   Evaluation Date: 3/18/2024  Authorization Period Expiration: 12/31/24  Plan of Care Expiration: 6/18/24  Progress Note Due: 4/18/24 ** Next Visit   Visit # / Visits authorized: 1/ 1 13/20  FOTO: 5/5 5/5 ** Next Visit      Precautions: Standard      Surgery: Knee Arthroscopy Performed by Claude Williams 10/27/23  Days Post-Op: 23 Weeks and 3 Days (4/8/24)**    PTA Visit #: 0/5     Time In: 7:03 am   Time Out: 8:02 am   Total Billable Time: 61 minutes      Subjective     Pt reports: That she has been very happy with how she is feeling and really feels like she has been able to do more without as much pain and has been getting her leg straighter.  She feels like she is finally on the right path.      She was compliant with home exercise program.  Response to previous treatment: Initial aching   Functional change: No change     Pain: 4/10  Location:  Left Lateral Knee Around Patella and Popliteal Space      Objective      Objective Measures updated at progress report unless specified.     Reassessment Next Visit     Treatment     Dasia received the treatments listed below:      therapeutic exercises to develop strength, endurance, ROM, posture, and core stabilization for 0 minutes including:    manual therapy techniques: The techniques below were applied for 0 minutes:     Patellar Mobilizations 4 Ways Grade II-III  Infrapatellar Fat Pad Mobilizations   Tibiofemoral Hinge Mobilizations for Extension Grade II-III    Not Today:   Tib/Fib Mobilizations P/A IV  Medial and Lateral Gapping of the  Knee Grade III  Anterior Hip Mobilizations Grade III-IV  Lumbar distraction, gross IV    neuromuscular re-education activities to improve:  for 19 minutes. The following activities were included:    Seated Quadriceps Sit to Stand Sciatic Nerve Glide 1 x 40     Upright Quad Sets + SLR 1# on 1/2 Foam Roll 2 x 12  Bridges Cue for Glut Activation GTB 3 x 8 3 Sec Holds   Clamshells Level 1 2 x 10 10 Sec Holds      Not Today:   Seated TKE 2 x 10  Standing TKE GTB 2 x 12 3 Sec   Closed Chain TheraBall TKE GTB 2 x 15   Prone Knee Extended Hip Extension 2 x 12 2 Sec Hold   Standing Heel Raises 2 x 12 3 Sec Holds   Short Arc Quad over Foam Roll 2 x 15 2 Sec Hold     therapeutic activities to improve functional performance for 42 minutes, including:    Upright Bike to facilitate active range of motion, endurance, and strengthening through reciprocal range of motion   Hip Hinging Progression      - Seated Hip Hinges with Dowel 1 x 18      - Standing Hip Hinges with Dowel 1 x 18      - Sit to Stand Hip Hinges with Dowel 1 x 18   Shuttle Double Leg Press 3 x 10 2  Black Bands GTB Around Knees   Shuttle Squat Heel Raise 2 Black Bands 2 x 12   Bent Knee Shuttle Kick Backs 1 Red Band 2 x 12   Monster Walks 3 Turf Laps GTB     hot pack for 0 minutes to .    cold pack for 0 minutes to .    Patient Education and Home Exercises       Education provided:   - HEP  - Plan of Care   - Relation of Proximal and Distal Joints to the Knee   - Anatomy and Physiology of the Knee Joint   - Patellar Tracking and Related Tissues     Written Home Exercises Provided: yes. Exercises were reviewed and Dasia was able to demonstrate them prior to the end of the session.  Dasia demonstrated good  understanding of the education provided. See EMR under Patient Instructions for exercises provided during therapy sessions    Assessment     Dasia presents to physical therapy with positive subjective reports and demonstrated improved tolerance and strength of the  quadriceps with straight leg raises through end range of hip flexion for shortened lever arm and showed good ability to raise knee without quad lags.  She showed increased willingness to activate her quadriceps in terminal knee extension and was able to extend without sharp increases of pain.  Knee extension range may be reaching her maximal range, but she continues to build load tolerance, strength, and force into her terminal knee extension.  She continued to work to improve glut strengthening and activation to offload both the low back and the knee and included isometric holds for endurance and stability training of the gluts.  She continued to work on hip hinging movement patterns to facilitate glut loading and decreased shearing through the patellofemoral joint.  She showed best performance of hip hinging and sit to stands today and will continue to work to refine loading and activation patterns through all exercises and interventions.      Dasia Is progressing well towards her goals.   Pt prognosis is Good.     Pt will continue to benefit from skilled outpatient physical therapy to address the deficits listed in the problem list box on initial evaluation, provide pt/family education and to maximize pt's level of independence in the home and community environment.     Pt's spiritual, cultural and educational needs considered and pt agreeable to plan of care and goals.     Anticipated barriers to physical therapy: None at this time     Goals:   Short Term Goals (4 Weeks):   1. Pt will be compliant with HEP to supplement PT in restoring pain free function.  2. Pt will squat form to improve posterior chain loading and glut activation and decrease anterior knee loading.   3. Pt will improve impaired LE MMTs by 1/2 grade  to improve strength for functional tasks  4. Pt improve impaired knee extension ROM by 5 deg to improve mobility for normal movement patterns.   Long Term Goals (8 Weeks):  1. Pt will improve FOTO  score to </= TBD% limited to decrease perceived limitation with mobility  2. Pt will demonstrate improved single leg loading without pain limitation to facilitate return to exercise classes.   3. Pt will improve impaired LE MMTs by 1 grade to improve strength for functional tasks.  4. Pt improve impaired knee ROM to WNL in all planes to improve mobility for normal movement patterns.   5. Pt will report pain of </=1/10 with ADLs to improve QOL and tolerance to activity.   Plan      Plan of care Certification: 3/18/2024 to 6/18/24.     Outpatient Physical Therapy 3 times weekly for 12 weeks to include the following interventions: Cervical/Lumbar Traction, Electrical Stimulation , Gait Training, Manual Therapy, Moist Heat/ Ice, Neuromuscular Re-ed, Patient Education, Self Care, Therapeutic Activities, Therapeutic Exercise, and Ultrasound.     Mian Jade, PT

## 2024-05-24 ENCOUNTER — CLINICAL SUPPORT (OUTPATIENT)
Dept: REHABILITATION | Facility: HOSPITAL | Age: 69
End: 2024-05-24
Payer: COMMERCIAL

## 2024-05-24 DIAGNOSIS — M25.662 DECREASED RANGE OF MOTION OF BOTH KNEES: ICD-10-CM

## 2024-05-24 DIAGNOSIS — M25.661 DECREASED RANGE OF MOTION OF BOTH KNEES: ICD-10-CM

## 2024-05-24 DIAGNOSIS — R29.898 WEAKNESS OF BOTH HIPS: Primary | ICD-10-CM

## 2024-05-24 PROCEDURE — 97112 NEUROMUSCULAR REEDUCATION: CPT | Performed by: PHYSICAL THERAPIST

## 2024-05-24 PROCEDURE — 97530 THERAPEUTIC ACTIVITIES: CPT | Performed by: PHYSICAL THERAPIST

## 2024-05-24 NOTE — PROGRESS NOTES
"[facility]   Physical Therapy Treatment Note      Name: Dasia Henning  Clinic Number: [id number]    Therapy Diagnosis:   Encounter Diagnoses   Name Primary?    Weakness of both hips Yes    Decreased range of motion of both knees      Physician: Williams, Claude S. IV,*    Visit Date: 5/24/2024  Physician Orders: PT Eval and Treat   Medical Diagnosis from Referral:   M25.562 (ICD-10-CM) - Left knee pain   Evaluation Date: 3/18/2024  Authorization Period Expiration: 12/31/24  Plan of Care Expiration: 6/18/24  Progress Note Due: 4/18/24 ** Next Visit   Visit # / Visits authorized: 1/ 1 14/20  FOTO: 5/5 5/5 ** Next Visit      Precautions: Standard      Surgery: Knee Arthroscopy Performed by Claude Williams 10/27/23  Days Post-Op: 23 Weeks and 3 Days (4/8/24)**    PTA Visit #: 0/5     Time In: 7:04 am   Time Out: 8:02 am   Total Billable Time: 50 minutes      Subjective     Pt reports: Feels very encouraged that she is turning the corner. Has been working with her  on strength and conditioning,     She was compliant with home exercise program.  Response to previous treatment: Initial aching   Functional change: No change     Pain: 4/10  Location:  Left Lateral Knee Around Patella and Popliteal Space      Objective      Objective Measures updated at progress report unless specified.     Reassessment Next Visit     Treatment     Dasia received the treatments listed below:      therapeutic exercises to develop strength, endurance, ROM, posture, and core stabilization for 03 minutes including:  Seated HS stretch 30" x 3 BLE    manual therapy techniques: The techniques below were applied for 0 minutes:     Patellar Mobilizations 4 Ways Grade II-III  Infrapatellar Fat Pad Mobilizations   Tibiofemoral Hinge Mobilizations for Extension Grade II-III    Not Today:   Tib/Fib Mobilizations P/A IV  Medial and Lateral Gapping of the Knee Grade III  Anterior Hip Mobilizations Grade III-IV  Lumbar " distraction, gross IV    neuromuscular re-education activities to improve:  for 25 minutes. The following activities were included:    Seated quad set to SLR 20x ea  Bridges Cue for Glut Activation GTB 3 x 8 3 Sec Holds   Clamshells Level 1 2 x 10 10 Sec Holds      Not Today:   Seated TKE 2 x 10  Standing TKE GTB 2 x 12 3 Sec   Closed Chain TheraBall TKE GTB 2 x 15   Prone Knee Extended Hip Extension 2 x 12 2 Sec Hold   Standing Heel Raises 2 x 12 3 Sec Holds   Short Arc Quad over Foam Roll 2 x 15 2 Sec Hold     therapeutic activities to improve functional performance for 25 minutes, including:      Hip Hinging Progression      - Seated Hip Hinges with Dowel 1 x 18      - Standing Hip Hinges with Dowel 1 x 18      - Sit to Stand Hip Hinges with Dowel 1 x 18   RDL with 10 lb KB 10x (poor technique)        Patient Education and Home Exercises       Education provided:   - HEP  - Plan of Care   - Relation of Proximal and Distal Joints to the Knee   - Anatomy and Physiology of the Knee Joint   - Patellar Tracking and Related Tissues     Written Home Exercises Provided: yes. Exercises were reviewed and Dasia was able to demonstrate them prior to the end of the session.  Dasia demonstrated good  understanding of the education provided. See EMR under Patient Instructions for exercises provided during therapy sessions    Assessment   Shows improved hip strength and hip to low back disassociation. Very encouraging. Overall is progressing well. Needs cuing to maintain RDL form without stick. Continue to progress functional movement patterns.        Dasia Is progressing well towards her goals.   Pt prognosis is Good.     Pt will continue to benefit from skilled outpatient physical therapy to address the deficits listed in the problem list box on initial evaluation, provide pt/family education and to maximize pt's level of independence in the home and community environment.     Pt's spiritual, cultural and educational needs  considered and pt agreeable to plan of care and goals.     Anticipated barriers to physical therapy: None at this time     Goals:   Short Term Goals (4 Weeks):   1. Pt will be compliant with HEP to supplement PT in restoring pain free function.  2. Pt will squat form to improve posterior chain loading and glut activation and decrease anterior knee loading.   3. Pt will improve impaired LE MMTs by 1/2 grade  to improve strength for functional tasks  4. Pt improve impaired knee extension ROM by 5 deg to improve mobility for normal movement patterns.   Long Term Goals (8 Weeks):  1. Pt will improve FOTO score to </= TBD% limited to decrease perceived limitation with mobility  2. Pt will demonstrate improved single leg loading without pain limitation to facilitate return to exercise classes.   3. Pt will improve impaired LE MMTs by 1 grade to improve strength for functional tasks.  4. Pt improve impaired knee ROM to WNL in all planes to improve mobility for normal movement patterns.   5. Pt will report pain of </=1/10 with ADLs to improve QOL and tolerance to activity.   Plan      Plan of care Certification: 3/18/2024 to 6/18/24.     Outpatient Physical Therapy 3 times weekly for 12 weeks to include the following interventions: Cervical/Lumbar Traction, Electrical Stimulation , Gait Training, Manual Therapy, Moist Heat/ Ice, Neuromuscular Re-ed, Patient Education, Self Care, Therapeutic Activities, Therapeutic Exercise, and Ultrasound.     Tim Lynn, PT

## 2024-05-31 ENCOUNTER — CLINICAL SUPPORT (OUTPATIENT)
Dept: REHABILITATION | Facility: HOSPITAL | Age: 69
End: 2024-05-31
Payer: COMMERCIAL

## 2024-05-31 DIAGNOSIS — M25.662 DECREASED RANGE OF MOTION OF BOTH KNEES: ICD-10-CM

## 2024-05-31 DIAGNOSIS — R29.898 WEAKNESS OF BOTH HIPS: Primary | ICD-10-CM

## 2024-05-31 DIAGNOSIS — M25.661 DECREASED RANGE OF MOTION OF BOTH KNEES: ICD-10-CM

## 2024-05-31 PROCEDURE — 97112 NEUROMUSCULAR REEDUCATION: CPT | Performed by: PHYSICAL THERAPIST

## 2024-05-31 PROCEDURE — 97530 THERAPEUTIC ACTIVITIES: CPT | Performed by: PHYSICAL THERAPIST

## 2024-05-31 PROCEDURE — 97110 THERAPEUTIC EXERCISES: CPT | Performed by: PHYSICAL THERAPIST

## 2024-05-31 NOTE — PROGRESS NOTES
"[facility]   Physical Therapy Treatment Note      Name: Dasia Henning  Clinic Number: [id number]    Therapy Diagnosis:   Encounter Diagnoses   Name Primary?    Weakness of both hips Yes    Decreased range of motion of both knees      Physician: Williams, Claude S. IV,*    Visit Date: 5/31/2024  Physician Orders: PT Eval and Treat   Medical Diagnosis from Referral:   M25.562 (ICD-10-CM) - Left knee pain   Evaluation Date: 3/18/2024  Authorization Period Expiration: 12/31/24  Plan of Care Expiration: 6/18/24  Progress Note Due: 4/18/24 ** Next Visit   Visit # / Visits authorized: 1/ 1 15/20  FOTO: 5/5 5/5 ** Next Visit      Precautions: Standard      Surgery: Knee Arthroscopy Performed by Claude Williams 10/27/23  Days Post-Op: 23 Weeks and 3 Days (4/8/24)**    PTA Visit #: 0/5     Time In: 7:03 am   Time Out: 0805 am   Total Billable Time: 50 minutes      Subjective     Pt reports: Did a lot of walking multiple days in a row over the weekend and had increased knee pain. Rested knee at gym this week but it is feeling a little bit better.    She was compliant with home exercise program.  Response to previous treatment: Initial aching   Functional change: No change     Pain: 4/10  Location:  Left Lateral Knee Around Patella and Popliteal Space      Objective      Objective Measures updated at progress report unless specified.     Reassessment Next Visit     Treatment     Dasia received the treatments listed below:      therapeutic exercises to develop strength, endurance, ROM, posture, and core stabilization for 010 minutes including:  Stationary cycle 8 minutes  Seated HS stretch 30" x 3 BLE    manual therapy techniques: The techniques below were applied for 0 minutes:     Patellar Mobilizations 4 Ways Grade II-III  Infrapatellar Fat Pad Mobilizations   Tibiofemoral Hinge Mobilizations for Extension Grade II-III    Not Today:   Tib/Fib Mobilizations P/A IV  Medial and Lateral Gapping of the " Knee Grade III  Anterior Hip Mobilizations Grade III-IV  Lumbar distraction, gross IV    neuromuscular re-education activities to improve:  for 15 minutes. The following activities were included:    Seated quad set to SLR 20x ea  Bridges Cue for Glut Activation GTB 3 x 8 3 Sec Holds   Clamshells Level 1 2 x 10 10 Sec Holds , YTB     Not Today:   Seated TKE 2 x 10  Standing TKE GTB 2 x 12 3 Sec   Closed Chain TheraBall TKE GTB 2 x 15   Prone Knee Extended Hip Extension 2 x 12 2 Sec Hold   Standing Heel Raises 2 x 12 3 Sec Holds   Short Arc Quad over Foam Roll 2 x 15 2 Sec Hold     therapeutic activities to improve functional performance for 25 minutes, including:      Hip Hinging Progression      - Seated Hip Hinges with Dowel 1 x 18      - Standing Hip Hinges with Dowel 1 x 18      -  RDL with 25 lbs Kbs 4 x 5  Pallof press YTB 3 x 10      Patient Education and Home Exercises       Education provided:   - HEP  - Plan of Care   - Relation of Proximal and Distal Joints to the Knee   - Anatomy and Physiology of the Knee Joint   - Patellar Tracking and Related Tissues     Written Home Exercises Provided: yes. Exercises were reviewed and Dasia was able to demonstrate them prior to the end of the session.  Dasia demonstrated good  understanding of the education provided. See EMR under Patient Instructions for exercises provided during therapy sessions    Assessment   Had some knee pain today but functional movement patterns continue to improve. Specifically encouraged by RDL technique. Continue with current focus.       Dasia Is progressing well towards her goals.   Pt prognosis is Good.     Pt will continue to benefit from skilled outpatient physical therapy to address the deficits listed in the problem list box on initial evaluation, provide pt/family education and to maximize pt's level of independence in the home and community environment.     Pt's spiritual, cultural and educational needs considered and pt agreeable  to plan of care and goals.     Anticipated barriers to physical therapy: None at this time     Goals:   Short Term Goals (4 Weeks):   1. Pt will be compliant with HEP to supplement PT in restoring pain free function.  2. Pt will squat form to improve posterior chain loading and glut activation and decrease anterior knee loading.   3. Pt will improve impaired LE MMTs by 1/2 grade  to improve strength for functional tasks  4. Pt improve impaired knee extension ROM by 5 deg to improve mobility for normal movement patterns.   Long Term Goals (8 Weeks):  1. Pt will improve FOTO score to </= TBD% limited to decrease perceived limitation with mobility  2. Pt will demonstrate improved single leg loading without pain limitation to facilitate return to exercise classes.   3. Pt will improve impaired LE MMTs by 1 grade to improve strength for functional tasks.  4. Pt improve impaired knee ROM to WNL in all planes to improve mobility for normal movement patterns.   5. Pt will report pain of </=1/10 with ADLs to improve QOL and tolerance to activity.   Plan      Plan of care Certification: 3/18/2024 to 6/18/24.     Outpatient Physical Therapy 3 times weekly for 12 weeks to include the following interventions: Cervical/Lumbar Traction, Electrical Stimulation , Gait Training, Manual Therapy, Moist Heat/ Ice, Neuromuscular Re-ed, Patient Education, Self Care, Therapeutic Activities, Therapeutic Exercise, and Ultrasound.     Tim Lynn, PT

## 2024-06-07 ENCOUNTER — CLINICAL SUPPORT (OUTPATIENT)
Dept: REHABILITATION | Facility: HOSPITAL | Age: 69
End: 2024-06-07
Payer: COMMERCIAL

## 2024-06-07 DIAGNOSIS — M25.662 DECREASED RANGE OF MOTION OF BOTH KNEES: ICD-10-CM

## 2024-06-07 DIAGNOSIS — R29.898 WEAKNESS OF BOTH HIPS: Primary | ICD-10-CM

## 2024-06-07 DIAGNOSIS — M25.661 DECREASED RANGE OF MOTION OF BOTH KNEES: ICD-10-CM

## 2024-06-07 PROCEDURE — 97110 THERAPEUTIC EXERCISES: CPT | Performed by: PHYSICAL THERAPIST

## 2024-06-07 PROCEDURE — 97530 THERAPEUTIC ACTIVITIES: CPT | Performed by: PHYSICAL THERAPIST

## 2024-06-07 PROCEDURE — 97112 NEUROMUSCULAR REEDUCATION: CPT | Performed by: PHYSICAL THERAPIST

## 2024-06-10 ENCOUNTER — CLINICAL SUPPORT (OUTPATIENT)
Dept: REHABILITATION | Facility: HOSPITAL | Age: 69
End: 2024-06-10
Payer: COMMERCIAL

## 2024-06-10 DIAGNOSIS — R29.898 WEAKNESS OF BOTH HIPS: Primary | ICD-10-CM

## 2024-06-10 DIAGNOSIS — M25.661 DECREASED RANGE OF MOTION OF BOTH KNEES: ICD-10-CM

## 2024-06-10 DIAGNOSIS — M25.662 DECREASED RANGE OF MOTION OF BOTH KNEES: ICD-10-CM

## 2024-06-10 PROCEDURE — 97110 THERAPEUTIC EXERCISES: CPT | Performed by: PHYSICAL THERAPIST

## 2024-06-10 PROCEDURE — 97112 NEUROMUSCULAR REEDUCATION: CPT | Performed by: PHYSICAL THERAPIST

## 2024-06-10 PROCEDURE — 97530 THERAPEUTIC ACTIVITIES: CPT | Performed by: PHYSICAL THERAPIST

## 2024-06-10 NOTE — PROGRESS NOTES
"[facility]   Physical Therapy Treatment Note      Name: Dasia Henning  Clinic Number: [id number]    Therapy Diagnosis:   Encounter Diagnoses   Name Primary?    Weakness of both hips Yes    Decreased range of motion of both knees      Physician: Williams, Claude S. IV,*    Visit Date: 6/7/2024  Physician Orders: PT Eval and Treat   Medical Diagnosis from Referral:   M25.562 (ICD-10-CM) - Left knee pain   Evaluation Date: 3/18/2024  Authorization Period Expiration: 12/31/24  Plan of Care Expiration: 6/18/24  Progress Note Due: 4/18/24 ** Next Visit   Visit # / Visits authorized: 1/ 1 15/20  FOTO: 5/5 5/5 ** Next Visit      Precautions: Standard      Surgery: Knee Arthroscopy Performed by Claude Williams 10/27/23  Days Post-Op: 23 Weeks and 3 Days (4/8/24)**    PTA Visit #: 0/5     Time In: 7:05 am   Time Out: 0805 am   Total Billable Time: 50 minutes      Subjective     Pt reports: Is progressing well with her strengthening. Having good days and bad days but overall is progressing.     She was compliant with home exercise program.  Response to previous treatment: Initial aching   Functional change: No change     Pain: 4/10  Location:  Left Lateral Knee Around Patella and Popliteal Space      Objective      Objective Measures updated at progress report unless specified.     Reassessment Next Visit     Treatment     Dasia received the treatments listed below:      therapeutic exercises to develop strength, endurance, ROM, posture, and core stabilization for 010 minutes including:  Stationary cycle 8 minutes  Seated HS stretch 30" x 3 BLE    manual therapy techniques: The techniques below were applied for 0 minutes:     Patellar Mobilizations 4 Ways Grade II-III  Infrapatellar Fat Pad Mobilizations   Tibiofemoral Hinge Mobilizations for Extension Grade II-III    Not Today:   Tib/Fib Mobilizations P/A IV  Medial and Lateral Gapping of the Knee Grade III  Anterior Hip Mobilizations Grade " III-IV  Lumbar distraction, gross IV    neuromuscular re-education activities to improve:  for 15 minutes. The following activities were included:    Bridges Cue for Glut Activation GTB 3 x 8 3 Sec Holds   Clamshells Level 1 2 x 10 10 Sec Holds , YTB     Not Today:   Seated TKE 2 x 10  Standing TKE GTB 2 x 12 3 Sec   Closed Chain TheraBall TKE GTB 2 x 15   Prone Knee Extended Hip Extension 2 x 12 2 Sec Hold   Standing Heel Raises 2 x 12 3 Sec Holds   Short Arc Quad over Foam Roll 2 x 15 2 Sec Hold     therapeutic activities to improve functional performance for 25 minutes, including:  Standing TKE with RTB.     Hip Hinging Progression      - Seated Hip Hinges with Dowel 1 x 18      - Standing Hip Hinges with Dowel 1 x 18      -  RDL with 25 lbs Kbs 4 x 5  Pallof press YTB 3 x 10 - NP      Patient Education and Home Exercises       Education provided:   - HEP  - Plan of Care   - Relation of Proximal and Distal Joints to the Knee   - Anatomy and Physiology of the Knee Joint   - Patellar Tracking and Related Tissues     Written Home Exercises Provided: yes. Exercises were reviewed and Dasia was able to demonstrate them prior to the end of the session.  Dasia demonstrated good  understanding of the education provided. See EMR under Patient Instructions for exercises provided during therapy sessions    Assessment   Squat and RDL technique are both improving quickly which is very encouraging. Continues to require posterior chain strength but moving chanell  positive direction. Add in step up progression and anti rotation work.      Dasia Is progressing well towards her goals.   Pt prognosis is Good.     Pt will continue to benefit from skilled outpatient physical therapy to address the deficits listed in the problem list box on initial evaluation, provide pt/family education and to maximize pt's level of independence in the home and community environment.     Pt's spiritual, cultural and educational needs considered and pt  agreeable to plan of care and goals.     Anticipated barriers to physical therapy: None at this time     Goals:   Short Term Goals (4 Weeks):   1. Pt will be compliant with HEP to supplement PT in restoring pain free function.  2. Pt will squat form to improve posterior chain loading and glut activation and decrease anterior knee loading.   3. Pt will improve impaired LE MMTs by 1/2 grade  to improve strength for functional tasks  4. Pt improve impaired knee extension ROM by 5 deg to improve mobility for normal movement patterns.   Long Term Goals (8 Weeks):  1. Pt will improve FOTO score to </= TBD% limited to decrease perceived limitation with mobility  2. Pt will demonstrate improved single leg loading without pain limitation to facilitate return to exercise classes.   3. Pt will improve impaired LE MMTs by 1 grade to improve strength for functional tasks.  4. Pt improve impaired knee ROM to WNL in all planes to improve mobility for normal movement patterns.   5. Pt will report pain of </=1/10 with ADLs to improve QOL and tolerance to activity.   Plan      Plan of care Certification: 3/18/2024 to 6/18/24.     Outpatient Physical Therapy 3 times weekly for 12 weeks to include the following interventions: Cervical/Lumbar Traction, Electrical Stimulation , Gait Training, Manual Therapy, Moist Heat/ Ice, Neuromuscular Re-ed, Patient Education, Self Care, Therapeutic Activities, Therapeutic Exercise, and Ultrasound.     Tim Lynn, PT

## 2024-06-10 NOTE — PROGRESS NOTES
"[facility]   Physical Therapy Treatment Note      Name: Dasia Henning  Clinic Number: [id number]    Therapy Diagnosis:   Encounter Diagnoses   Name Primary?    Weakness of both hips Yes    Decreased range of motion of both knees      Physician: Williams, Claude S. IV,*    Visit Date: 6/10/2024  Physician Orders: PT Eval and Treat   Medical Diagnosis from Referral:   M25.562 (ICD-10-CM) - Left knee pain   Evaluation Date: 3/18/2024  Authorization Period Expiration: 12/31/24  Plan of Care Expiration: 6/18/24  Progress Note Due: 4/18/24 ** Next Visit   Visit # / Visits authorized: 1/ 1 17/20  FOTO: COMPLETE  Precautions: Standard      Surgery: Knee Arthroscopy Performed by Claude Williams 10/27/23  Days Post-Op: 23 Weeks and 3 Days (4/8/24)**    PTA Visit #: 0/5     Time In: 7:02 am   Time Out: 0800 am   Total Billable Time: 50 minutes      Subjective     Pt reports: Feeling good today. Was able to complete the rower at home which is encouraging.     She was compliant with home exercise program.  Response to previous treatment: Initial aching   Functional change: No change     Pain: 4/10  Location:  Left Lateral Knee Around Patella and Popliteal Space      Objective      Objective Measures updated at progress report unless specified.     Reassessment Next Visit     Treatment     Dasia received the treatments listed below:      therapeutic exercises to develop strength, endurance, ROM, posture, and core stabilization for 010 minutes including:  Stationary cycle 8 minutes lvl 5  Seated HS stretch 30" x 3 BLE    manual therapy techniques: The techniques below were applied for 0 minutes:     neuromuscular re-education activities to improve:  for 15 minutes. The following activities were included:  Bridges Cue for Glut Activation GTB 3 x 8 3 Sec Holds   Clamshells Level 1 2 x 10 10 Sec Holds , GTB    therapeutic activities to improve functional performance for 25 minutes, including:  Standing TKE " "with RTB.   Standing hip hinge 20x  Step up to SLS 3" 30x ea  DL 3d strap squat series 20x ea. 10 lbs     NP:  RDL with 25 lbs Kbs 4 x 5  Pallof press YTB 3 x 10 - NP      Patient Education and Home Exercises       Education provided:   - HEP  - Plan of Care   - Relation of Proximal and Distal Joints to the Knee   - Anatomy and Physiology of the Knee Joint   - Patellar Tracking and Related Tissues     Written Home Exercises Provided: yes. Exercises were reviewed and Dasia was able to demonstrate them prior to the end of the session.  Dasia demonstrated good  understanding of the education provided. See EMR under Patient Instructions for exercises provided during therapy sessions    Assessment   Very challenged by single limb stability. Will continue to progress in this regards. Progress as able.       Dasia Is progressing well towards her goals.   Pt prognosis is Good.     Pt will continue to benefit from skilled outpatient physical therapy to address the deficits listed in the problem list box on initial evaluation, provide pt/family education and to maximize pt's level of independence in the home and community environment.     Pt's spiritual, cultural and educational needs considered and pt agreeable to plan of care and goals.     Anticipated barriers to physical therapy: None at this time     Goals:   Short Term Goals (4 Weeks):   1. Pt will be compliant with HEP to supplement PT in restoring pain free function.  2. Pt will squat form to improve posterior chain loading and glut activation and decrease anterior knee loading.   3. Pt will improve impaired LE MMTs by 1/2 grade  to improve strength for functional tasks  4. Pt improve impaired knee extension ROM by 5 deg to improve mobility for normal movement patterns.   Long Term Goals (8 Weeks):  1. Pt will improve FOTO score to </= TBD% limited to decrease perceived limitation with mobility  2. Pt will demonstrate improved single leg loading without pain " limitation to facilitate return to exercise classes.   3. Pt will improve impaired LE MMTs by 1 grade to improve strength for functional tasks.  4. Pt improve impaired knee ROM to WNL in all planes to improve mobility for normal movement patterns.   5. Pt will report pain of </=1/10 with ADLs to improve QOL and tolerance to activity.   Plan      Plan of care Certification: 3/18/2024 to 6/18/24.     Outpatient Physical Therapy 3 times weekly for 12 weeks to include the following interventions: Cervical/Lumbar Traction, Electrical Stimulation , Gait Training, Manual Therapy, Moist Heat/ Ice, Neuromuscular Re-ed, Patient Education, Self Care, Therapeutic Activities, Therapeutic Exercise, and Ultrasound.     Tim Lynn, PT

## 2024-06-19 ENCOUNTER — CLINICAL SUPPORT (OUTPATIENT)
Dept: REHABILITATION | Facility: HOSPITAL | Age: 69
End: 2024-06-19
Payer: COMMERCIAL

## 2024-06-19 DIAGNOSIS — M25.661 DECREASED RANGE OF MOTION OF BOTH KNEES: ICD-10-CM

## 2024-06-19 DIAGNOSIS — M25.662 DECREASED RANGE OF MOTION OF BOTH KNEES: ICD-10-CM

## 2024-06-19 DIAGNOSIS — R29.898 WEAKNESS OF BOTH HIPS: Primary | ICD-10-CM

## 2024-06-19 PROCEDURE — 97530 THERAPEUTIC ACTIVITIES: CPT | Performed by: PHYSICAL THERAPIST

## 2024-06-19 PROCEDURE — 97140 MANUAL THERAPY 1/> REGIONS: CPT | Performed by: PHYSICAL THERAPIST

## 2024-06-19 PROCEDURE — 97112 NEUROMUSCULAR REEDUCATION: CPT | Performed by: PHYSICAL THERAPIST

## 2024-06-19 PROCEDURE — 97110 THERAPEUTIC EXERCISES: CPT | Performed by: PHYSICAL THERAPIST

## 2024-06-19 NOTE — PROGRESS NOTES
"[facility]   Physical Therapy Treatment Note      Name: Dasia Henning  Clinic Number: [id number]    Therapy Diagnosis:   Encounter Diagnoses   Name Primary?    Weakness of both hips Yes    Decreased range of motion of both knees      Physician: Williams, Claude S. IV,*    Visit Date: 6/19/2024  Physician Orders: PT Eval and Treat   Medical Diagnosis from Referral:   M25.562 (ICD-10-CM) - Left knee pain   Evaluation Date: 3/18/2024  Authorization Period Expiration: 12/31/24  Plan of Care Expiration: 10/1/2024  Progress Note Due: 10/1/2024  Visit # / Visits authorized: 1/ 1 17/20  FOTO: COMPLETE  Precautions: Standard      Surgery: Knee Arthroscopy Performed by Claude Williams 10/27/23  Days Post-Op: 23 Weeks and 3 Days (4/8/24)**    PTA Visit #: 0/5     Time In: 7:00 am   Time Out: 0757 am   Total Billable Time: 36 minutes      Subjective     Pt reports: Caught COVID last week and was sick. Unable to exercise for a week and now her knee is hurting and stiff. Would like to focus on improving this.     She was compliant with home exercise program.  Response to previous treatment: Initial aching   Functional change: No change     Pain: 4/10  Location:  Left Lateral Knee Around Patella and Popliteal Space      Objective      Objective Measures updated at progress report unless specified.     Reassessment Next Visit     Treatment     Dasia received the treatments listed below:      therapeutic exercises to develop strength, endurance, ROM, posture, and core stabilization for 010 minutes including:  Stationary cycle 8 minutes lvl 5  Seated HS stretch 30" x 3 BLE    manual therapy techniques: The techniques below were applied for 08 minutes:   Patellar mobilizations all planes  Medial gapping III    neuromuscular re-education activities to improve:  for 8 minutes. The following activities were included:  Bridges Cue for Glut Activation GTB 3 x 8 3 Sec Holds - NP  Clamshells Level 1 2 x 10 10 Sec " "Holds , GTB    therapeutic activities to improve functional performance for 10 minutes, including:  Standing TKE with RTB.   Standing hip hinge 20x     NP:  RDL with 25 lbs Kbs 4 x 5  Pallof press YTB 3 x 10 - NP  Step up to SLS 3" 30x ea  DL 3d strap squat series 20x ea. 10 lbs    Patient Education and Home Exercises       Education provided:   - HEP  - Plan of Care   - Relation of Proximal and Distal Joints to the Knee   - Anatomy and Physiology of the Knee Joint   - Patellar Tracking and Related Tissues     Written Home Exercises Provided: yes. Exercises were reviewed and Dasia was able to demonstrate them prior to the end of the session.  Dasia demonstrated good  understanding of the education provided. See EMR under Patient Instructions for exercises provided during therapy sessions    Assessment   Kept session light today as she is still recovering from illness. Did well with no loss of knee ROM. Improved with recumbent cycle for motion. Resume standard treatment and assess foot intrinsic strength.      Dasia Is progressing well towards her goals.   Pt prognosis is Good.     Pt will continue to benefit from skilled outpatient physical therapy to address the deficits listed in the problem list box on initial evaluation, provide pt/family education and to maximize pt's level of independence in the home and community environment.     Pt's spiritual, cultural and educational needs considered and pt agreeable to plan of care and goals.     Anticipated barriers to physical therapy: None at this time     Goals:   Short Term Goals (4 Weeks):   1. Pt will be compliant with HEP to supplement PT in restoring pain free function.  2. Pt will squat form to improve posterior chain loading and glut activation and decrease anterior knee loading.   3. Pt will improve impaired LE MMTs by 1/2 grade  to improve strength for functional tasks  4. Pt improve impaired knee extension ROM by 5 deg to improve mobility for normal movement " patterns.   Long Term Goals (8 Weeks):  1. Pt will improve FOTO score to </= TBD% limited to decrease perceived limitation with mobility  2. Pt will demonstrate improved single leg loading without pain limitation to facilitate return to exercise classes.   3. Pt will improve impaired LE MMTs by 1 grade to improve strength for functional tasks.  4. Pt improve impaired knee ROM to WNL in all planes to improve mobility for normal movement patterns.   5. Pt will report pain of </=1/10 with ADLs to improve QOL and tolerance to activity.   Plan      Plan of care Certification: 3/18/2024 to 10/1/2024.     Outpatient Physical Therapy 3 times weekly for 12 weeks to include the following interventions: Cervical/Lumbar Traction, Electrical Stimulation , Gait Training, Manual Therapy, Moist Heat/ Ice, Neuromuscular Re-ed, Patient Education, Self Care, Therapeutic Activities, Therapeutic Exercise, and Ultrasound.     Tim Lynn, PT

## 2024-06-19 NOTE — PLAN OF CARE
"[facility]   Physical Therapy Treatment Note      Name: Dasia Henning  Clinic Number: [id number]    Therapy Diagnosis:   Encounter Diagnoses   Name Primary?    Weakness of both hips Yes    Decreased range of motion of both knees      Physician: Williams, Claude S. IV,*    Visit Date: 6/19/2024  Physician Orders: PT Eval and Treat   Medical Diagnosis from Referral:   M25.562 (ICD-10-CM) - Left knee pain   Evaluation Date: 3/18/2024  Authorization Period Expiration: 12/31/24  Plan of Care Expiration: 10/1/2024  Progress Note Due: 10/1/2024  Visit # / Visits authorized: 1/ 1 17/20  FOTO: COMPLETE  Precautions: Standard      Surgery: Knee Arthroscopy Performed by Claude Williams 10/27/23  Days Post-Op: 23 Weeks and 3 Days (4/8/24)**    PTA Visit #: 0/5     Time In: 7:00 am   Time Out: 0757 am   Total Billable Time: 36 minutes      Subjective     Pt reports: Caught COVID last week and was sick. Unable to exercise for a week and now her knee is hurting and stiff. Would like to focus on improving this.     She was compliant with home exercise program.  Response to previous treatment: Initial aching   Functional change: No change     Pain: 4/10  Location:  Left Lateral Knee Around Patella and Popliteal Space      Objective      Objective Measures updated at progress report unless specified.     Reassessment Next Visit     Treatment     Dasia received the treatments listed below:      therapeutic exercises to develop strength, endurance, ROM, posture, and core stabilization for 010 minutes including:  Stationary cycle 8 minutes lvl 5  Seated HS stretch 30" x 3 BLE    manual therapy techniques: The techniques below were applied for 08 minutes:   Patellar mobilizations all planes  Medial gapping III    neuromuscular re-education activities to improve:  for 8 minutes. The following activities were included:  Bridges Cue for Glut Activation GTB 3 x 8 3 Sec Holds - NP  Clamshells Level 1 2 x 10 10 Sec " "Holds , GTB    therapeutic activities to improve functional performance for 10 minutes, including:  Standing TKE with RTB.   Standing hip hinge 20x     NP:  RDL with 25 lbs Kbs 4 x 5  Pallof press YTB 3 x 10 - NP  Step up to SLS 3" 30x ea  DL 3d strap squat series 20x ea. 10 lbs    Patient Education and Home Exercises       Education provided:   - HEP  - Plan of Care   - Relation of Proximal and Distal Joints to the Knee   - Anatomy and Physiology of the Knee Joint   - Patellar Tracking and Related Tissues     Written Home Exercises Provided: yes. Exercises were reviewed and Dasia was able to demonstrate them prior to the end of the session.  Dasia demonstrated good  understanding of the education provided. See EMR under Patient Instructions for exercises provided during therapy sessions    Assessment   Kept session light today as she is still recovering from illness. Did well with no loss of knee ROM. Improved with recumbent cycle for motion. Resume standard treatment and assess foot intrinsic strength.      Dasia Is progressing well towards her goals.   Pt prognosis is Good.     Pt will continue to benefit from skilled outpatient physical therapy to address the deficits listed in the problem list box on initial evaluation, provide pt/family education and to maximize pt's level of independence in the home and community environment.     Pt's spiritual, cultural and educational needs considered and pt agreeable to plan of care and goals.     Anticipated barriers to physical therapy: None at this time     Goals:   Short Term Goals (4 Weeks):   1. Pt will be compliant with HEP to supplement PT in restoring pain free function.  2. Pt will squat form to improve posterior chain loading and glut activation and decrease anterior knee loading.   3. Pt will improve impaired LE MMTs by 1/2 grade  to improve strength for functional tasks  4. Pt improve impaired knee extension ROM by 5 deg to improve mobility for normal movement " patterns.   Long Term Goals (8 Weeks):  1. Pt will improve FOTO score to </= TBD% limited to decrease perceived limitation with mobility  2. Pt will demonstrate improved single leg loading without pain limitation to facilitate return to exercise classes.   3. Pt will improve impaired LE MMTs by 1 grade to improve strength for functional tasks.  4. Pt improve impaired knee ROM to WNL in all planes to improve mobility for normal movement patterns.   5. Pt will report pain of </=1/10 with ADLs to improve QOL and tolerance to activity.   Plan      Plan of care Certification: 3/18/2024 to 10/1/2024.     Outpatient Physical Therapy 3 times weekly for 12 weeks to include the following interventions: Cervical/Lumbar Traction, Electrical Stimulation , Gait Training, Manual Therapy, Moist Heat/ Ice, Neuromuscular Re-ed, Patient Education, Self Care, Therapeutic Activities, Therapeutic Exercise, and Ultrasound.     Tim Lynn, PT

## 2024-06-26 ENCOUNTER — CLINICAL SUPPORT (OUTPATIENT)
Dept: REHABILITATION | Facility: HOSPITAL | Age: 69
End: 2024-06-26
Payer: COMMERCIAL

## 2024-06-26 DIAGNOSIS — R29.898 WEAKNESS OF BOTH HIPS: Primary | ICD-10-CM

## 2024-06-26 DIAGNOSIS — M25.662 DECREASED RANGE OF MOTION OF BOTH KNEES: ICD-10-CM

## 2024-06-26 DIAGNOSIS — M25.661 DECREASED RANGE OF MOTION OF BOTH KNEES: ICD-10-CM

## 2024-06-26 PROCEDURE — 97140 MANUAL THERAPY 1/> REGIONS: CPT | Performed by: PHYSICAL THERAPIST

## 2024-06-26 PROCEDURE — 97112 NEUROMUSCULAR REEDUCATION: CPT | Performed by: PHYSICAL THERAPIST

## 2024-06-26 PROCEDURE — 97110 THERAPEUTIC EXERCISES: CPT | Performed by: PHYSICAL THERAPIST

## 2024-06-28 NOTE — PROGRESS NOTES
[facility]   Physical Therapy Treatment Note      Name: Dasia Henning  Clinic Number: [id number]    Therapy Diagnosis:   Encounter Diagnoses   Name Primary?    Weakness of both hips Yes    Decreased range of motion of both knees      Physician: Williams, Claude S. IV,*    Visit Date: 6/26/2024  Physician Orders: PT Eval and Treat   Medical Diagnosis from Referral:   M25.562 (ICD-10-CM) - Left knee pain   Evaluation Date: 3/18/2024  Authorization Period Expiration: 12/31/24  Plan of Care Expiration: 10/1/2024  Progress Note Due: 10/1/2024  Visit # / Visits authorized: 1/ 1 17/20  FOTO: COMPLETE  Precautions: Standard      Surgery: Knee Arthroscopy Performed by Claude Williams 10/27/23  Days Post-Op: 23 Weeks and 3 Days (4/8/24)**    PTA Visit #: 0/5     Time In: 1424  Time Out: 1535 am   Total Billable Time: 43 minutes      Subjective     Pt reports: Caught COVID last week and was sick. Unable to exercise for a week and now her knee is hurting and stiff. Would like to focus on improving this.     She was compliant with home exercise program.  Response to previous treatment: Initial aching   Functional change: No change     Pain: 4/10  Location:  Left Lateral Knee Around Patella and Popliteal Space      Objective      Objective Measures updated at progress report unless specified.     Assessment of foot and ankle strength:     Observation: Pes planovalgus on RLE    Active Range of Motion:   Ankle Right Left   DF (knee extended) -5 10   DF (knee bent) -5 10   Plantarflexion 50 50   Inversion 30 30   Eversion 0 10      Strength:  Ankle Right Left   Dorsiflexion 3-/5 5/5   Plantarflexion, knee extended 4/5 5/5   Soleus 3-/5 4/5   Posterior Tibialis 3-/5 5/5   Fibularis longus and brevis 5/5 5/5     Foot Right Left   FDL 5/5 5/5   FDB 3-/5 4/5   FHL 3-/5 5/5   FDB 3-/5 4/5   Lumbricals 3-/5 3-/5          Treatment     Dasia received the treatments listed below:      therapeutic exercises to  "develop strength, endurance, ROM, posture, and core stabilization for 010 minutes including:  Stationary cycle 8 minutes lvl 5  Seated HS stretch 30" x 3 BLE    manual therapy techniques: The techniques below were applied for 08 minutes:   Patellar mobilizations all planes  Medial gapping III  Talocrural mobilization IV    neuromuscular re-education activities to improve:  for 25 minutes. The following activities were included:  Foot and ankle assessment as above  Toe curl 30x  Short foot 10 x 10"  DF self mobilization 20x    therapeutic activities to improve functional performance for 00 minutes, including:       NP:  RDL with 25 lbs Kbs 4 x 5  Pallof press YTB 3 x 10 - NP  Step up to SLS 3" 30x ea  DL 3d strap squat series 20x ea. 10 lbs  Standing TKE with RTB.   Standing hip hinge 20x    Patient Education and Home Exercises       Education provided:   - HEP  - Plan of Care   - Relation of Proximal and Distal Joints to the Knee   - Anatomy and Physiology of the Knee Joint   - Patellar Tracking and Related Tissues     Written Home Exercises Provided: yes. Exercises were reviewed and Dasia was able to demonstrate them prior to the end of the session.  Dasia demonstrated good  understanding of the education provided. See EMR under Patient Instructions for exercises provided during therapy sessions    Assessment   Assessed foot an and ankle secondary to balance deficits causing increased loading to L knee. Significant deficits in R ankle ROM and foot intrinsic strength causing gait deviations and overload to L knee. Initiated mobility and strength work. Will build into full functional motions such as squatting.      Dasia Is progressing well towards her goals.   Pt prognosis is Good.     Pt will continue to benefit from skilled outpatient physical therapy to address the deficits listed in the problem list box on initial evaluation, provide pt/family education and to maximize pt's level of independence in the home and " community environment.     Pt's spiritual, cultural and educational needs considered and pt agreeable to plan of care and goals.     Anticipated barriers to physical therapy: None at this time     Goals:   Short Term Goals (4 Weeks): (met)  1. Pt will be compliant with HEP to supplement PT in restoring pain free function.  2. Pt will squat form to improve posterior chain loading and glut activation and decrease anterior knee loading.   3. Pt will improve impaired LE MMTs by 1/2 grade  to improve strength for functional tasks  4. Pt improve impaired knee extension ROM by 5 deg to improve mobility for normal movement patterns.   Long Term Goals (8 Weeks): (progressing, not met)  1. Pt will improve FOTO score to </= TBD% limited to decrease perceived limitation with mobility  2. Pt will demonstrate improved single leg loading without pain limitation to facilitate return to exercise classes.   3. Pt will improve impaired LE MMTs by 1 grade to improve strength for functional tasks.  4. Pt improve impaired knee ROM to WNL in all planes to improve mobility for normal movement patterns.   5. Pt will report pain of </=1/10 with ADLs to improve QOL and tolerance to activity.   Plan      Plan of care Certification: 3/18/2024 to 10/1/2024.     Outpatient Physical Therapy 3 times weekly for 12 weeks to include the following interventions: Cervical/Lumbar Traction, Electrical Stimulation , Gait Training, Manual Therapy, Moist Heat/ Ice, Neuromuscular Re-ed, Patient Education, Self Care, Therapeutic Activities, Therapeutic Exercise, and Ultrasound.     Tim Lynn, PT

## 2024-07-01 ENCOUNTER — CLINICAL SUPPORT (OUTPATIENT)
Dept: REHABILITATION | Facility: HOSPITAL | Age: 69
End: 2024-07-01
Payer: COMMERCIAL

## 2024-07-01 DIAGNOSIS — M25.662 DECREASED RANGE OF MOTION OF BOTH KNEES: ICD-10-CM

## 2024-07-01 DIAGNOSIS — M25.661 DECREASED RANGE OF MOTION OF BOTH KNEES: ICD-10-CM

## 2024-07-01 DIAGNOSIS — R29.898 WEAKNESS OF BOTH HIPS: Primary | ICD-10-CM

## 2024-07-01 PROCEDURE — 97140 MANUAL THERAPY 1/> REGIONS: CPT | Performed by: PHYSICAL THERAPIST

## 2024-07-01 PROCEDURE — 97110 THERAPEUTIC EXERCISES: CPT | Performed by: PHYSICAL THERAPIST

## 2024-07-01 PROCEDURE — 97112 NEUROMUSCULAR REEDUCATION: CPT | Performed by: PHYSICAL THERAPIST

## 2024-07-04 NOTE — PROGRESS NOTES
[facility]   Physical Therapy Treatment Note      Name: Dasia Henning  Clinic Number: [id number]    Therapy Diagnosis:   Encounter Diagnoses   Name Primary?    Weakness of both hips Yes    Decreased range of motion of both knees      Physician: Williams, Claude S. IV,*    Visit Date: 7/1/2024  Physician Orders: PT Eval and Treat   Medical Diagnosis from Referral:   M25.562 (ICD-10-CM) - Left knee pain   Evaluation Date: 3/18/2024  Authorization Period Expiration: 12/31/24  Plan of Care Expiration: 10/1/2024  Progress Note Due: 10/1/2024  Visit # / Visits authorized: 1/ 1 17/20  FOTO: COMPLETE  Precautions: Standard      Surgery: Knee Arthroscopy Performed by Claude Williams 10/27/23  Days Post-Op: 23 Weeks and 3 Days (4/8/24)**    PTA Visit #: 0/5     Time In: 1222  Time Out: 1334  Total Billable Time: 43 minutes      Subjective     Pt reports: Doing much better overall. Working on her foot intrinsic strengthening. Will be going out of town for a few weeks but back at the end of the month.    She was compliant with home exercise program.  Response to previous treatment: Initial aching   Functional change: No change     Pain: 4/10  Location:  Left Lateral Knee Around Patella and Popliteal Space      Objective      Objective Measures updated at progress report unless specified.     Assessment of foot and ankle strength:     Observation: Pes planovalgus on RLE    Active Range of Motion:   Ankle Right Left   DF (knee extended) -5 10   DF (knee bent) -5 10   Plantarflexion 50 50   Inversion 30 30   Eversion 0 10      Strength:  Ankle Right Left   Dorsiflexion 3-/5 5/5   Plantarflexion, knee extended 4/5 5/5   Soleus 3-/5 4/5   Posterior Tibialis 3-/5 5/5   Fibularis longus and brevis 5/5 5/5     Foot Right Left   FDL 5/5 5/5   FDB 3-/5 4/5   FHL 3-/5 5/5   FDB 3-/5 4/5   Lumbricals 3-/5 3-/5          Treatment     Dasia received the treatments listed below:      therapeutic exercises to develop  "strength, endurance, ROM, posture, and core stabilization for 010 minutes including:  Stationary cycle 8 minutes lvl 5  Seated HS stretch 30" x 3 BLE    manual therapy techniques: The techniques below were applied for 08 minutes:   Patellar mobilizations all planes  Medial gapping III  Talocrural mobilization IV    neuromuscular re-education activities to improve:  for 25 minutes. The following activities were included:  Foot and ankle assessment as above  Toe curl 30x  Short foot 10 x 10"  DF self mobilization 20x  DL heel raise iso 10 x 10"    therapeutic activities to improve functional performance for 00 minutes, including:       NP:  RDL with 25 lbs Kbs 4 x 5  Pallof press YTB 3 x 10 - NP  Step up to SLS 3" 30x ea  DL 3d strap squat series 20x ea. 10 lbs  Standing TKE with RTB.   Standing hip hinge 20x    Patient Education and Home Exercises       Education provided:   - HEP  - Plan of Care   - Relation of Proximal and Distal Joints to the Knee   - Anatomy and Physiology of the Knee Joint   - Patellar Tracking and Related Tissues     Written Home Exercises Provided: yes. Exercises were reviewed and Dasia was able to demonstrate them prior to the end of the session.  Dasia demonstrated good  understanding of the education provided. See EMR under Patient Instructions for exercises provided during therapy sessions    Assessment   Updated HEP to include foot intrinsic strengthening for her time away from clinic. Overall she is improving. Showed better foot intrinsic control. Progressed to posterior tib strengthening with heel raise. Reassess when she returns and progress into SLB/Step up exercises.      Dasia Is progressing well towards her goals.   Pt prognosis is Good.     Pt will continue to benefit from skilled outpatient physical therapy to address the deficits listed in the problem list box on initial evaluation, provide pt/family education and to maximize pt's level of independence in the home and community " environment.     Pt's spiritual, cultural and educational needs considered and pt agreeable to plan of care and goals.     Anticipated barriers to physical therapy: None at this time     Goals:   Short Term Goals (4 Weeks): (met)  1. Pt will be compliant with HEP to supplement PT in restoring pain free function.  2. Pt will squat form to improve posterior chain loading and glut activation and decrease anterior knee loading.   3. Pt will improve impaired LE MMTs by 1/2 grade  to improve strength for functional tasks  4. Pt improve impaired knee extension ROM by 5 deg to improve mobility for normal movement patterns.   Long Term Goals (8 Weeks): (progressing, not met)  1. Pt will improve FOTO score to </= TBD% limited to decrease perceived limitation with mobility  2. Pt will demonstrate improved single leg loading without pain limitation to facilitate return to exercise classes.   3. Pt will improve impaired LE MMTs by 1 grade to improve strength for functional tasks.  4. Pt improve impaired knee ROM to WNL in all planes to improve mobility for normal movement patterns.   5. Pt will report pain of </=1/10 with ADLs to improve QOL and tolerance to activity.   Plan      Plan of care Certification: 3/18/2024 to 10/1/2024.     Outpatient Physical Therapy 3 times weekly for 12 weeks to include the following interventions: Cervical/Lumbar Traction, Electrical Stimulation , Gait Training, Manual Therapy, Moist Heat/ Ice, Neuromuscular Re-ed, Patient Education, Self Care, Therapeutic Activities, Therapeutic Exercise, and Ultrasound.     Tim Lynn, PT

## 2024-08-26 ENCOUNTER — CLINICAL SUPPORT (OUTPATIENT)
Dept: REHABILITATION | Facility: HOSPITAL | Age: 69
End: 2024-08-26
Payer: COMMERCIAL

## 2024-08-26 DIAGNOSIS — R29.898 WEAKNESS OF BOTH HIPS: Primary | ICD-10-CM

## 2024-08-26 DIAGNOSIS — M25.662 DECREASED RANGE OF MOTION OF BOTH KNEES: ICD-10-CM

## 2024-08-26 DIAGNOSIS — M25.661 DECREASED RANGE OF MOTION OF BOTH KNEES: ICD-10-CM

## 2024-08-26 PROCEDURE — 97112 NEUROMUSCULAR REEDUCATION: CPT | Performed by: PHYSICAL THERAPIST

## 2024-08-26 PROCEDURE — 97140 MANUAL THERAPY 1/> REGIONS: CPT | Performed by: PHYSICAL THERAPIST

## 2024-08-26 NOTE — PROGRESS NOTES
[facility]   Physical Therapy Treatment Note      Name: Dasia Heninng  Clinic Number: [id number]    Therapy Diagnosis:   Encounter Diagnoses   Name Primary?    Weakness of both hips Yes    Decreased range of motion of both knees      Physician: Williams, Claude S. IV,*    Visit Date: 8/26/2024  Physician Orders: PT Eval and Treat   Medical Diagnosis from Referral:   M25.562 (ICD-10-CM) - Left knee pain   Evaluation Date: 3/18/2024  Authorization Period Expiration: 12/31/24  Plan of Care Expiration: 10/1/2024  Progress Note Due: 10/1/2024  Visit # / Visits authorized: 1/ 1 21/30  FOTO: COMPLETE  Precautions: Standard      Surgery: Knee Arthroscopy Performed by Claude Williams 10/27/23  Days Post-Op: 23 Weeks and 3 Days (4/8/24)**    PTA Visit #: 0/5     Time In: 1230  Time Out: 1335  Total Billable Time: 48 minutes      Subjective     Pt reports: Has been out of town for the majority of the summer but is doing well. Has been able to complete some strengthening independently such as riding her bike and doing mat exercises. Still has intermittent difficulty with sustained positions or when she makes certain movements.     She was compliant with home exercise program.  Response to previous treatment: Initial aching   Functional change: No change     Pain: 4/10  Location:  Left Lateral Knee Around Patella and Popliteal Space      Objective      Objective Measures updated at progress report unless specified.     Assessment of foot and ankle strength:     Observation: Pes planovalgus on RLE    Active Range of Motion:   Ankle Right Left   DF (knee extended) 0 10   DF (knee bent) 0 10   Plantarflexion 50 50   Inversion 30 30   Eversion 0 10      Strength:  Ankle Right Left   Dorsiflexion 3/5 5/5   Plantarflexion, knee extended 4/5 5/5   Soleus 3/5 4/5   Posterior Tibialis 3-/5 5/5   Fibularis longus and brevis 5/5 5/5     Foot Right Left   FDL 5/5 5/5   FDB 4/5 4/5   FHL 4/5 5/5   FDB 4/5 4/5  "  Lumbricals 3+/5 3-/5        Treatment     Dasia received the treatments listed below:      therapeutic exercises to develop strength, endurance, ROM, posture, and core stabilization for 00 minutes including:      manual therapy techniques: The techniques below were applied for 08 minutes:   Joint and mobility assessment as above  Patellar mobilizations all planes  Medial gapping III  Talocrural mobilization IV    neuromuscular re-education activities to improve:  for 40 minutes. The following activities were included:  Foot and ankle assessment as above  Toe curl 30x  Short foot 10 x 10"  DF self mobilization 20x  DL heel raise iso 10 x 10"  Articulating bridge 3 x 10  Clamshell GTB 10 x 10"  Quadriceps isometric 60 degrees, 10 x 10"    therapeutic activities to improve functional performance for 00 minutes, including:         Patient Education and Home Exercises       Education provided:   - HEP  - Plan of Care   - Relation of Proximal and Distal Joints to the Knee   - Anatomy and Physiology of the Knee Joint   - Patellar Tracking and Related Tissues     Written Home Exercises Provided: yes. Exercises were reviewed and Dasia was able to demonstrate them prior to the end of the session.  Dasia demonstrated good  understanding of the education provided. See EMR under Patient Instructions for exercises provided during therapy sessions    Assessment   Dasia returns with improvements compared to when previously seen. Ankle and foot improvements can be seen on the RLE which have reduced gait deviations. These gait corrections are resulting in less strain on the LLE and decreasing knee pain. Still shows significant quad and gluteal deficits which increase knee pain. Will continue with focus from the foot/ankle upwards.       Dasia Is progressing well towards her goals.   Pt prognosis is Good.     Pt will continue to benefit from skilled outpatient physical therapy to address the deficits listed in the problem list box " on initial evaluation, provide pt/family education and to maximize pt's level of independence in the home and community environment.     Pt's spiritual, cultural and educational needs considered and pt agreeable to plan of care and goals.     Anticipated barriers to physical therapy: None at this time     Goals:   Short Term Goals (4 Weeks): (met)  1. Pt will be compliant with HEP to supplement PT in restoring pain free function.  2. Pt will squat form to improve posterior chain loading and glut activation and decrease anterior knee loading.   3. Pt will improve impaired LE MMTs by 1/2 grade  to improve strength for functional tasks  4. Pt improve impaired knee extension ROM by 5 deg to improve mobility for normal movement patterns.   Long Term Goals (8 Weeks): (progressing, not met)  1. Pt will improve FOTO score to </= TBD% limited to decrease perceived limitation with mobility  2. Pt will demonstrate improved single leg loading without pain limitation to facilitate return to exercise classes.   3. Pt will improve impaired LE MMTs by 1 grade to improve strength for functional tasks.  4. Pt improve impaired knee ROM to WNL in all planes to improve mobility for normal movement patterns.   5. Pt will report pain of </=1/10 with ADLs to improve QOL and tolerance to activity.   Plan      Plan of care Certification: 3/18/2024 to 10/1/2024.     Outpatient Physical Therapy 3 times weekly for 12 weeks to include the following interventions: Cervical/Lumbar Traction, Electrical Stimulation , Gait Training, Manual Therapy, Moist Heat/ Ice, Neuromuscular Re-ed, Patient Education, Self Care, Therapeutic Activities, Therapeutic Exercise, and Ultrasound.     Tim Lynn, PT

## 2024-08-30 ENCOUNTER — CLINICAL SUPPORT (OUTPATIENT)
Dept: REHABILITATION | Facility: HOSPITAL | Age: 69
End: 2024-08-30
Payer: COMMERCIAL

## 2024-08-30 DIAGNOSIS — M25.661 DECREASED RANGE OF MOTION OF BOTH KNEES: ICD-10-CM

## 2024-08-30 DIAGNOSIS — M25.662 DECREASED RANGE OF MOTION OF BOTH KNEES: ICD-10-CM

## 2024-08-30 DIAGNOSIS — R29.898 WEAKNESS OF BOTH HIPS: Primary | ICD-10-CM

## 2024-08-30 PROCEDURE — 97112 NEUROMUSCULAR REEDUCATION: CPT | Performed by: PHYSICAL THERAPIST

## 2024-08-30 PROCEDURE — 97110 THERAPEUTIC EXERCISES: CPT | Performed by: PHYSICAL THERAPIST

## 2024-08-30 NOTE — PROGRESS NOTES
[facility]   Physical Therapy Treatment Note      Name: Dasia Henning  Clinic Number: [id number]    Therapy Diagnosis:   Encounter Diagnoses   Name Primary?    Weakness of both hips Yes    Decreased range of motion of both knees      Physician: Williams, Claude S. IV,*    Visit Date: 8/30/2024  Physician Orders: PT Eval and Treat   Medical Diagnosis from Referral:   M25.562 (ICD-10-CM) - Left knee pain   Evaluation Date: 3/18/2024  Authorization Period Expiration: 12/31/24  Plan of Care Expiration: 10/1/2024  Progress Note Due: 10/1/2024  Visit # / Visits authorized: 1/ 1 21/30  FOTO: COMPLETE  Precautions: Standard      Surgery: Knee Arthroscopy Performed by Claude Williams 10/27/23  Days Post-Op: 23 Weeks and 3 Days (4/8/24)**    PTA Visit #: 0/5     Time In: 0701  Time Out: 0800  Total Billable Time: 48 minutes      Subjective     Pt reports: Felt muscle soreness after the last session which is good.     She was compliant with home exercise program.  Response to previous treatment: Initial aching   Functional change: No change     Pain: 4/10  Location:  Left Lateral Knee Around Patella and Popliteal Space      Objective      Objective Measures updated at progress report unless specified.     Assessment of foot and ankle strength:     Observation: Pes planovalgus on RLE    Active Range of Motion:   Ankle Right Left   DF (knee extended) 0 10   DF (knee bent) 0 10   Plantarflexion 50 50   Inversion 30 30   Eversion 0 10      Strength:  Ankle Right Left   Dorsiflexion 3/5 5/5   Plantarflexion, knee extended 4/5 5/5   Soleus 3/5 4/5   Posterior Tibialis 3-/5 5/5   Fibularis longus and brevis 5/5 5/5     Foot Right Left   FDL 5/5 5/5   FDB 4/5 4/5   FHL 4/5 5/5   FDB 4/5 4/5   Lumbricals 3+/5 3-/5        Treatment     Dasia received the treatments listed below:      therapeutic exercises to develop strength, endurance, ROM, posture, and core stabilization for 08 minutes including:  Stationary  "cycle 8 minutes    manual therapy techniques: The techniques below were applied for 0 minutes:   Joint and mobility assessment as above  Patellar mobilizations all planes  Medial gapping III  Talocrural mobilization IV    neuromuscular re-education activities to improve:  for 40 minutes. The following activities were included:  Foot and ankle assessment as above  Short foot toes supported 10 x 10"  Short foot 10 x 10"  Standing short foot 10" x 5  Posterior pelvic tilt 10 x 10"  Articulating bridge 3 x 10  Clamshell GTB 10 x 10"  Quadriceps isometric 60 degrees, 10 x 10"  SL hip ABD 3 x 10    therapeutic activities to improve functional performance for 00 minutes, including:         Patient Education and Home Exercises       Education provided:   - HEP  - Plan of Care   - Relation of Proximal and Distal Joints to the Knee   - Anatomy and Physiology of the Knee Joint   - Patellar Tracking and Related Tissues     Written Home Exercises Provided: yes. Exercises were reviewed and Dasia was able to demonstrate them prior to the end of the session.  Dasia demonstrated good  understanding of the education provided. See EMR under Patient Instructions for exercises provided during therapy sessions    Assessment   Doing very well with basic strengthening. Shows improved foot intrinsic strength and gluteal control. Doing well with quad isometrics. Progress to functional strengthening such as Dls and squats.      Dasia Is progressing well towards her goals.   Pt prognosis is Good.     Pt will continue to benefit from skilled outpatient physical therapy to address the deficits listed in the problem list box on initial evaluation, provide pt/family education and to maximize pt's level of independence in the home and community environment.     Pt's spiritual, cultural and educational needs considered and pt agreeable to plan of care and goals.     Anticipated barriers to physical therapy: None at this time     Goals:   Short Term " Goals (4 Weeks): (met)  1. Pt will be compliant with HEP to supplement PT in restoring pain free function.  2. Pt will squat form to improve posterior chain loading and glut activation and decrease anterior knee loading.   3. Pt will improve impaired LE MMTs by 1/2 grade  to improve strength for functional tasks  4. Pt improve impaired knee extension ROM by 5 deg to improve mobility for normal movement patterns.   Long Term Goals (8 Weeks): (progressing, not met)  1. Pt will improve FOTO score to </= TBD% limited to decrease perceived limitation with mobility  2. Pt will demonstrate improved single leg loading without pain limitation to facilitate return to exercise classes.   3. Pt will improve impaired LE MMTs by 1 grade to improve strength for functional tasks.  4. Pt improve impaired knee ROM to WNL in all planes to improve mobility for normal movement patterns.   5. Pt will report pain of </=1/10 with ADLs to improve QOL and tolerance to activity.   Plan      Plan of care Certification: 3/18/2024 to 10/1/2024.     Outpatient Physical Therapy 3 times weekly for 12 weeks to include the following interventions: Cervical/Lumbar Traction, Electrical Stimulation , Gait Training, Manual Therapy, Moist Heat/ Ice, Neuromuscular Re-ed, Patient Education, Self Care, Therapeutic Activities, Therapeutic Exercise, and Ultrasound.     Tim Lynn, PT

## 2024-09-09 ENCOUNTER — CLINICAL SUPPORT (OUTPATIENT)
Dept: REHABILITATION | Facility: HOSPITAL | Age: 69
End: 2024-09-09
Payer: COMMERCIAL

## 2024-09-09 DIAGNOSIS — M25.662 DECREASED RANGE OF MOTION OF BOTH KNEES: ICD-10-CM

## 2024-09-09 DIAGNOSIS — M25.661 DECREASED RANGE OF MOTION OF BOTH KNEES: ICD-10-CM

## 2024-09-09 DIAGNOSIS — R29.898 WEAKNESS OF BOTH HIPS: Primary | ICD-10-CM

## 2024-09-09 PROCEDURE — 97110 THERAPEUTIC EXERCISES: CPT | Performed by: PHYSICAL THERAPIST

## 2024-09-09 PROCEDURE — 97530 THERAPEUTIC ACTIVITIES: CPT | Performed by: PHYSICAL THERAPIST

## 2024-09-09 PROCEDURE — 97112 NEUROMUSCULAR REEDUCATION: CPT | Performed by: PHYSICAL THERAPIST

## 2024-09-09 NOTE — PROGRESS NOTES
[facility]   Physical Therapy Treatment Note      Name: Dasia Henning  Clinic Number: [id number]    Therapy Diagnosis:   Encounter Diagnoses   Name Primary?    Weakness of both hips Yes    Decreased range of motion of both knees      Physician: Williams, Claude S. IV,*    Visit Date: 9/9/2024  Physician Orders: PT Eval and Treat   Medical Diagnosis from Referral:   M25.562 (ICD-10-CM) - Left knee pain   Evaluation Date: 3/18/2024  Authorization Period Expiration: 12/31/24  Plan of Care Expiration: 10/1/2024  Progress Note Due: 10/1/2024  Visit # / Visits authorized: 1/ 1 21/30  FOTO: COMPLETE  Precautions: Standard      Surgery: Knee Arthroscopy Performed by Claude Williams 10/27/23  Days Post-Op: 23 Weeks and 3 Days (4/8/24)**    PTA Visit #: 0/5     Time In: 0801  Time Out: 0904  Total Billable Time: 48 minutes      Subjective     Pt reports: Has been able to complete her exercises. Was in a long car ride this weekend but did well overall.    She was compliant with home exercise program.  Response to previous treatment: Initial aching   Functional change: No change     Pain: 4/10  Location:  Left Lateral Knee Around Patella and Popliteal Space      Objective      Objective Measures updated at progress report unless specified.     Assessment of foot and ankle strength:     Observation: Pes planovalgus on RLE    Active Range of Motion:   Ankle Right Left   DF (knee extended) 0 10   DF (knee bent) 0 10   Plantarflexion 50 50   Inversion 30 30   Eversion 0 10      Strength:  Ankle Right Left   Dorsiflexion 3/5 5/5   Plantarflexion, knee extended 4/5 5/5   Soleus 3/5 4/5   Posterior Tibialis 3-/5 5/5   Fibularis longus and brevis 5/5 5/5     Foot Right Left   FDL 5/5 5/5   FDB 4/5 4/5   FHL 4/5 5/5   FDB 4/5 4/5   Lumbricals 3+/5 3-/5        Treatment     Dasia received the treatments listed below:      therapeutic exercises to develop strength, endurance, ROM, posture, and core stabilization  "for 08 minutes including:  Stationary cycle 8 minutes    manual therapy techniques: The techniques below were applied for 0 minutes:   Joint and mobility assessment as above  Patellar mobilizations all planes  Medial gapping III  Talocrural mobilization IV    neuromuscular re-education activities to improve:  for 30 minutes. The following activities were included:  Foot and ankle assessment as above  Short foot 10 x 10"  Standing short foot 10" x 5  Posterior pelvic tilt 10 x 10"  Articulating bridge 3 x 10  Clamshell GTB 10 x 10"  Quadriceps isometric 60 degrees, 10 x 10"  SL hip ABD 3 x 10    therapeutic activities to improve functional performance for 10 minutes, including:  Short foot to mini squat 2 x 10  Short foot to step up 6" 2 x 6       Patient Education and Home Exercises       Education provided:   - HEP  - Plan of Care   - Relation of Proximal and Distal Joints to the Knee   - Anatomy and Physiology of the Knee Joint   - Patellar Tracking and Related Tissues     Written Home Exercises Provided: yes. Exercises were reviewed and Dasia was able to demonstrate them prior to the end of the session.  Dasia demonstrated good  understanding of the education provided. See EMR under Patient Instructions for exercises provided during therapy sessions    Assessment   Able to progress foot intrinsic strengthening to standing and functional squat training today. Did well with focus on squat and core musculature. Progress into squat and hip hinge training.       Dasia Is progressing well towards her goals.   Pt prognosis is Good.     Pt will continue to benefit from skilled outpatient physical therapy to address the deficits listed in the problem list box on initial evaluation, provide pt/family education and to maximize pt's level of independence in the home and community environment.     Pt's spiritual, cultural and educational needs considered and pt agreeable to plan of care and goals.     Anticipated barriers to " physical therapy: None at this time     Goals:   Short Term Goals (4 Weeks): (met)  1. Pt will be compliant with HEP to supplement PT in restoring pain free function.  2. Pt will squat form to improve posterior chain loading and glut activation and decrease anterior knee loading.   3. Pt will improve impaired LE MMTs by 1/2 grade  to improve strength for functional tasks  4. Pt improve impaired knee extension ROM by 5 deg to improve mobility for normal movement patterns.   Long Term Goals (8 Weeks): (progressing, not met)  1. Pt will improve FOTO score to </= TBD% limited to decrease perceived limitation with mobility  2. Pt will demonstrate improved single leg loading without pain limitation to facilitate return to exercise classes.   3. Pt will improve impaired LE MMTs by 1 grade to improve strength for functional tasks.  4. Pt improve impaired knee ROM to WNL in all planes to improve mobility for normal movement patterns.   5. Pt will report pain of </=1/10 with ADLs to improve QOL and tolerance to activity.   Plan      Plan of care Certification: 3/18/2024 to 10/1/2024.     Outpatient Physical Therapy 3 times weekly for 12 weeks to include the following interventions: Cervical/Lumbar Traction, Electrical Stimulation , Gait Training, Manual Therapy, Moist Heat/ Ice, Neuromuscular Re-ed, Patient Education, Self Care, Therapeutic Activities, Therapeutic Exercise, and Ultrasound.     Tim Lynn, PT, DPT

## 2024-09-16 ENCOUNTER — CLINICAL SUPPORT (OUTPATIENT)
Dept: REHABILITATION | Facility: HOSPITAL | Age: 69
End: 2024-09-16
Payer: COMMERCIAL

## 2024-09-16 DIAGNOSIS — M25.661 DECREASED RANGE OF MOTION OF BOTH KNEES: ICD-10-CM

## 2024-09-16 DIAGNOSIS — R29.898 WEAKNESS OF BOTH HIPS: Primary | ICD-10-CM

## 2024-09-16 DIAGNOSIS — M25.662 DECREASED RANGE OF MOTION OF BOTH KNEES: ICD-10-CM

## 2024-09-16 PROCEDURE — 97112 NEUROMUSCULAR REEDUCATION: CPT | Performed by: PHYSICAL THERAPIST

## 2024-09-16 PROCEDURE — 97110 THERAPEUTIC EXERCISES: CPT | Performed by: PHYSICAL THERAPIST

## 2024-09-16 PROCEDURE — 97530 THERAPEUTIC ACTIVITIES: CPT | Performed by: PHYSICAL THERAPIST

## 2024-09-16 NOTE — PROGRESS NOTES
[facility]   Physical Therapy Treatment Note      Name: Dasia Henning  Clinic Number: [id number]    Therapy Diagnosis:   Encounter Diagnoses   Name Primary?    Weakness of both hips Yes    Decreased range of motion of both knees      Physician: Williams, Claude S. IV,*    Visit Date: 9/16/2024  Physician Orders: PT Eval and Treat   Medical Diagnosis from Referral:   M25.562 (ICD-10-CM) - Left knee pain   Evaluation Date: 3/18/2024  Authorization Period Expiration: 12/31/24  Plan of Care Expiration: 10/1/2024  Progress Note Due: 10/1/2024  Visit # / Visits authorized: 1/ 1 21/30  FOTO: COMPLETE  Precautions: Standard      Surgery: Knee Arthroscopy Performed by Claude Williams 10/27/23  Days Post-Op: 23 Weeks and 3 Days (4/8/24)**    PTA Visit #: 0/5     Time In: 0805  Time Out: 0900  Total Billable Time: 48 minutes      Subjective     Pt reports: Feels great today. No increased pain after the following session.     She was compliant with home exercise program.  Response to previous treatment: Initial aching   Functional change: No change     Pain: 4/10  Location:  Left Lateral Knee Around Patella and Popliteal Space      Objective      Objective Measures updated at progress report unless specified.     Assessment of foot and ankle strength:     Observation: Pes planovalgus on RLE    Active Range of Motion:   Ankle Right Left   DF (knee extended) 0 10   DF (knee bent) 0 10   Plantarflexion 50 50   Inversion 30 30   Eversion 0 10      Strength:  Ankle Right Left   Dorsiflexion 3/5 5/5   Plantarflexion, knee extended 4/5 5/5   Soleus 3/5 4/5   Posterior Tibialis 3-/5 5/5   Fibularis longus and brevis 5/5 5/5     Foot Right Left   FDL 5/5 5/5   FDB 4/5 4/5   FHL 4/5 5/5   FDB 4/5 4/5   Lumbricals 3+/5 3-/5        Treatment     Dasia received the treatments listed below:      therapeutic exercises to develop strength, endurance, ROM, posture, and core stabilization for 08 minutes  "including:  Stationary cycle 8 minutes    manual therapy techniques: The techniques below were applied for 0 minutes:   Joint and mobility assessment as above  Patellar mobilizations all planes  Medial gapping III  Talocrural mobilization IV    neuromuscular re-education activities to improve:  for 30 minutes. The following activities were included:  Standing short foot 10" x 5  Posterior pelvic tilt 10 x 10"  Articulating bridge 3 x 10  Clamshell GTB 10 x 10"  SL Heel raise 10 x 10"    therapeutic activities to improve functional performance for 10 minutes, including:  Short foot to mini squat 2 x 10  Short foot to step up 6" 3 x 10 to 12       Patient Education and Home Exercises       Education provided:   - HEP  - Plan of Care   - Relation of Proximal and Distal Joints to the Knee   - Anatomy and Physiology of the Knee Joint   - Patellar Tracking and Related Tissues     Written Home Exercises Provided: yes. Exercises were reviewed and Dasia was able to demonstrate them prior to the end of the session.  Dasia demonstrated good  understanding of the education provided. See EMR under Patient Instructions for exercises provided during therapy sessions    Assessment   Continues with progression into squat based exercises without issue. Work into hip hinge training next session.      Dasia Is progressing well towards her goals.   Pt prognosis is Good.     Pt will continue to benefit from skilled outpatient physical therapy to address the deficits listed in the problem list box on initial evaluation, provide pt/family education and to maximize pt's level of independence in the home and community environment.     Pt's spiritual, cultural and educational needs considered and pt agreeable to plan of care and goals.     Anticipated barriers to physical therapy: None at this time     Goals:   Short Term Goals (4 Weeks): (met)  1. Pt will be compliant with HEP to supplement PT in restoring pain free function.  2. Pt will " squat form to improve posterior chain loading and glut activation and decrease anterior knee loading.   3. Pt will improve impaired LE MMTs by 1/2 grade  to improve strength for functional tasks  4. Pt improve impaired knee extension ROM by 5 deg to improve mobility for normal movement patterns.   Long Term Goals (8 Weeks): (progressing, not met)  1. Pt will improve FOTO score to </= TBD% limited to decrease perceived limitation with mobility  2. Pt will demonstrate improved single leg loading without pain limitation to facilitate return to exercise classes.   3. Pt will improve impaired LE MMTs by 1 grade to improve strength for functional tasks.  4. Pt improve impaired knee ROM to WNL in all planes to improve mobility for normal movement patterns.   5. Pt will report pain of </=1/10 with ADLs to improve QOL and tolerance to activity.   Plan      Plan of care Certification: 3/18/2024 to 10/1/2024.     Outpatient Physical Therapy 3 times weekly for 12 weeks to include the following interventions: Cervical/Lumbar Traction, Electrical Stimulation , Gait Training, Manual Therapy, Moist Heat/ Ice, Neuromuscular Re-ed, Patient Education, Self Care, Therapeutic Activities, Therapeutic Exercise, and Ultrasound.     Tim Lynn, PT, DPT

## 2024-09-20 ENCOUNTER — CLINICAL SUPPORT (OUTPATIENT)
Dept: REHABILITATION | Facility: HOSPITAL | Age: 69
End: 2024-09-20
Payer: COMMERCIAL

## 2024-09-20 DIAGNOSIS — R29.898 WEAKNESS OF BOTH HIPS: Primary | ICD-10-CM

## 2024-09-20 DIAGNOSIS — M25.662 DECREASED RANGE OF MOTION OF BOTH KNEES: ICD-10-CM

## 2024-09-20 DIAGNOSIS — M25.661 DECREASED RANGE OF MOTION OF BOTH KNEES: ICD-10-CM

## 2024-09-20 PROCEDURE — 97110 THERAPEUTIC EXERCISES: CPT | Performed by: PHYSICAL THERAPIST

## 2024-09-20 PROCEDURE — 97140 MANUAL THERAPY 1/> REGIONS: CPT | Performed by: PHYSICAL THERAPIST

## 2024-09-20 PROCEDURE — 97112 NEUROMUSCULAR REEDUCATION: CPT | Performed by: PHYSICAL THERAPIST

## 2024-09-22 NOTE — PROGRESS NOTES
[facility]   Physical Therapy Treatment Note      Name: Dasia Henning  Clinic Number: [id number]    Therapy Diagnosis:   Encounter Diagnoses   Name Primary?    Weakness of both hips Yes    Decreased range of motion of both knees      Physician: Williams, Claude S. IV,*    Visit Date: 9/20/2024  Physician Orders: PT Eval and Treat   Medical Diagnosis from Referral:   M25.562 (ICD-10-CM) - Left knee pain   Evaluation Date: 3/18/2024  Authorization Period Expiration: 12/31/24  Plan of Care Expiration: 10/1/2024  Progress Note Due: 10/1/2024  Visit # / Visits authorized: 1/ 1 21/30  FOTO: COMPLETE  Precautions: Standard      Surgery: Knee Arthroscopy Performed by Claude Williams 10/27/23  Days Post-Op: 23 Weeks and 3 Days (4/8/24)**    PTA Visit #: 0/5     Time In: 0809  Time Out: 0905  Total Billable Time: 40 minutes      Subjective     Pt reports: Fell on wednesday onto her knee and is having some significant discomfort. Some increased swelling. Has been icing.     She was compliant with home exercise program.  Response to previous treatment: Initial aching   Functional change: No change     Pain: 4/10  Location:  Left Lateral Knee Around Patella and Popliteal Space      Objective      Objective Measures updated at progress report unless specified.     Assessment of foot and ankle strength:     Observation: Pes planovalgus on RLE    Active Range of Motion:   Ankle Right Left   DF (knee extended) 0 10   DF (knee bent) 0 10   Plantarflexion 50 50   Inversion 30 30   Eversion 0 10      Strength:  Ankle Right Left   Dorsiflexion 3/5 5/5   Plantarflexion, knee extended 4/5 5/5   Soleus 3/5 4/5   Posterior Tibialis 3-/5 5/5   Fibularis longus and brevis 5/5 5/5     Foot Right Left   FDL 5/5 5/5   FDB 4/5 4/5   FHL 4/5 5/5   FDB 4/5 4/5   Lumbricals 3+/5 3-/5        Treatment     Dasia received the treatments listed below:      therapeutic exercises to develop strength, endurance, ROM, posture, and  "core stabilization for 08 minutes including:  Stationary cycle 8 minutes - rocking  HS self prop mobilization 10 x ea    manual therapy techniques: The techniques below were applied for 08 minutes:   Joint and mobility assessment as above  Patellar mobilizations all planes  Medial gapping III  Talocrural mobilization IV    neuromuscular re-education activities to improve:  for 24 minutes. The following activities were included:  Seated short foot 20x  Seated toe curls 20x  QS in long sitting 2 x 10    NP  Standing short foot 10" x 5  Posterior pelvic tilt 10 x 10"  Articulating bridge 3 x 10  Clamshell GTB 10 x 10"  SL Heel raise 10 x 10"    therapeutic activities to improve functional performance for 00 minutes, including:    NP  Short foot to mini squat 2 x 10  Short foot to step up 6" 3 x 10 to 12       Patient Education and Home Exercises       Education provided:   - HEP  - Plan of Care   - Relation of Proximal and Distal Joints to the Knee   - Anatomy and Physiology of the Knee Joint   - Patellar Tracking and Related Tissues     Written Home Exercises Provided: yes. Exercises were reviewed and Dasia was able to demonstrate them prior to the end of the session.  Dasia demonstrated good  understanding of the education provided. See EMR under Patient Instructions for exercises provided during therapy sessions    Assessment   Unable to progress into squat based exercises due to acute knee injury. No significant injury appears to be present however she did have more discomfort, decreased knee extension. Will work on interventions to reduce pain, improve mobility until symptoms reduce.       Dasia Is progressing well towards her goals.   Pt prognosis is Good.     Pt will continue to benefit from skilled outpatient physical therapy to address the deficits listed in the problem list box on initial evaluation, provide pt/family education and to maximize pt's level of independence in the home and community environment. "     Pt's spiritual, cultural and educational needs considered and pt agreeable to plan of care and goals.     Anticipated barriers to physical therapy: None at this time     Goals:   Short Term Goals (4 Weeks): (met)  1. Pt will be compliant with HEP to supplement PT in restoring pain free function.  2. Pt will squat form to improve posterior chain loading and glut activation and decrease anterior knee loading.   3. Pt will improve impaired LE MMTs by 1/2 grade  to improve strength for functional tasks  4. Pt improve impaired knee extension ROM by 5 deg to improve mobility for normal movement patterns.   Long Term Goals (8 Weeks): (progressing, not met)  1. Pt will improve FOTO score to </= TBD% limited to decrease perceived limitation with mobility  2. Pt will demonstrate improved single leg loading without pain limitation to facilitate return to exercise classes.   3. Pt will improve impaired LE MMTs by 1 grade to improve strength for functional tasks.  4. Pt improve impaired knee ROM to WNL in all planes to improve mobility for normal movement patterns.   5. Pt will report pain of </=1/10 with ADLs to improve QOL and tolerance to activity.   Plan      Plan of care Certification: 3/18/2024 to 10/1/2024.     Outpatient Physical Therapy 3 times weekly for 12 weeks to include the following interventions: Cervical/Lumbar Traction, Electrical Stimulation , Gait Training, Manual Therapy, Moist Heat/ Ice, Neuromuscular Re-ed, Patient Education, Self Care, Therapeutic Activities, Therapeutic Exercise, and Ultrasound.     Tim Lynn, PT, DPT

## 2024-09-23 ENCOUNTER — CLINICAL SUPPORT (OUTPATIENT)
Dept: REHABILITATION | Facility: HOSPITAL | Age: 69
End: 2024-09-23
Payer: COMMERCIAL

## 2024-09-23 DIAGNOSIS — M25.662 DECREASED RANGE OF MOTION OF BOTH KNEES: ICD-10-CM

## 2024-09-23 DIAGNOSIS — R29.898 WEAKNESS OF BOTH HIPS: Primary | ICD-10-CM

## 2024-09-23 DIAGNOSIS — M25.661 DECREASED RANGE OF MOTION OF BOTH KNEES: ICD-10-CM

## 2024-09-23 PROCEDURE — 97140 MANUAL THERAPY 1/> REGIONS: CPT | Performed by: PHYSICAL THERAPIST

## 2024-09-23 PROCEDURE — 97110 THERAPEUTIC EXERCISES: CPT | Performed by: PHYSICAL THERAPIST

## 2024-09-23 PROCEDURE — 97112 NEUROMUSCULAR REEDUCATION: CPT | Performed by: PHYSICAL THERAPIST

## 2024-09-23 NOTE — PROGRESS NOTES
[facility]   Physical Therapy Treatment Note      Name: Dasia Henning  Clinic Number: [id number]    Therapy Diagnosis:   Encounter Diagnoses   Name Primary?    Weakness of both hips Yes    Decreased range of motion of both knees      Physician: Williams, Claude S. IV,*    Visit Date: 9/23/2024  Physician Orders: PT Eval and Treat   Medical Diagnosis from Referral:   M25.562 (ICD-10-CM) - Left knee pain   Evaluation Date: 3/18/2024  Authorization Period Expiration: 12/31/24  Plan of Care Expiration: 10/1/2024  Progress Note Due: 10/1/2024  Visit # / Visits authorized: 26/30  FOTO: COMPLETE  Precautions: Standard      Surgery: Knee Arthroscopy Performed by Claude Williams 10/27/23  Days Post-Op: 23 Weeks and 3 Days (4/8/24)**    PTA Visit #: 0/5     Time In: 0805  Time Out: 0903  Total Billable Time: 40 minutes      Subjective     Pt reports: Still having a lot of knee pain from the fall.    She was compliant with home exercise program.  Response to previous treatment: Initial aching   Functional change: No change     Pain: 4/10  Location:  Left Lateral Knee Around Patella and Popliteal Space      Objective      Objective Measures updated at progress report unless specified.     Assessment of foot and ankle strength:     Observation: Pes planovalgus on RLE    Active Range of Motion:   Ankle Right Left   DF (knee extended) 0 10   DF (knee bent) 0 10   Plantarflexion 50 50   Inversion 30 30   Eversion 0 10      Strength:  Ankle Right Left   Dorsiflexion 3/5 5/5   Plantarflexion, knee extended 4/5 5/5   Soleus 3/5 4/5   Posterior Tibialis 3-/5 5/5   Fibularis longus and brevis 5/5 5/5     Foot Right Left   FDL 5/5 5/5   FDB 4/5 4/5   FHL 4/5 5/5   FDB 4/5 4/5   Lumbricals 3+/5 3-/5        Treatment     Dasia received the treatments listed below:      therapeutic exercises to develop strength, endurance, ROM, posture, and core stabilization for 08 minutes including:  Stationary cycle 8 minutes -  "rocking  HS self prop mobilization 10 x ea    manual therapy techniques: The techniques below were applied for 08 minutes:   Joint and mobility assessment as above  Patellar mobilizations all planes  Medial gapping III  Talocrural mobilization IV    neuromuscular re-education activities to improve:  for 24 minutes. The following activities were included:  Seated short foot 20x  Seated toe curls 20x  QS in long sitting 2 x 10  Quad iso 10 x 10"    NP  Standing short foot 10" x 5  Posterior pelvic tilt 10 x 10"  Articulating bridge 3 x 10  Clamshell GTB 10 x 10"  SL Heel raise 10 x 10"    therapeutic activities to improve functional performance for 00 minutes, including:    NP  Short foot to mini squat 2 x 10  Short foot to step up 6" 3 x 10 to 12       Patient Education and Home Exercises       Education provided:   - HEP  - Plan of Care   - Relation of Proximal and Distal Joints to the Knee   - Anatomy and Physiology of the Knee Joint   - Patellar Tracking and Related Tissues     Written Home Exercises Provided: yes. Exercises were reviewed and Dasia was able to demonstrate them prior to the end of the session.  Dasia demonstrated good  understanding of the education provided. See EMR under Patient Instructions for exercises provided during therapy sessions    Assessment   Improved mobility and decreased irritability this session compared to the previous week. Taking steps in the right direction to get over this acute knee irritation. Able to resume the majority of her exercises. Still issues with knee extension. Will gradually progress into knee extension.      Dasia Is progressing well towards her goals.   Pt prognosis is Good.     Pt will continue to benefit from skilled outpatient physical therapy to address the deficits listed in the problem list box on initial evaluation, provide pt/family education and to maximize pt's level of independence in the home and community environment.     Pt's spiritual, cultural " and educational needs considered and pt agreeable to plan of care and goals.     Anticipated barriers to physical therapy: None at this time     Goals:   Short Term Goals (4 Weeks): (met)  1. Pt will be compliant with HEP to supplement PT in restoring pain free function.  2. Pt will squat form to improve posterior chain loading and glut activation and decrease anterior knee loading.   3. Pt will improve impaired LE MMTs by 1/2 grade  to improve strength for functional tasks  4. Pt improve impaired knee extension ROM by 5 deg to improve mobility for normal movement patterns.   Long Term Goals (8 Weeks): (progressing, not met)  1. Pt will improve FOTO score to </= TBD% limited to decrease perceived limitation with mobility  2. Pt will demonstrate improved single leg loading without pain limitation to facilitate return to exercise classes.   3. Pt will improve impaired LE MMTs by 1 grade to improve strength for functional tasks.  4. Pt improve impaired knee ROM to WNL in all planes to improve mobility for normal movement patterns.   5. Pt will report pain of </=1/10 with ADLs to improve QOL and tolerance to activity.   Plan      Plan of care Certification: 3/18/2024 to 10/1/2024.     Outpatient Physical Therapy 3 times weekly for 12 weeks to include the following interventions: Cervical/Lumbar Traction, Electrical Stimulation , Gait Training, Manual Therapy, Moist Heat/ Ice, Neuromuscular Re-ed, Patient Education, Self Care, Therapeutic Activities, Therapeutic Exercise, and Ultrasound.     Tim Lynn, PT, DPT

## 2024-09-25 ENCOUNTER — CLINICAL SUPPORT (OUTPATIENT)
Dept: REHABILITATION | Facility: HOSPITAL | Age: 69
End: 2024-09-25
Payer: COMMERCIAL

## 2024-09-25 DIAGNOSIS — R29.898 WEAKNESS OF BOTH HIPS: Primary | ICD-10-CM

## 2024-09-25 DIAGNOSIS — M25.661 DECREASED RANGE OF MOTION OF BOTH KNEES: ICD-10-CM

## 2024-09-25 DIAGNOSIS — M25.662 DECREASED RANGE OF MOTION OF BOTH KNEES: ICD-10-CM

## 2024-09-25 PROCEDURE — 97110 THERAPEUTIC EXERCISES: CPT | Performed by: PHYSICAL THERAPIST

## 2024-09-25 PROCEDURE — 97140 MANUAL THERAPY 1/> REGIONS: CPT | Performed by: PHYSICAL THERAPIST

## 2024-09-25 PROCEDURE — 97112 NEUROMUSCULAR REEDUCATION: CPT | Performed by: PHYSICAL THERAPIST

## 2024-09-25 PROCEDURE — 97530 THERAPEUTIC ACTIVITIES: CPT | Performed by: PHYSICAL THERAPIST

## 2024-09-26 NOTE — PROGRESS NOTES
[facility]   Physical Therapy Treatment Note      Name: Dasia Henning  Clinic Number: [id number]    Therapy Diagnosis:   Encounter Diagnoses   Name Primary?    Weakness of both hips Yes    Decreased range of motion of both knees      Physician: Williams, Claude S. IV,*    Visit Date: 9/25/2024  Physician Orders: PT Eval and Treat   Medical Diagnosis from Referral:   M25.562 (ICD-10-CM) - Left knee pain   Evaluation Date: 3/18/2024  Authorization Period Expiration: 12/31/24  Plan of Care Expiration: 10/1/2024  Progress Note Due: 10/1/2024  Visit # / Visits authorized: 27/30  FOTO: COMPLETE  Precautions: Standard      Surgery: Knee Arthroscopy Performed by Claude Williams 10/27/23  Days Post-Op: 23 Weeks and 3 Days (4/8/24)**    PTA Visit #: 0/5     Time In: 1430  Time Out: 1530  Total Billable Time: 36 minutes      Subjective     Pt reports: Slowly progressing from the fall. Encouraged by her progress.    She was compliant with home exercise program.  Response to previous treatment: Initial aching   Functional change: No change     Pain: 4/10  Location:  Left Lateral Knee Around Patella and Popliteal Space      Objective      Objective Measures updated at progress report unless specified.     Assessment of foot and ankle strength:     Observation: Pes planovalgus on RLE    Active Range of Motion:   Ankle Right Left   DF (knee extended) 0 10   DF (knee bent) 0 10   Plantarflexion 50 50   Inversion 30 30   Eversion 0 10      Strength:  Ankle Right Left   Dorsiflexion 3/5 5/5   Plantarflexion, knee extended 4/5 5/5   Soleus 3/5 4/5   Posterior Tibialis 3-/5 5/5   Fibularis longus and brevis 5/5 5/5     Foot Right Left   FDL 5/5 5/5   FDB 4/5 4/5   FHL 4/5 5/5   FDB 4/5 4/5   Lumbricals 3+/5 3-/5        Treatment     Dasia received the treatments listed below:      therapeutic exercises to develop strength, endurance, ROM, posture, and core stabilization for 08 minutes including:  Stationary  "cycle 8 minutes     manual therapy techniques: The techniques below were applied for 08 minutes:   Joint and mobility assessment as above  Patellar mobilizations all planes  Medial gapping III  Talocrural mobilization IV    neuromuscular re-education activities to improve:  for 10 minutes. The following activities were included:  HS bridge 20 x 5"  SL clamshell 10 x 10"      NP  Standing short foot 10" x 5  Posterior pelvic tilt 10 x 10"  Articulating bridge 3 x 10  Clamshell GTB 10 x 10"  SL Heel raise 10 x 10"    therapeutic activities to improve functional performance for 10 minutes, including:  Hip hinge series  Box squats 2 x 20    NP  Short foot to mini squat 2 x 10  Short foot to step up 6" 3 x 10 to 12       Patient Education and Home Exercises       Education provided:   - HEP  - Plan of Care   - Relation of Proximal and Distal Joints to the Knee   - Anatomy and Physiology of the Knee Joint   - Patellar Tracking and Related Tissues     Written Home Exercises Provided: yes. Exercises were reviewed and Dasia was able to demonstrate them prior to the end of the session.  Dasia demonstrated good  understanding of the education provided. See EMR under Patient Instructions for exercises provided during therapy sessions    Assessment   Improved mobility and decreased irritability this session compared to the previous week. Taking steps in the right direction to get over this acute knee irritation. Able to resume the majority of her exercises. Still issues with knee extension. Will gradually progress into knee extension.      Dasia Is progressing well towards her goals.   Pt prognosis is Good.     Pt will continue to benefit from skilled outpatient physical therapy to address the deficits listed in the problem list box on initial evaluation, provide pt/family education and to maximize pt's level of independence in the home and community environment.     Pt's spiritual, cultural and educational needs considered and " pt agreeable to plan of care and goals.     Anticipated barriers to physical therapy: None at this time     Goals:   Short Term Goals (4 Weeks): (met)  1. Pt will be compliant with HEP to supplement PT in restoring pain free function.  2. Pt will squat form to improve posterior chain loading and glut activation and decrease anterior knee loading.   3. Pt will improve impaired LE MMTs by 1/2 grade  to improve strength for functional tasks  4. Pt improve impaired knee extension ROM by 5 deg to improve mobility for normal movement patterns.   Long Term Goals (8 Weeks): (progressing, not met)  1. Pt will improve FOTO score to </= TBD% limited to decrease perceived limitation with mobility  2. Pt will demonstrate improved single leg loading without pain limitation to facilitate return to exercise classes.   3. Pt will improve impaired LE MMTs by 1 grade to improve strength for functional tasks.  4. Pt improve impaired knee ROM to WNL in all planes to improve mobility for normal movement patterns.   5. Pt will report pain of </=1/10 with ADLs to improve QOL and tolerance to activity.   Plan      Plan of care Certification: 3/18/2024 to 10/1/2024.     Outpatient Physical Therapy 3 times weekly for 12 weeks to include the following interventions: Cervical/Lumbar Traction, Electrical Stimulation , Gait Training, Manual Therapy, Moist Heat/ Ice, Neuromuscular Re-ed, Patient Education, Self Care, Therapeutic Activities, Therapeutic Exercise, and Ultrasound.     Tim Lynn, PT, DPT

## 2024-10-04 ENCOUNTER — CLINICAL SUPPORT (OUTPATIENT)
Dept: REHABILITATION | Facility: HOSPITAL | Age: 69
End: 2024-10-04
Payer: COMMERCIAL

## 2024-10-04 DIAGNOSIS — R29.898 WEAKNESS OF BOTH HIPS: Primary | ICD-10-CM

## 2024-10-04 DIAGNOSIS — M25.661 DECREASED RANGE OF MOTION OF BOTH KNEES: ICD-10-CM

## 2024-10-04 DIAGNOSIS — M25.662 DECREASED RANGE OF MOTION OF BOTH KNEES: ICD-10-CM

## 2024-10-04 PROCEDURE — 97750 PHYSICAL PERFORMANCE TEST: CPT | Performed by: PHYSICAL THERAPIST

## 2024-10-04 PROCEDURE — 97112 NEUROMUSCULAR REEDUCATION: CPT | Performed by: PHYSICAL THERAPIST

## 2024-10-04 PROCEDURE — 97110 THERAPEUTIC EXERCISES: CPT | Performed by: PHYSICAL THERAPIST

## 2024-10-04 NOTE — PLAN OF CARE
[facility]   Physical Therapy Treatment Note      Name: Dasia Henning  [facility] Number: [id number]    Therapy Diagnosis:   Encounter Diagnoses   Name Primary?    Weakness of both hips Yes    Decreased range of motion of both knees      Physician: Williams, Claude S. IV,*    Visit Date: 10/4/2024  Physician Orders: PT Eval and Treat   Medical Diagnosis from Referral:   M25.562 (ICD-10-CM) - Left knee pain   Evaluation Date: 3/18/2024  Authorization Period Expiration: 12/31/24  Plan of Care Expiration: 12/31/2024  Progress Note Due: 12/31/2024  Visit # / Visits authorized: 27/30  FOTO: COMPLETE  Precautions: Standard      Surgery: Knee Arthroscopy Performed by Claude Williams 10/27/23  Days Post-Op: 23 Weeks and 3 Days (4/8/24)**    PTA Visit #: 0/5     Time In: 0857  Time Out: 1000  Total Billable Time: 43 minutes      Subjective     Pt reports: Has been feeling much better. Able to improve her knee extension and exercise at home. Feels like she is moving past the fall.     She was compliant with home exercise program.  Response to previous treatment: Initial aching   Functional change: No change     Pain: 4/10  Location:  Left Lateral Knee Around Patella and Popliteal Space      Objective      Objective Measures updated at progress report unless specified.     Assessment of foot and ankle strength:     Observation: Pes planovalgus on RLE      Lower Extremity Strength  Right LE  Left LE                Hip flexion (supine): 4/5 Hip flexion (supine): 4/5   Hip extension:  4-/5 Hip extension: 4/5   PGM: 4/5 PGM:  3+/5   Hip ER:  4/5 Hip ER:  4-/5   Hip IR: 4+/5 Hip IR: 4+/5       Active Range of Motion:   Ankle Right Left   DF (knee extended) 5 10   DF (knee bent) 5 10   Plantarflexion 50 50   Inversion 30 30   Eversion 5 10      Strength:  Ankle Right Left   Dorsiflexion 4/5 5/5   Plantarflexion, knee extended 4/5 5/5   Soleus 4/5 4/5   Posterior Tibialis 4/5 5/5   Fibularis longus and brevis  "5/5 5/5     Foot Right Left   FDL 5/5 5/5   FDB 4/5 4/5   FHL 4/5 5/5   FDB 4/5 4/5   Lumbricals 3+/5 3-/5        Quad Microfet assessment 10/4/2024:  L - 43 Kgs  R - 33 Kgs    Treatment     Dasia received the treatments listed below:      Patient participated in microfet force testing as above for 10 minutes:    therapeutic exercises to develop strength, endurance, ROM, posture, and core stabilization for 08 minutes including:  Stationary cycle 8 minutes     manual therapy techniques: The techniques below were applied for 00 minutes:       neuromuscular re-education activities to improve:  for 25 minutes. The following activities were included:  HS bridge 20 x 5"  SL clamshell 10 x 10" GTB  Short foot mini squat 20x  Short foot step up 6" 2 x 10 ea      NP  Standing short foot 10" x 5  Posterior pelvic tilt 10 x 10"  Articulating bridge 3 x 10  Clamshell GTB 10 x 10"  SL Heel raise 10 x 10"    therapeutic activities to improve functional performance for 00 minutes, including:  Hip hinge series  Box squats 2 x 20    NP  Short foot to mini squat 2 x 10  Short foot to step up 6" 3 x 10 to 12       Patient Education and Home Exercises       Education provided:   - HEP  - Plan of Care   - Relation of Proximal and Distal Joints to the Knee   - Anatomy and Physiology of the Knee Joint   - Patellar Tracking and Related Tissues     Written Home Exercises Provided: yes. Exercises were reviewed and Dasia was able to demonstrate them prior to the end of the session.  Dasia demonstrated good  understanding of the education provided. See EMR under Patient Instructions for exercises provided during therapy sessions    Assessment   Patient has been progressing with PT despite injury. Of note her involved quadriceps is stronger than the contralateral side. She still has ankle and hip deficits which increase strain on the knee and ankle. Recommend continued PT for 4 weeks to address all goals.       Dasia Is progressing well towards " her goals.   Pt prognosis is Good.     Pt will continue to benefit from skilled outpatient physical therapy to address the deficits listed in the problem list box on initial evaluation, provide pt/family education and to maximize pt's level of independence in the home and community environment.     Pt's spiritual, cultural and educational needs considered and pt agreeable to plan of care and goals.     Anticipated barriers to physical therapy: None at this time     Goals:   Short Term Goals (4 Weeks): (met)  1. Pt will be compliant with HEP to supplement PT in restoring pain free function.  2. Pt will squat form to improve posterior chain loading and glut activation and decrease anterior knee loading.   3. Pt will improve impaired LE MMTs by 1/2 grade  to improve strength for functional tasks  4. Pt improve impaired knee extension ROM by 5 deg to improve mobility for normal movement patterns.   Long Term Goals (8 Weeks): (progressing, not met)  1. Pt will improve FOTO score to </= TBD% limited to decrease perceived limitation with mobility  2. Pt will demonstrate improved single leg loading without pain limitation to facilitate return to exercise classes. (Met)  3. Pt will improve impaired LE MMTs by 1 grade to improve strength for functional tasks. (Met)  4. Pt improve impaired knee ROM to WNL in all planes to improve mobility for normal movement patterns. (Progressing, not met)  5. Pt will report pain of </=1/10 with ADLs to improve QOL and tolerance to activity. (progressign, not met)  Plan      Plan of care Certification: 3/18/2024 to 12/31/2024.     Outpatient Physical Therapy 3 times weekly for 12 weeks to include the following interventions: Cervical/Lumbar Traction, Electrical Stimulation , Gait Training, Manual Therapy, Moist Heat/ Ice, Neuromuscular Re-ed, Patient Education, Self Care, Therapeutic Activities, Therapeutic Exercise, and Ultrasound.     Tim Lynn, PT,  DPT

## 2024-10-04 NOTE — PROGRESS NOTES
[facility]   Physical Therapy Treatment Note      Name: Dasia Henning  [facility] Number: [id number]    Therapy Diagnosis:   Encounter Diagnoses   Name Primary?    Weakness of both hips Yes    Decreased range of motion of both knees      Physician: Williams, Claude S. IV,*    Visit Date: 10/4/2024  Physician Orders: PT Eval and Treat   Medical Diagnosis from Referral:   M25.562 (ICD-10-CM) - Left knee pain   Evaluation Date: 3/18/2024  Authorization Period Expiration: 12/31/24  Plan of Care Expiration: 12/31/2024  Progress Note Due: 12/31/2024  Visit # / Visits authorized: 27/30  FOTO: COMPLETE  Precautions: Standard      Surgery: Knee Arthroscopy Performed by Claude Williams 10/27/23  Days Post-Op: 23 Weeks and 3 Days (4/8/24)**    PTA Visit #: 0/5     Time In: 0857  Time Out: 1000  Total Billable Time: 43 minutes      Subjective     Pt reports: Has been feeling much better. Able to improve her knee extension and exercise at home. Feels like she is moving past the fall.     She was compliant with home exercise program.  Response to previous treatment: Initial aching   Functional change: No change     Pain: 4/10  Location:  Left Lateral Knee Around Patella and Popliteal Space      Objective      Objective Measures updated at progress report unless specified.     Assessment of foot and ankle strength:     Observation: Pes planovalgus on RLE      Lower Extremity Strength  Right LE  Left LE                Hip flexion (supine): 4/5 Hip flexion (supine): 4/5   Hip extension:  4-/5 Hip extension: 4/5   PGM: 4/5 PGM:  3+/5   Hip ER:  4/5 Hip ER:  4-/5   Hip IR: 4+/5 Hip IR: 4+/5       Active Range of Motion:   Ankle Right Left   DF (knee extended) 5 10   DF (knee bent) 5 10   Plantarflexion 50 50   Inversion 30 30   Eversion 5 10      Strength:  Ankle Right Left   Dorsiflexion 4/5 5/5   Plantarflexion, knee extended 4/5 5/5   Soleus 4/5 4/5   Posterior Tibialis 4/5 5/5   Fibularis longus and brevis  "5/5 5/5     Foot Right Left   FDL 5/5 5/5   FDB 4/5 4/5   FHL 4/5 5/5   FDB 4/5 4/5   Lumbricals 3+/5 3-/5        Quad Microfet assessment 10/4/2024:  L - 43 Kgs  R - 33 Kgs    Treatment     Dasia received the treatments listed below:      Patient participated in microfet force testing as above for 10 minutes:    therapeutic exercises to develop strength, endurance, ROM, posture, and core stabilization for 08 minutes including:  Stationary cycle 8 minutes     manual therapy techniques: The techniques below were applied for 00 minutes:       neuromuscular re-education activities to improve:  for 25 minutes. The following activities were included:  HS bridge 20 x 5"  SL clamshell 10 x 10" GTB  Short foot mini squat 20x  Short foot step up 6" 2 x 10 ea      NP  Standing short foot 10" x 5  Posterior pelvic tilt 10 x 10"  Articulating bridge 3 x 10  Clamshell GTB 10 x 10"  SL Heel raise 10 x 10"    therapeutic activities to improve functional performance for 00 minutes, including:  Hip hinge series  Box squats 2 x 20    NP  Short foot to mini squat 2 x 10  Short foot to step up 6" 3 x 10 to 12       Patient Education and Home Exercises       Education provided:   - HEP  - Plan of Care   - Relation of Proximal and Distal Joints to the Knee   - Anatomy and Physiology of the Knee Joint   - Patellar Tracking and Related Tissues     Written Home Exercises Provided: yes. Exercises were reviewed and Dasia was able to demonstrate them prior to the end of the session.  Dasia demonstrated good  understanding of the education provided. See EMR under Patient Instructions for exercises provided during therapy sessions    Assessment   Patient has been progressing with PT despite injury. Of note her involved quadriceps is stronger than the contralateral side. She still has ankle and hip deficits which increase strain on the knee and ankle. Recommend continued PT for 4 weeks to address all goals.       Dasia Is progressing well towards " her goals.   Pt prognosis is Good.     Pt will continue to benefit from skilled outpatient physical therapy to address the deficits listed in the problem list box on initial evaluation, provide pt/family education and to maximize pt's level of independence in the home and community environment.     Pt's spiritual, cultural and educational needs considered and pt agreeable to plan of care and goals.     Anticipated barriers to physical therapy: None at this time     Goals:   Short Term Goals (4 Weeks): (met)  1. Pt will be compliant with HEP to supplement PT in restoring pain free function.  2. Pt will squat form to improve posterior chain loading and glut activation and decrease anterior knee loading.   3. Pt will improve impaired LE MMTs by 1/2 grade  to improve strength for functional tasks  4. Pt improve impaired knee extension ROM by 5 deg to improve mobility for normal movement patterns.   Long Term Goals (8 Weeks): (progressing, not met)  1. Pt will improve FOTO score to </= TBD% limited to decrease perceived limitation with mobility  2. Pt will demonstrate improved single leg loading without pain limitation to facilitate return to exercise classes. (Met)  3. Pt will improve impaired LE MMTs by 1 grade to improve strength for functional tasks. (Met)  4. Pt improve impaired knee ROM to WNL in all planes to improve mobility for normal movement patterns. (Progressing, not met)  5. Pt will report pain of </=1/10 with ADLs to improve QOL and tolerance to activity. (progressign, not met)  Plan      Plan of care Certification: 3/18/2024 to 12/31/2024.     Outpatient Physical Therapy 3 times weekly for 12 weeks to include the following interventions: Cervical/Lumbar Traction, Electrical Stimulation , Gait Training, Manual Therapy, Moist Heat/ Ice, Neuromuscular Re-ed, Patient Education, Self Care, Therapeutic Activities, Therapeutic Exercise, and Ultrasound.     Tim Lynn, PT,  DPT

## 2024-11-21 ENCOUNTER — CLINICAL SUPPORT (OUTPATIENT)
Dept: REHABILITATION | Facility: HOSPITAL | Age: 69
End: 2024-11-21
Payer: COMMERCIAL

## 2024-11-21 DIAGNOSIS — R29.898 WEAKNESS OF BOTH HIPS: Primary | ICD-10-CM

## 2024-11-21 DIAGNOSIS — M25.662 DECREASED RANGE OF MOTION OF BOTH KNEES: ICD-10-CM

## 2024-11-21 DIAGNOSIS — M25.661 DECREASED RANGE OF MOTION OF BOTH KNEES: ICD-10-CM

## 2024-11-21 PROCEDURE — 97140 MANUAL THERAPY 1/> REGIONS: CPT | Performed by: PHYSICAL THERAPIST

## 2024-11-21 PROCEDURE — 97112 NEUROMUSCULAR REEDUCATION: CPT | Performed by: PHYSICAL THERAPIST

## 2024-11-25 NOTE — PROGRESS NOTES
[facility]   Physical Therapy Treatment Note      Name: Dasia Henning  Clinic Number: [id number]    Therapy Diagnosis:   Encounter Diagnoses   Name Primary?    Weakness of both hips Yes    Decreased range of motion of both knees      Physician: Williams, Claude S. IV,*    Visit Date: 11/21/2024  Physician Orders: PT Eval and Treat   Medical Diagnosis from Referral:   M25.562 (ICD-10-CM) - Left knee pain   Evaluation Date: 3/18/2024  Authorization Period Expiration: 12/31/24  Plan of Care Expiration: 12/31/2024  Progress Note Due: 12/31/2024  Visit # / Visits authorized: 29/30  FOTO: COMPLETE  Precautions: Standard      Surgery: Knee Arthroscopy Performed by Claude Williams 10/27/23  Days Post-Op: 23 Weeks and 3 Days (4/8/24)**    PTA Visit #: 0/5     Time In: 1600  Time Out: 1700  Total Billable Time: 43 minutes      Subjective     Pt reports: Was unable to come for a few weeks as she cancelled an appointment and then did not have one rescheduled. Overall has been doing well but had an issue where she was at a gala and twisted her knee. Had some swelling and pain but she was able to self manage with her home exercise plan. Feels like she is getting weaker.     She was compliant with home exercise program.  Response to previous treatment: Initial aching   Functional change: No change     Pain: 4/10  Location:  Left Lateral Knee Around Patella and Popliteal Space      Objective      Objective Measures updated at progress report unless specified.     Assessment of foot and ankle strength:     Observation: Pes planovalgus on RLE      Lower Extremity Strength  Right LE  Left LE                Hip flexion (supine): 4/5 Hip flexion (supine): 4/5   Hip extension:  4-/5 Hip extension: 4/5   PGM: 4/5 PGM:  3+/5   Hip ER:  4/5 Hip ER:  4-/5   Hip IR: 4+/5 Hip IR: 4+/5       Active Range of Motion:   Ankle Right Left   DF (knee extended) 5 10   DF (knee bent) 5 10   Plantarflexion 50 50   Inversion 30  "30   Eversion 5 10      Strength:  Ankle Right Left   Dorsiflexion 4/5 5/5   Plantarflexion, knee extended 4/5 5/5   Soleus 4/5 4/5   Posterior Tibialis 4/5 5/5   Fibularis longus and brevis 5/5 5/5     Foot Right Left   FDL 5/5 5/5   FDB 4/5 4/5   FHL 4/5 5/5   FDB 4/5 4/5   Lumbricals 3+/5 3-/5        Quad Microfet assessment 10/4/2024:  L - 43 Kgs  R - 33 Kgs    Treatment     Dasia received the treatments listed below:      Patient participated in microfet force testing as above for 10 minutes:    therapeutic exercises to develop strength, endurance, ROM, posture, and core stabilization for 08 minutes including:  Stationary cycle 8 minutes - NP  HS prop 20x  Heel slide 20x    manual therapy techniques: The techniques below were applied for 00 minutes:       neuromuscular re-education activities to improve:  for 40 minutes. The following activities were included:  Assessment of ROM and joint mobility  Bridge 10 x 10"  SL clamshell 10 x 10" GTB  Quad set on map 20x  Quad set from chair 20x  Quad iso into ball on table 30" x 5    NP  Standing short foot 10" x 5  Posterior pelvic tilt 10 x 10"  Articulating bridge 3 x 10  Clamshell GTB 10 x 10"  SL Heel raise 10 x 10"    therapeutic activities to improve functional performance for 00 minutes, including:    NP  Short foot to mini squat 2 x 10  Short foot to step up 6" 3 x 10 to 12   Hip hinge series  Box squats 2 x 20    Patient Education and Home Exercises       Education provided:   - HEP  - Plan of Care   - Relation of Proximal and Distal Joints to the Knee   - Anatomy and Physiology of the Knee Joint   - Patellar Tracking and Related Tissues     Written Home Exercises Provided: yes. Exercises were reviewed and Dasia was able to demonstrate them prior to the end of the session.  Dasia demonstrated good  understanding of the education provided. See EMR under Patient Instructions for exercises provided during therapy sessions    Assessment   Returns to treatment " following a multi week absence. Looks fairly similar. Actually some improved extension though knee flexion was limited. Able to resume quad based strengthening program. Will focus on closed chain strengthening.       Dasia Is progressing well towards her goals.   Pt prognosis is Good.     Pt will continue to benefit from skilled outpatient physical therapy to address the deficits listed in the problem list box on initial evaluation, provide pt/family education and to maximize pt's level of independence in the home and community environment.     Pt's spiritual, cultural and educational needs considered and pt agreeable to plan of care and goals.     Anticipated barriers to physical therapy: None at this time     Goals:   Short Term Goals (4 Weeks): (met)  1. Pt will be compliant with HEP to supplement PT in restoring pain free function.  2. Pt will squat form to improve posterior chain loading and glut activation and decrease anterior knee loading.   3. Pt will improve impaired LE MMTs by 1/2 grade  to improve strength for functional tasks  4. Pt improve impaired knee extension ROM by 5 deg to improve mobility for normal movement patterns.   Long Term Goals (8 Weeks): (progressing, not met)  1. Pt will improve FOTO score to </= TBD% limited to decrease perceived limitation with mobility  2. Pt will demonstrate improved single leg loading without pain limitation to facilitate return to exercise classes. (Met)  3. Pt will improve impaired LE MMTs by 1 grade to improve strength for functional tasks. (Met)  4. Pt improve impaired knee ROM to WNL in all planes to improve mobility for normal movement patterns. (Progressing, not met)  5. Pt will report pain of </=1/10 with ADLs to improve QOL and tolerance to activity. (progressign, not met)  Plan      Plan of care Certification: 3/18/2024 to 12/31/2024.     Outpatient Physical Therapy 3 times weekly for 12 weeks to include the following interventions: Cervical/Lumbar  Traction, Electrical Stimulation , Gait Training, Manual Therapy, Moist Heat/ Ice, Neuromuscular Re-ed, Patient Education, Self Care, Therapeutic Activities, Therapeutic Exercise, and Ultrasound.     Tim Lynn, PT, DPT

## 2025-01-09 ENCOUNTER — LAB VISIT (OUTPATIENT)
Dept: LAB | Facility: OTHER | Age: 70
End: 2025-01-09
Attending: INTERNAL MEDICINE
Payer: COMMERCIAL

## 2025-01-09 DIAGNOSIS — R73.9 ELEVATED BLOOD SUGAR: ICD-10-CM

## 2025-01-09 DIAGNOSIS — E78.5 HYPERLIPIDEMIA, UNSPECIFIED HYPERLIPIDEMIA TYPE: ICD-10-CM

## 2025-01-09 DIAGNOSIS — E55.9 VITAMIN D DEFICIENCY, UNSPECIFIED: ICD-10-CM

## 2025-01-09 DIAGNOSIS — R79.89 LOW VITAMIN D LEVEL: ICD-10-CM

## 2025-01-09 LAB
25(OH)D3+25(OH)D2 SERPL-MCNC: 34 NG/ML (ref 30–96)
ALBUMIN SERPL BCP-MCNC: 4.1 G/DL (ref 3.5–5.2)
ALP SERPL-CCNC: 96 U/L (ref 40–150)
ALT SERPL W/O P-5'-P-CCNC: 25 U/L (ref 10–44)
ANION GAP SERPL CALC-SCNC: 9 MMOL/L (ref 8–16)
AST SERPL-CCNC: 20 U/L (ref 10–40)
BILIRUB SERPL-MCNC: 0.4 MG/DL (ref 0.1–1)
BUN SERPL-MCNC: 24 MG/DL (ref 8–23)
CALCIUM SERPL-MCNC: 10.1 MG/DL (ref 8.7–10.5)
CHLORIDE SERPL-SCNC: 109 MMOL/L (ref 95–110)
CO2 SERPL-SCNC: 21 MMOL/L (ref 23–29)
CREAT SERPL-MCNC: 1 MG/DL (ref 0.5–1.4)
EST. GFR  (NO RACE VARIABLE): >60 ML/MIN/1.73 M^2
ESTIMATED AVG GLUCOSE: 103 MG/DL (ref 68–131)
GLUCOSE SERPL-MCNC: 100 MG/DL (ref 70–110)
HBA1C MFR BLD: 5.2 % (ref 4–5.6)
POTASSIUM SERPL-SCNC: 4.2 MMOL/L (ref 3.5–5.1)
PROT SERPL-MCNC: 7.2 G/DL (ref 6–8.4)
SODIUM SERPL-SCNC: 139 MMOL/L (ref 136–145)

## 2025-01-09 PROCEDURE — 83036 HEMOGLOBIN GLYCOSYLATED A1C: CPT | Performed by: INTERNAL MEDICINE

## 2025-01-09 PROCEDURE — 82306 VITAMIN D 25 HYDROXY: CPT | Performed by: INTERNAL MEDICINE

## 2025-01-09 PROCEDURE — 80053 COMPREHEN METABOLIC PANEL: CPT | Performed by: INTERNAL MEDICINE

## 2025-01-09 PROCEDURE — 36415 COLL VENOUS BLD VENIPUNCTURE: CPT | Performed by: INTERNAL MEDICINE

## 2025-07-29 ENCOUNTER — TELEPHONE (OUTPATIENT)
Dept: SLEEP MEDICINE | Facility: CLINIC | Age: 70
End: 2025-07-29
Payer: COMMERCIAL

## 2025-07-29 NOTE — TELEPHONE ENCOUNTER
No sooner available    ----- Message from Niyah sent at 7/29/2025 11:06 AM CDT -----  Regarding: Sooner Appointment  Contact: Patient  Good Morning,        ABDIRAHMAN Kramer, office call to schedule patient and they was hoping patient could be seen sooner then October for her sleep issues if possible. Please call patient at the phone number on file if a sooner appointment is available.        Thank You,  Niyah Aleman [facility]

## (undated) DEVICE — GLOVE BIOGEL SKINSENSE PI 7.5

## (undated) DEVICE — BANDAGE ESMARK ELASTIC ST 6X9

## (undated) DEVICE — Device

## (undated) DEVICE — UNDERGLOVES BIOGEL PI SIZE 8

## (undated) DEVICE — APPLICATOR CHLORAPREP ORN 26ML

## (undated) DEVICE — DRESSING XEROFORM NONADH 1X8IN

## (undated) DEVICE — ALCOHOL ISOPROPYL BLU 70% 16OZ

## (undated) DEVICE — SOL NACL IRR 3000ML

## (undated) DEVICE — SUT 4.0 ETHILON

## (undated) DEVICE — SET Y-TYPE TUR IRRIGATION 96IN

## (undated) DEVICE — BLADE GATOR 4.2

## (undated) DEVICE — PAD COLD THERAPY KNEE WRAP ON

## (undated) DEVICE — TOURNIQUET SB QC DP 34X4IN

## (undated) DEVICE — BANDAGE MATRIX HK LOOP 6IN 5YD

## (undated) DEVICE — DRAPE ARTHSCP FLD CTRL POUCH

## (undated) DEVICE — DRESSING XEROFORM 1X8IN

## (undated) DEVICE — SOL IRR SOD CHL .9% POUR

## (undated) DEVICE — SPONGE COTTON TRAY 4X4IN